# Patient Record
Sex: FEMALE | Race: WHITE | HISPANIC OR LATINO | Employment: UNEMPLOYED | URBAN - METROPOLITAN AREA
[De-identification: names, ages, dates, MRNs, and addresses within clinical notes are randomized per-mention and may not be internally consistent; named-entity substitution may affect disease eponyms.]

---

## 2020-09-22 ENCOUNTER — HOSPITAL ENCOUNTER (EMERGENCY)
Facility: HOSPITAL | Age: 25
Discharge: HOME | End: 2020-09-23
Attending: EMERGENCY MEDICINE | Admitting: EMERGENCY MEDICINE
Payer: COMMERCIAL

## 2020-09-22 VITALS
TEMPERATURE: 98 F | OXYGEN SATURATION: 97 % | RESPIRATION RATE: 18 BRPM | HEART RATE: 88 BPM | DIASTOLIC BLOOD PRESSURE: 61 MMHG | SYSTOLIC BLOOD PRESSURE: 104 MMHG

## 2020-09-22 DIAGNOSIS — K08.89 PAIN, DENTAL: Primary | ICD-10-CM

## 2020-09-22 DIAGNOSIS — K02.9 DENTAL CARIES: ICD-10-CM

## 2020-09-22 PROCEDURE — 99284 EMERGENCY DEPT VISIT MOD MDM: CPT | Performed by: EMERGENCY MEDICINE

## 2020-09-22 PROCEDURE — 99283 EMERGENCY DEPT VISIT LOW MDM: CPT

## 2020-09-22 PROCEDURE — 64400 NJX AA&/STRD TRIGEMINAL NRV: CPT | Performed by: EMERGENCY MEDICINE

## 2020-09-23 RX ORDER — BUPIVACAINE HYDROCHLORIDE 5 MG/ML
5 INJECTION, SOLUTION EPIDURAL; INTRACAUDAL ONCE
Status: COMPLETED | OUTPATIENT
Start: 2020-09-23 | End: 2020-09-23

## 2020-09-23 RX ORDER — LIDOCAINE HYDROCHLORIDE AND EPINEPHRINE 10; 10 MG/ML; UG/ML
5 INJECTION, SOLUTION INFILTRATION; PERINEURAL ONCE
Status: DISCONTINUED | OUTPATIENT
Start: 2020-09-23 | End: 2020-09-23 | Stop reason: HOSPADM

## 2020-09-23 RX ORDER — IBUPROFEN 600 MG/1
600 TABLET ORAL ONCE
Status: COMPLETED | OUTPATIENT
Start: 2020-09-23 | End: 2020-09-23

## 2020-09-23 RX ORDER — ACETAMINOPHEN 325 MG/1
650 TABLET ORAL ONCE
Status: COMPLETED | OUTPATIENT
Start: 2020-09-23 | End: 2020-09-23

## 2020-09-23 RX ADMIN — ACETAMINOPHEN 650 MG: 325 TABLET, FILM COATED ORAL at 00:29

## 2020-09-23 RX ADMIN — IBUPROFEN 600 MG: 600 TABLET, FILM COATED ORAL at 00:29

## 2020-09-23 RX ADMIN — BUPIVACAINE HYDROCHLORIDE 5 ML: 5 INJECTION, SOLUTION EPIDURAL; INTRACAUDAL at 00:33

## 2020-09-23 NOTE — ED PROVIDER NOTES
History  Chief Complaint   Patient presents with    Dental Pain     Pt reports onging dental pain in entire mouth; denies fevers, denies specific trigger       Dental Pain   Location:  Upper  Upper teeth location:  14/BAL 1st molar and 15/BAL 2nd molar  Quality:  Aching  Severity:  Moderate  Onset quality:  Gradual  Duration:  1 day  Timing:  Constant  Progression:  Waxing and waning  Chronicity:  New  Context: dental caries    Previous work-up:  Dental exam  Associated symptoms: no facial swelling, no fever and no neck pain        None       History reviewed  No pertinent past medical history  History reviewed  No pertinent surgical history  History reviewed  No pertinent family history  I have reviewed and agree with the history as documented  E-Cigarette/Vaping    E-Cigarette Use Never User      E-Cigarette/Vaping Substances     Social History     Tobacco Use    Smoking status: Never Smoker    Smokeless tobacco: Never Used   Substance Use Topics    Alcohol use: Not Currently    Drug use: Not Currently        Review of Systems   Constitutional: Negative for chills and fever  HENT: Positive for dental problem  Negative for facial swelling, sore throat and trouble swallowing  Eyes: Negative for photophobia and visual disturbance  Respiratory: Negative for cough, shortness of breath and stridor  Cardiovascular: Negative for chest pain and palpitations  Gastrointestinal: Negative for abdominal pain, blood in stool, diarrhea, nausea and vomiting  Genitourinary: Negative for dysuria and hematuria  Musculoskeletal: Negative for neck pain and neck stiffness  Skin: Negative for rash and wound  Neurological: Negative for weakness and numbness  Psychiatric/Behavioral: Negative for agitation and confusion  All other systems reviewed and are negative        Physical Exam  ED Triage Vitals   Temperature Pulse Respirations Blood Pressure SpO2   09/22/20 2355 09/22/20 2355 09/22/20 2355 09/22/20 2355 09/22/20 2355   98 °F (36 7 °C) 88 18 104/61 97 %      Temp Source Heart Rate Source Patient Position - Orthostatic VS BP Location FiO2 (%)   09/22/20 2355 09/22/20 2355 09/22/20 2355 09/22/20 2355 --   Oral Monitor Sitting Left arm       Pain Score       09/23/20 0029       7             Orthostatic Vital Signs  Vitals:    09/22/20 2355   BP: 104/61   Pulse: 88   Patient Position - Orthostatic VS: Sitting       Physical Exam  Vitals signs and nursing note reviewed  Constitutional:       General: She is not in acute distress  Appearance: She is well-developed  She is not diaphoretic  HENT:      Head: Normocephalic  Right Ear: External ear normal       Left Ear: External ear normal       Nose: Nose normal       Mouth/Throat:      Mouth: No injury, lacerations or oral lesions  Dentition: Dental caries present  No gingival swelling, dental abscesses or gum lesions  Tongue: No lesions  Palate: No mass  Pharynx: Oropharynx is clear  Uvula midline  No pharyngeal swelling, posterior oropharyngeal erythema or uvula swelling  Tonsils: No tonsillar exudate  Eyes:      Conjunctiva/sclera: Conjunctivae normal    Neck:      Trachea: No tracheal deviation  Cardiovascular:      Rate and Rhythm: Normal rate  Heart sounds: Normal heart sounds  Pulmonary:      Effort: Pulmonary effort is normal  No respiratory distress  Breath sounds: Normal breath sounds  No stridor  No wheezing or rales  Abdominal:      General: There is no distension  Palpations: Abdomen is soft  Tenderness: There is no abdominal tenderness  There is no guarding or rebound  Musculoskeletal:         General: No tenderness or deformity  Skin:     General: Skin is warm and dry  Capillary Refill: Capillary refill takes less than 2 seconds  Neurological:      Mental Status: She is alert  Cranial Nerves: No cranial nerve deficit  Sensory: No sensory deficit        Motor: No abnormal muscle tone  Psychiatric:         Behavior: Behavior normal          ED Medications  Medications   acetaminophen (TYLENOL) tablet 650 mg (650 mg Oral Given 9/23/20 0029)   ibuprofen (MOTRIN) tablet 600 mg (600 mg Oral Given 9/23/20 0029)   bupivacaine (PF) (MARCAINE) 0 5 % injection 5 mL (5 mL Infiltration Given 9/23/20 0033)       Diagnostic Studies  Results Reviewed     None                 No orders to display         Procedures  Nerve block    Date/Time: 9/23/2020 4:35 PM  Performed by: Jade Carvajal MD  Authorized by: Jade Carvajal MD     Consent:     Consent obtained:  Verbal    Consent given by:  Patient    Risks discussed: Allergic reaction, bleeding, intravenous injection, infection, nerve damage, pain, unsuccessful block and swelling    Alternatives discussed:  No treatment, delayed treatment and alternative treatment  Universal protocol:     Procedure explained and questions answered to patient or proxy's satisfaction: yes      Relevant documents present and verified: yes      Required blood products, implants, devices, and special equipment available: yes      Site marked: yes      Time out called: yes      Patient identity confirmed:  Verbally with patient, arm band, provided demographic data and hospital-assigned identification number  Indications:     Indications:  Pain relief  Location:     Body area:  Head    Head nerve blocked: Superior alveolar dental block  Laterality:  Left  Pre-procedure details:     Preparation: Patient was prepped and draped in usual sterile fashion    Procedure details (see MAR for exact dosages): Block needle gauge:  22 G    Anesthetic injected:  Bupivacaine 0 5% w/o epi  Post-procedure details:     Dressing:  None    Outcome:  Anesthesia achieved    Patient tolerance of procedure:   Tolerated well, no immediate complications          ED Course  ED Course as of Sep 23 1637   Wed Sep 23, 2020   0050 L upper dental block posterior molars SBIRT 20yo+      Most Recent Value   SBIRT (24 yo +)   In order to provide better care to our patients, we are screening all of our patients for alcohol and drug use  Would it be okay to ask you these screening questions? Yes Filed at: 09/23/2020 0002   Initial Alcohol Screen: US AUDIT-C    1  How often do you have a drink containing alcohol?  0 Filed at: 09/23/2020 0002   2  How many drinks containing alcohol do you have on a typical day you are drinking? 0 Filed at: 09/23/2020 0002   3a  Male UNDER 65: How often do you have five or more drinks on one occasion? 0 Filed at: 09/23/2020 0002   3b  FEMALE Any Age, or MALE 65+: How often do you have 4 or more drinks on one occassion? 0 Filed at: 09/23/2020 0002   Audit-C Score  0 Filed at: 09/23/2020 0002   ALF: How many times in the past year have you    Used an illegal drug or used a prescription medication for non-medical reasons? Never Filed at: 09/23/2020 0002                  MDM  Number of Diagnoses or Management Options  Dental caries:   Pain, dental:   Diagnosis management comments: This is a 66-year-old female who presents with dental pain x1 day, states that she is experiencing pain in her left upper molars, denies fevers or chills or systemic symptoms, no swelling of her face no difficulty swallowing  She was previously seen by the dentist and had dental extraction on the right upper side  On exam patient is noted to have multiple dental caries throughout her mouth, she does not have any appreciable gingival swelling, no evidence of abscess, she has a pain airway with no oral facial edema patient is handling secretions  Overall this appears to be dental pain secondary to dental caries, no evidence of soft tissue infection or systemic infection  Will treat symptomatically with dental block as well as Tylenol Motrin    Patient given information for follow-up with the [facility] walk-in dental clinic and counseled to follow up in the morning with them  Counseled patient on strict return precautions to the emergency department  Disposition  Final diagnoses:   Pain, dental   Dental caries     Time reflects when diagnosis was documented in both MDM as applicable and the Disposition within this note     Time User Action Codes Description Comment    9/23/2020 12:42 AM Clifton Escudero Add [K08 89] Pain, dental     9/23/2020 12:42 AM Clifton Escudero Add [K02 9] Dental caries       ED Disposition     ED Disposition Condition Date/Time Comment    Discharge Stable Wed Sep 23, 2020 12:42 AM Devan Carty discharge to home/self care  Follow-up Information     Follow up With Specialties Details Why Contact Info Additional Information    [facility]'s dental clinic  Call  Please call and follow-up in the morning at the walk-in dental clinic using the written information your provided      [facility] Emergency Department Emergency Medicine Go to  If he have swelling of the face, fevers or chills, difficulty swallowing or difficulty breathing [address] [facility] ED, [address], [address]   [phone]          There are no discharge medications for this patient  No discharge procedures on file  PDMP Review     None           ED Provider  Attending physically available and evaluated Devan Carty I managed the patient along with the ED Attending      Electronically Signed by         Niranjan Winston MD  09/23/20 6420

## 2020-09-23 NOTE — ED ATTENDING ATTESTATION
9/22/2020  ISerjio MD, saw and evaluated the patient  I have discussed the patient with the resident/non-physician practitioner and agree with the resident's/non-physician practitioner's findings, Plan of Care, and MDM as documented in the resident's/non-physician practitioner's note, except where noted  All available labs and Radiology studies were reviewed  I was present for key portions of any procedure(s) performed by the resident/non-physician practitioner and I was immediately available to provide assistance  At this point I agree with the current assessment done in the Emergency Department  I have conducted an independent evaluation of this patient a history and physical is as follows:    ED Course     Emergency Department Note- Fredi Peters 30 y o  female MRN: [id number]    Unit/Bed#: ED 07 Encounter: [id number]    Fredi Peters is a 30 y o  female who presents with   Chief Complaint   Patient presents with    Dental Pain     Pt reports onging dental pain in entire mouth; denies fevers, denies specific trigger         History of Present Illness   HPI:  Fredi Peters is a 30 y o  female who presents for evaluation of:  Amrita Richardson  The patient has been having ongoing dental pain over the last month  The patient has recently moved from [place] and has lost access to her dentist in [place]  The patient denies any dental trauma  Eating and cold fluids exacerbate her discomfort  Patient denies any associated fevers  Review of Systems   Constitutional: Negative for chills and fever  HENT: Positive for dental problem  Negative for congestion and rhinorrhea  Respiratory: Negative for cough and shortness of breath  All other systems reviewed and are negative  Historical Information   History reviewed  No pertinent past medical history  History reviewed  No pertinent surgical history    Social History   Social History     Substance and Sexual Activity   Alcohol Use Not Currently     Social History     Substance and Sexual Activity   Drug Use Not Currently     Social History     Tobacco Use   Smoking Status Never Smoker   Smokeless Tobacco Never Used     Family History: non-contributory    Meds/Allergies   all medications and allergies reviewed  No Known Allergies    Objective   First Vitals:   Blood Pressure: 104/61 (205)  Pulse: 88 (205)  Temperature: 98 °F (36 7 °C) (20)  Temp Source: Oral (20)  Respirations: 18 (20)  SpO2: 97 % (20)    Current Vitals:   Blood Pressure: 104/61 (205)  Pulse: 88 (20)  Temperature: 98 °F (36 7 °C) (20)  Temp Source: Oral (20)  Respirations: 18 (20)  SpO2: 97 % (20)    No intake or output data in the 24 hours ending 20 0058    Invasive Devices     None                 Physical Exam  Vitals signs and nursing note reviewed  Constitutional:       Appearance: Normal appearance  HENT:      Head: Normocephalic and atraumatic  Nose: No rhinorrhea  Cardiovascular:      Rate and Rhythm: Normal rate and regular rhythm  Pulmonary:      Effort: Pulmonary effort is normal       Breath sounds: Normal breath sounds  Abdominal:      General: Bowel sounds are normal       Palpations: Abdomen is soft  Musculoskeletal: Normal range of motion  Skin:     General: Skin is warm and dry  Neurological:      General: No focal deficit present  Mental Status: She is alert and oriented to person, place, and time  Psychiatric:         Mood and Affect: Mood normal          Behavior: Behavior normal          Thought Content: Thought content normal          Judgment: Judgment normal            Medical Decision Makin  Acute dentalgia secondary to cavities:  Dental block by resident  No results found for this or any previous visit (from the past 36 hour(s))    No orders to display         Portions of the record may have been created with voice recognition software  Occasional wrong word or "sound a like" substitutions may have occurred due to the inherent limitations of voice recognition software  Read the chart carefully and recognize, using context, where substitutions have occurred          Critical Care Time  Procedures

## 2020-09-25 ENCOUNTER — HOSPITAL ENCOUNTER (EMERGENCY)
Facility: HOSPITAL | Age: 25
Discharge: HOME | End: 2020-09-26
Attending: EMERGENCY MEDICINE | Admitting: EMERGENCY MEDICINE

## 2020-09-25 VITALS
SYSTOLIC BLOOD PRESSURE: 109 MMHG | BODY MASS INDEX: 24.16 KG/M2 | HEIGHT: 65 IN | HEART RATE: 98 BPM | DIASTOLIC BLOOD PRESSURE: 62 MMHG | WEIGHT: 145 LBS | OXYGEN SATURATION: 99 % | RESPIRATION RATE: 18 BRPM | TEMPERATURE: 99.3 F

## 2020-09-25 DIAGNOSIS — R06.00 DYSPNEA: ICD-10-CM

## 2020-09-25 DIAGNOSIS — B37.3 VULVOVAGINAL CANDIDIASIS: ICD-10-CM

## 2020-09-25 DIAGNOSIS — Z34.90 PREGNANCY: Primary | ICD-10-CM

## 2020-09-25 LAB
EXT PREG TEST URINE: NORMAL
EXT. CONTROL ED NAV: NORMAL

## 2020-09-25 PROCEDURE — 99282 EMERGENCY DEPT VISIT SF MDM: CPT

## 2020-09-25 PROCEDURE — 84702 CHORIONIC GONADOTROPIN TEST: CPT | Performed by: EMERGENCY MEDICINE

## 2020-09-25 PROCEDURE — 99284 EMERGENCY DEPT VISIT MOD MDM: CPT | Performed by: EMERGENCY MEDICINE

## 2020-09-25 PROCEDURE — 36415 COLL VENOUS BLD VENIPUNCTURE: CPT | Performed by: EMERGENCY MEDICINE

## 2020-09-25 PROCEDURE — 81025 URINE PREGNANCY TEST: CPT | Performed by: EMERGENCY MEDICINE

## 2020-09-26 LAB
B-HCG SERPL-ACNC: 24 MIU/ML
BACTERIA UR QL AUTO: ABNORMAL /HPF
BILIRUB UR QL STRIP: NEGATIVE
CLARITY UR: CLEAR
COLOR UR: YELLOW
COLOR, POC: YELLOW
GLUCOSE UR STRIP-MCNC: NEGATIVE MG/DL
HGB UR QL STRIP.AUTO: ABNORMAL
HYALINE CASTS #/AREA URNS LPF: ABNORMAL /LPF
KETONES UR STRIP-MCNC: NEGATIVE MG/DL
LEUKOCYTE ESTERASE UR QL STRIP: ABNORMAL
NITRITE UR QL STRIP: NEGATIVE
NON-SQ EPI CELLS URNS QL MICRO: ABNORMAL /HPF
PH UR STRIP.AUTO: 6 [PH] (ref 4.5–8)
PROT UR STRIP-MCNC: NEGATIVE MG/DL
RBC #/AREA URNS AUTO: ABNORMAL /HPF
SP GR UR STRIP.AUTO: >=1.03 (ref 1–1.03)
UROBILINOGEN UR QL STRIP.AUTO: 0.2 E.U./DL
WBC #/AREA URNS AUTO: ABNORMAL /HPF

## 2020-09-26 PROCEDURE — 81001 URINALYSIS AUTO W/SCOPE: CPT

## 2020-10-07 ENCOUNTER — HOSPITAL ENCOUNTER (EMERGENCY)
Facility: HOSPITAL | Age: 25
Discharge: HOME | End: 2020-10-07
Attending: EMERGENCY MEDICINE
Payer: COMMERCIAL

## 2020-10-07 ENCOUNTER — APPOINTMENT (EMERGENCY)
Dept: RADIOLOGY | Facility: HOSPITAL | Age: 25
End: 2020-10-07
Payer: COMMERCIAL

## 2020-10-07 VITALS
BODY MASS INDEX: 27.87 KG/M2 | TEMPERATURE: 98.4 F | HEIGHT: 62 IN | RESPIRATION RATE: 20 BRPM | HEART RATE: 65 BPM | SYSTOLIC BLOOD PRESSURE: 101 MMHG | DIASTOLIC BLOOD PRESSURE: 55 MMHG | WEIGHT: 151.46 LBS | OXYGEN SATURATION: 96 %

## 2020-10-07 DIAGNOSIS — O26.899 ABDOMINAL PAIN AFFECTING PREGNANCY: ICD-10-CM

## 2020-10-07 DIAGNOSIS — O20.0 THREATENED MISCARRIAGE IN EARLY PREGNANCY: Primary | ICD-10-CM

## 2020-10-07 DIAGNOSIS — N39.0 UTI (URINARY TRACT INFECTION): ICD-10-CM

## 2020-10-07 DIAGNOSIS — N93.9 VAGINAL BLEEDING: ICD-10-CM

## 2020-10-07 DIAGNOSIS — R10.9 ABDOMINAL PAIN AFFECTING PREGNANCY: ICD-10-CM

## 2020-10-07 LAB
ABO GROUP BLD: NORMAL
B-HCG SERPL-ACNC: 8549 MIU/ML
BACTERIA UR QL AUTO: ABNORMAL /HPF
BILIRUB UR QL STRIP: NEGATIVE
BLD GP AB SCN SERPL QL: NEGATIVE
CLARITY UR: CLEAR
COLOR UR: YELLOW
COLOR, POC: YELLOW
EXT PREG TEST URINE: POSITIVE
EXT. CONTROL ED NAV: ABNORMAL
GLUCOSE UR STRIP-MCNC: NEGATIVE MG/DL
HGB UR QL STRIP.AUTO: ABNORMAL
HYALINE CASTS #/AREA URNS LPF: ABNORMAL /LPF
KETONES UR STRIP-MCNC: NEGATIVE MG/DL
LEUKOCYTE ESTERASE UR QL STRIP: ABNORMAL
NITRITE UR QL STRIP: NEGATIVE
NON-SQ EPI CELLS URNS QL MICRO: ABNORMAL /HPF
PH UR STRIP.AUTO: 5 [PH] (ref 4.5–8)
PROT UR STRIP-MCNC: NEGATIVE MG/DL
RBC #/AREA URNS AUTO: ABNORMAL /HPF
RH BLD: POSITIVE
SP GR UR STRIP.AUTO: 1.02 (ref 1–1.03)
SPECIMEN EXPIRATION DATE: NORMAL
UROBILINOGEN UR QL STRIP.AUTO: 0.2 E.U./DL
WBC #/AREA URNS AUTO: ABNORMAL /HPF

## 2020-10-07 PROCEDURE — 87086 URINE CULTURE/COLONY COUNT: CPT

## 2020-10-07 PROCEDURE — 81001 URINALYSIS AUTO W/SCOPE: CPT

## 2020-10-07 PROCEDURE — 86850 RBC ANTIBODY SCREEN: CPT | Performed by: EMERGENCY MEDICINE

## 2020-10-07 PROCEDURE — 87077 CULTURE AEROBIC IDENTIFY: CPT

## 2020-10-07 PROCEDURE — 81025 URINE PREGNANCY TEST: CPT | Performed by: EMERGENCY MEDICINE

## 2020-10-07 PROCEDURE — 87186 SC STD MICRODIL/AGAR DIL: CPT

## 2020-10-07 PROCEDURE — 99284 EMERGENCY DEPT VISIT MOD MDM: CPT | Performed by: EMERGENCY MEDICINE

## 2020-10-07 PROCEDURE — 76815 OB US LIMITED FETUS(S): CPT

## 2020-10-07 PROCEDURE — 36415 COLL VENOUS BLD VENIPUNCTURE: CPT | Performed by: EMERGENCY MEDICINE

## 2020-10-07 PROCEDURE — 99284 EMERGENCY DEPT VISIT MOD MDM: CPT

## 2020-10-07 PROCEDURE — 84702 CHORIONIC GONADOTROPIN TEST: CPT | Performed by: EMERGENCY MEDICINE

## 2020-10-07 PROCEDURE — 86900 BLOOD TYPING SEROLOGIC ABO: CPT | Performed by: EMERGENCY MEDICINE

## 2020-10-07 PROCEDURE — 86901 BLOOD TYPING SEROLOGIC RH(D): CPT | Performed by: EMERGENCY MEDICINE

## 2020-10-08 RX ORDER — CEPHALEXIN 250 MG/1
500 CAPSULE ORAL EVERY 8 HOURS SCHEDULED
Qty: 30 CAPSULE | Refills: 0 | Status: SHIPPED | OUTPATIENT
Start: 2020-10-08 | End: 2020-10-13

## 2020-10-09 LAB — BACTERIA UR CULT: ABNORMAL

## 2020-11-02 ENCOUNTER — INITIAL PRENATAL (OUTPATIENT)
Dept: OBGYN CLINIC | Facility: CLINIC | Age: 25
End: 2020-11-02
Payer: COMMERCIAL

## 2020-11-02 VITALS
WEIGHT: 156 LBS | SYSTOLIC BLOOD PRESSURE: 104 MMHG | DIASTOLIC BLOOD PRESSURE: 64 MMHG | TEMPERATURE: 98.1 F | BODY MASS INDEX: 28.53 KG/M2

## 2020-11-02 DIAGNOSIS — Z3A.01 LESS THAN 8 WEEKS GESTATION OF PREGNANCY: ICD-10-CM

## 2020-11-02 DIAGNOSIS — O21.9 NAUSEA AND VOMITING IN PREGNANCY: ICD-10-CM

## 2020-11-02 DIAGNOSIS — Z36.89 ENCOUNTER FOR OTHER SPECIFIED ANTENATAL SCREENING: Primary | ICD-10-CM

## 2020-11-02 PROCEDURE — 99203 OFFICE O/P NEW LOW 30 MIN: CPT | Performed by: NURSE PRACTITIONER

## 2020-11-02 RX ORDER — ONDANSETRON 4 MG/1
4 TABLET, ORALLY DISINTEGRATING ORAL EVERY 6 HOURS PRN
Qty: 20 TABLET | Refills: 0 | Status: SHIPPED | OUTPATIENT
Start: 2020-11-02 | End: 2021-02-10

## 2020-11-11 ENCOUNTER — ROUTINE PRENATAL (OUTPATIENT)
Dept: OBGYN CLINIC | Facility: CLINIC | Age: 25
End: 2020-11-11
Payer: COMMERCIAL

## 2020-11-11 VITALS
WEIGHT: 155.2 LBS | DIASTOLIC BLOOD PRESSURE: 62 MMHG | TEMPERATURE: 98.2 F | SYSTOLIC BLOOD PRESSURE: 110 MMHG | BODY MASS INDEX: 28.39 KG/M2

## 2020-11-11 DIAGNOSIS — Z36.89 ENCOUNTER FOR OTHER SPECIFIED ANTENATAL SCREENING: Primary | ICD-10-CM

## 2020-11-11 PROCEDURE — 87106 FUNGI IDENTIFICATION YEAST: CPT | Performed by: OBSTETRICS & GYNECOLOGY

## 2020-11-11 PROCEDURE — 87591 N.GONORRHOEAE DNA AMP PROB: CPT | Performed by: OBSTETRICS & GYNECOLOGY

## 2020-11-11 PROCEDURE — G0145 SCR C/V CYTO,THINLAYER,RESCR: HCPCS | Performed by: OBSTETRICS & GYNECOLOGY

## 2020-11-11 PROCEDURE — 87491 CHLMYD TRACH DNA AMP PROBE: CPT | Performed by: OBSTETRICS & GYNECOLOGY

## 2020-11-11 PROCEDURE — 99213 OFFICE O/P EST LOW 20 MIN: CPT | Performed by: OBSTETRICS & GYNECOLOGY

## 2020-11-11 PROCEDURE — 87070 CULTURE OTHR SPECIMN AEROBIC: CPT | Performed by: OBSTETRICS & GYNECOLOGY

## 2020-11-13 LAB
C TRACH DNA SPEC QL NAA+PROBE: POSITIVE
N GONORRHOEA DNA SPEC QL NAA+PROBE: NEGATIVE

## 2020-11-14 LAB
BACTERIA GENITAL AEROBE CULT: ABNORMAL
BACTERIA GENITAL AEROBE CULT: ABNORMAL

## 2020-11-16 ENCOUNTER — TELEPHONE (OUTPATIENT)
Dept: OBGYN CLINIC | Facility: CLINIC | Age: 25
End: 2020-11-16

## 2020-11-16 DIAGNOSIS — A74.9 CHLAMYDIA: Primary | ICD-10-CM

## 2020-11-16 DIAGNOSIS — B37.3 VAGINAL CANDIDA: ICD-10-CM

## 2020-11-16 RX ORDER — AZITHROMYCIN 500 MG/1
1000 TABLET, FILM COATED ORAL ONCE
Qty: 2 TABLET | Refills: 0 | Status: SHIPPED | OUTPATIENT
Start: 2020-11-16 | End: 2020-11-16

## 2020-11-17 DIAGNOSIS — Z3A.01 LESS THAN 8 WEEKS GESTATION OF PREGNANCY: ICD-10-CM

## 2020-11-17 LAB
LAB AP GYN PRIMARY INTERPRETATION: NORMAL
LAB AP LMP: NORMAL
Lab: NORMAL

## 2020-11-21 ENCOUNTER — HOSPITAL ENCOUNTER (EMERGENCY)
Facility: HOSPITAL | Age: 25
Discharge: HOME | End: 2020-11-21
Attending: EMERGENCY MEDICINE | Admitting: EMERGENCY MEDICINE
Payer: COMMERCIAL

## 2020-11-21 VITALS
SYSTOLIC BLOOD PRESSURE: 93 MMHG | HEART RATE: 84 BPM | RESPIRATION RATE: 20 BRPM | OXYGEN SATURATION: 98 % | DIASTOLIC BLOOD PRESSURE: 60 MMHG | TEMPERATURE: 97.8 F

## 2020-11-21 DIAGNOSIS — R07.89 ATYPICAL CHEST PAIN: Primary | ICD-10-CM

## 2020-11-21 PROCEDURE — 99284 EMERGENCY DEPT VISIT MOD MDM: CPT

## 2020-11-21 PROCEDURE — 93005 ELECTROCARDIOGRAM TRACING: CPT

## 2020-11-21 PROCEDURE — 76815 OB US LIMITED FETUS(S): CPT | Performed by: EMERGENCY MEDICINE

## 2020-11-21 PROCEDURE — 99284 EMERGENCY DEPT VISIT MOD MDM: CPT | Performed by: EMERGENCY MEDICINE

## 2020-11-21 RX ORDER — ACETAMINOPHEN 325 MG/1
650 TABLET ORAL ONCE
Status: COMPLETED | OUTPATIENT
Start: 2020-11-21 | End: 2020-11-21

## 2020-11-21 RX ADMIN — ACETAMINOPHEN 650 MG: 325 TABLET ORAL at 16:54

## 2020-11-23 LAB
ATRIAL RATE: 85 BPM
P AXIS: 59 DEGREES
PR INTERVAL: 136 MS
QRS AXIS: 58 DEGREES
QRSD INTERVAL: 74 MS
QT INTERVAL: 340 MS
QTC INTERVAL: 404 MS
T WAVE AXIS: 23 DEGREES
VENTRICULAR RATE: 85 BPM

## 2020-11-23 PROCEDURE — 93010 ELECTROCARDIOGRAM REPORT: CPT | Performed by: INTERNAL MEDICINE

## 2020-12-02 ENCOUNTER — ROUTINE PRENATAL (OUTPATIENT)
Dept: PERINATAL CARE | Facility: CLINIC | Age: 25
End: 2020-12-02

## 2020-12-02 ENCOUNTER — TELEPHONE (OUTPATIENT)
Dept: OBGYN CLINIC | Facility: CLINIC | Age: 25
End: 2020-12-02

## 2020-12-02 VITALS
SYSTOLIC BLOOD PRESSURE: 100 MMHG | WEIGHT: 157.8 LBS | BODY MASS INDEX: 29.04 KG/M2 | HEIGHT: 62 IN | DIASTOLIC BLOOD PRESSURE: 62 MMHG | HEART RATE: 102 BPM

## 2020-12-02 DIAGNOSIS — O28.3 INCREASED NUCHAL TRANSLUCENCY SPACE ON FETAL ULTRASOUND: Primary | ICD-10-CM

## 2020-12-02 DIAGNOSIS — Z3A.13 13 WEEKS GESTATION OF PREGNANCY: ICD-10-CM

## 2020-12-02 DIAGNOSIS — O34.219 MATERNAL CARE FOR SCAR FROM PREVIOUS CESAREAN DELIVERY, UNSPECIFIED SCAR TYPE: ICD-10-CM

## 2020-12-02 DIAGNOSIS — Z36.82 NUCHAL TRANSLUCENCY OF FETUS ON PRENATAL ULTRASOUND: ICD-10-CM

## 2020-12-02 DIAGNOSIS — Z36.89 ENCOUNTER FOR OTHER SPECIFIED ANTENATAL SCREENING: ICD-10-CM

## 2020-12-02 PROCEDURE — 99242 OFF/OP CONSLTJ NEW/EST SF 20: CPT | Performed by: OBSTETRICS & GYNECOLOGY

## 2020-12-02 PROCEDURE — 76813 OB US NUCHAL MEAS 1 GEST: CPT | Performed by: OBSTETRICS & GYNECOLOGY

## 2020-12-02 PROCEDURE — 76801 OB US < 14 WKS SINGLE FETUS: CPT | Performed by: OBSTETRICS & GYNECOLOGY

## 2020-12-09 ENCOUNTER — ROUTINE PRENATAL (OUTPATIENT)
Dept: OBGYN CLINIC | Facility: CLINIC | Age: 25
End: 2020-12-09

## 2020-12-09 VITALS
DIASTOLIC BLOOD PRESSURE: 70 MMHG | WEIGHT: 158 LBS | HEIGHT: 62 IN | SYSTOLIC BLOOD PRESSURE: 110 MMHG | BODY MASS INDEX: 29.08 KG/M2

## 2020-12-09 DIAGNOSIS — Z20.2 CHLAMYDIA CONTACT, TREATED: Primary | ICD-10-CM

## 2020-12-09 DIAGNOSIS — Z34.82 PRENATAL CARE, SUBSEQUENT PREGNANCY, SECOND TRIMESTER: ICD-10-CM

## 2020-12-09 PROCEDURE — 87110 CHLAMYDIA CULTURE: CPT | Performed by: NURSE PRACTITIONER

## 2020-12-09 PROCEDURE — 99213 OFFICE O/P EST LOW 20 MIN: CPT | Performed by: NURSE PRACTITIONER

## 2020-12-14 LAB — C TRACH SPEC QL CULT: NEGATIVE

## 2020-12-15 ENCOUNTER — TELEPHONE (OUTPATIENT)
Dept: OBGYN CLINIC | Facility: CLINIC | Age: 25
End: 2020-12-15

## 2020-12-24 ENCOUNTER — TELEPHONE (OUTPATIENT)
Dept: OBGYN CLINIC | Facility: CLINIC | Age: 25
End: 2020-12-24

## 2020-12-24 ENCOUNTER — HOSPITAL ENCOUNTER (OUTPATIENT)
Facility: HOSPITAL | Age: 25
Discharge: HOME | End: 2020-12-24
Attending: OBSTETRICS & GYNECOLOGY | Admitting: OBSTETRICS & GYNECOLOGY

## 2020-12-24 VITALS
OXYGEN SATURATION: 99 % | BODY MASS INDEX: 29.08 KG/M2 | HEART RATE: 87 BPM | RESPIRATION RATE: 18 BRPM | WEIGHT: 158 LBS | SYSTOLIC BLOOD PRESSURE: 95 MMHG | DIASTOLIC BLOOD PRESSURE: 54 MMHG | TEMPERATURE: 98.8 F | HEIGHT: 62 IN

## 2020-12-24 LAB
ALBUMIN SERPL BCP-MCNC: 2.8 G/DL (ref 3.5–5)
ALP SERPL-CCNC: 60 U/L (ref 46–116)
ALT SERPL W P-5'-P-CCNC: 21 U/L (ref 12–78)
ANION GAP SERPL CALCULATED.3IONS-SCNC: 8 MMOL/L (ref 4–13)
AST SERPL W P-5'-P-CCNC: 17 U/L (ref 5–45)
ATRIAL RATE: 81 BPM
BILIRUB SERPL-MCNC: 0.33 MG/DL (ref 0.2–1)
BUN SERPL-MCNC: 5 MG/DL (ref 5–25)
CALCIUM ALBUM COR SERPL-MCNC: 9.6 MG/DL (ref 8.3–10.1)
CALCIUM SERPL-MCNC: 8.6 MG/DL (ref 8.3–10.1)
CHLORIDE SERPL-SCNC: 105 MMOL/L (ref 100–108)
CO2 SERPL-SCNC: 23 MMOL/L (ref 21–32)
CREAT SERPL-MCNC: 0.51 MG/DL (ref 0.6–1.3)
ERYTHROCYTE [DISTWIDTH] IN BLOOD BY AUTOMATED COUNT: 14.3 % (ref 11.6–15.1)
FLUAV RNA RESP QL NAA+PROBE: NEGATIVE
FLUBV RNA RESP QL NAA+PROBE: NEGATIVE
GFR SERPL CREATININE-BSD FRML MDRD: 132 ML/MIN/1.73SQ M
GLUCOSE P FAST SERPL-MCNC: 82 MG/DL (ref 65–99)
GLUCOSE SERPL-MCNC: 82 MG/DL (ref 65–140)
HCT VFR BLD AUTO: 31.3 % (ref 34.8–46.1)
HGB BLD-MCNC: 10.6 G/DL (ref 11.5–15.4)
MCH RBC QN AUTO: 28.6 PG (ref 26.8–34.3)
MCHC RBC AUTO-ENTMCNC: 33.9 G/DL (ref 31.4–37.4)
MCV RBC AUTO: 84 FL (ref 82–98)
P AXIS: 41 DEGREES
PLATELET # BLD AUTO: 155 THOUSANDS/UL (ref 149–390)
PMV BLD AUTO: 12.7 FL (ref 8.9–12.7)
POTASSIUM SERPL-SCNC: 4.2 MMOL/L (ref 3.5–5.3)
PR INTERVAL: 144 MS
PROT SERPL-MCNC: 6.5 G/DL (ref 6.4–8.2)
QRS AXIS: 50 DEGREES
QRSD INTERVAL: 72 MS
QT INTERVAL: 368 MS
QTC INTERVAL: 427 MS
RBC # BLD AUTO: 3.71 MILLION/UL (ref 3.81–5.12)
RSV RNA RESP QL NAA+PROBE: NEGATIVE
SARS-COV-2 RNA RESP QL NAA+PROBE: NEGATIVE
SODIUM SERPL-SCNC: 136 MMOL/L (ref 136–145)
T WAVE AXIS: 11 DEGREES
TROPONIN I SERPL-MCNC: <0.02 NG/ML
VENTRICULAR RATE: 81 BPM
WBC # BLD AUTO: 9.08 THOUSAND/UL (ref 4.31–10.16)

## 2020-12-24 PROCEDURE — 80053 COMPREHEN METABOLIC PANEL: CPT | Performed by: OBSTETRICS & GYNECOLOGY

## 2020-12-24 PROCEDURE — 85027 COMPLETE CBC AUTOMATED: CPT | Performed by: OBSTETRICS & GYNECOLOGY

## 2020-12-24 PROCEDURE — 84484 ASSAY OF TROPONIN QUANT: CPT | Performed by: OBSTETRICS & GYNECOLOGY

## 2020-12-24 PROCEDURE — 93010 ELECTROCARDIOGRAM REPORT: CPT | Performed by: INTERNAL MEDICINE

## 2020-12-24 PROCEDURE — 0241U HB NFCT DS VIR RESP RNA 4 TRGT: CPT | Performed by: OBSTETRICS & GYNECOLOGY

## 2020-12-24 PROCEDURE — 99214 OFFICE O/P EST MOD 30 MIN: CPT

## 2020-12-24 PROCEDURE — 93005 ELECTROCARDIOGRAM TRACING: CPT

## 2020-12-24 PROCEDURE — NC001 PR NO CHARGE: Performed by: OBSTETRICS & GYNECOLOGY

## 2021-01-08 ENCOUNTER — TELEPHONE (OUTPATIENT)
Dept: OBGYN CLINIC | Facility: CLINIC | Age: 26
End: 2021-01-08

## 2021-01-08 NOTE — TELEPHONE ENCOUNTER
Called pt & spoke with FOB re: missed appt 1/6/2021 - has not heard back re: insur coverage - not certain what insur she has applied for or when insur will be effective  Pt will transfer OB care to [facility] - address & ph# provided  He will call & schedule appt  Also reminded appt @ [facility] on 1/20/2021

## 2021-01-13 ENCOUNTER — ROUTINE PRENATAL (OUTPATIENT)
Dept: OBGYN CLINIC | Facility: CLINIC | Age: 26
End: 2021-01-13

## 2021-01-13 VITALS
WEIGHT: 165 LBS | BODY MASS INDEX: 30.36 KG/M2 | DIASTOLIC BLOOD PRESSURE: 63 MMHG | HEIGHT: 62 IN | SYSTOLIC BLOOD PRESSURE: 90 MMHG | HEART RATE: 83 BPM

## 2021-01-13 DIAGNOSIS — Z3A.19 19 WEEKS GESTATION OF PREGNANCY: Primary | ICD-10-CM

## 2021-01-13 PROCEDURE — 99213 OFFICE O/P EST LOW 20 MIN: CPT | Performed by: OBSTETRICS & GYNECOLOGY

## 2021-01-13 NOTE — PROGRESS NOTES
Kya Hartmann is a 30 y o  [id number] at 19w0d     Chief complaint today: none    ROS:   VB: denies   LOF: denies   CXN: denies   FM: present    Vitals:    21 1618   BP: 90/63   Pulse: 83        bpm    Transfer of care   - Loss of insurance   -  consult placed  2 prior C/S, both at 40w   - 1st in [place]; 9lb 3oz 12 (vertical skin incision, pt states that she does not think she can get reports but presumably transverse uterine incision, was told by the doctors in [facility] her pelvis would be too small)   - 2nd in [place], 8lb 3oz,3/16/15  pt was in labor but says sent home and scheduled for a c/s the next day, no ability to [facility]   - This is a new FOB   - Per 20 Dr Blair Manning reported an abnormal >95% NT 2 8mm   - Quad screen ordered as pt may not be able to acquire insurance for NIPT, tbd   - Level II US ordered  Patient given referral to [facility]  Delivery   - Reviewed 2 prior c/s recommendation is for repeat c/s due to increased risk of uterine rupture   - Will discuss tubal vs LARC at future appts  Hx of chlamydia   - BROOK 20 negative (originally + 20)  Large infant   - 1 hour glucose ordered  Difficulty sleeping   - Melatonin ordered  RTC in 4 weeks  Paula Ontiveros present as chaperone and     COVID 19 precautions were discussed with patient at length, reviewed symptoms, hygiene, social distancing, patient to call office  with questions/concerns        Future Appointments   Date Time Provider Madelyn Wiley   2021  1:45 PM  US 2 [address] [facility]   2021 10:45 AM [address] BE [facility]       Gary Sanford,   PGY-4 OB/GYN   2021 4:55 PM

## 2021-01-14 ENCOUNTER — LAB (OUTPATIENT)
Dept: LAB | Facility: HOSPITAL | Age: 26
End: 2021-01-14
Attending: EMERGENCY MEDICINE

## 2021-01-14 ENCOUNTER — PATIENT OUTREACH (OUTPATIENT)
Dept: OBGYN CLINIC | Facility: CLINIC | Age: 26
End: 2021-01-14

## 2021-01-14 DIAGNOSIS — Z3A.19 19 WEEKS GESTATION OF PREGNANCY: ICD-10-CM

## 2021-01-14 DIAGNOSIS — Z36.89 ENCOUNTER FOR OTHER SPECIFIED ANTENATAL SCREENING: ICD-10-CM

## 2021-01-14 DIAGNOSIS — O20.0 THREATENED MISCARRIAGE IN EARLY PREGNANCY: ICD-10-CM

## 2021-01-14 LAB
ABO GROUP BLD: NORMAL
BACTERIA UR QL AUTO: ABNORMAL /HPF
BASOPHILS # BLD AUTO: 0.02 THOUSANDS/ΜL (ref 0–0.1)
BASOPHILS NFR BLD AUTO: 0 % (ref 0–1)
BILIRUB UR QL STRIP: NEGATIVE
BLD GP AB SCN SERPL QL: NEGATIVE
CLARITY UR: CLEAR
COLOR UR: ABNORMAL
EOSINOPHIL # BLD AUTO: 0.11 THOUSAND/ΜL (ref 0–0.61)
EOSINOPHIL NFR BLD AUTO: 1 % (ref 0–6)
ERYTHROCYTE [DISTWIDTH] IN BLOOD BY AUTOMATED COUNT: 14.4 % (ref 11.6–15.1)
GLUCOSE UR STRIP-MCNC: NEGATIVE MG/DL
HBV SURFACE AG SER QL: NORMAL
HCT VFR BLD AUTO: 33.1 % (ref 34.8–46.1)
HGB BLD-MCNC: 11 G/DL (ref 11.5–15.4)
HGB UR QL STRIP.AUTO: ABNORMAL
HYALINE CASTS #/AREA URNS LPF: ABNORMAL /LPF
IMM GRANULOCYTES # BLD AUTO: 0.13 THOUSAND/UL (ref 0–0.2)
IMM GRANULOCYTES NFR BLD AUTO: 1 % (ref 0–2)
KETONES UR STRIP-MCNC: NEGATIVE MG/DL
LEUKOCYTE ESTERASE UR QL STRIP: ABNORMAL
LYMPHOCYTES # BLD AUTO: 1.06 THOUSANDS/ΜL (ref 0.6–4.47)
LYMPHOCYTES NFR BLD AUTO: 12 % (ref 14–44)
MCH RBC QN AUTO: 29.2 PG (ref 26.8–34.3)
MCHC RBC AUTO-ENTMCNC: 33.2 G/DL (ref 31.4–37.4)
MCV RBC AUTO: 88 FL (ref 82–98)
MONOCYTES # BLD AUTO: 0.49 THOUSAND/ΜL (ref 0.17–1.22)
MONOCYTES NFR BLD AUTO: 5 % (ref 4–12)
NEUTROPHILS # BLD AUTO: 7.27 THOUSANDS/ΜL (ref 1.85–7.62)
NEUTS SEG NFR BLD AUTO: 81 % (ref 43–75)
NITRITE UR QL STRIP: NEGATIVE
NON-SQ EPI CELLS URNS QL MICRO: ABNORMAL /HPF
NRBC BLD AUTO-RTO: 0 /100 WBCS
PH UR STRIP.AUTO: 7 [PH]
PLATELET # BLD AUTO: 183 THOUSANDS/UL (ref 149–390)
PMV BLD AUTO: 12.7 FL (ref 8.9–12.7)
PROT UR STRIP-MCNC: NEGATIVE MG/DL
RBC # BLD AUTO: 3.77 MILLION/UL (ref 3.81–5.12)
RBC #/AREA URNS AUTO: ABNORMAL /HPF
RH BLD: POSITIVE
RUBV IGG SERPL IA-ACNC: 22.2 IU/ML
SP GR UR STRIP.AUTO: 1.02 (ref 1–1.03)
UROBILINOGEN UR QL STRIP.AUTO: 0.2 E.U./DL
WBC # BLD AUTO: 9.08 THOUSAND/UL (ref 4.31–10.16)
WBC #/AREA URNS AUTO: ABNORMAL /HPF

## 2021-01-14 PROCEDURE — 86592 SYPHILIS TEST NON-TREP QUAL: CPT | Performed by: NURSE PRACTITIONER

## 2021-01-14 PROCEDURE — 87086 URINE CULTURE/COLONY COUNT: CPT | Performed by: NURSE PRACTITIONER

## 2021-01-14 PROCEDURE — 84702 CHORIONIC GONADOTROPIN TEST: CPT

## 2021-01-14 PROCEDURE — 86900 BLOOD TYPING SEROLOGIC ABO: CPT

## 2021-01-14 PROCEDURE — 81001 URINALYSIS AUTO W/SCOPE: CPT | Performed by: NURSE PRACTITIONER

## 2021-01-14 PROCEDURE — 86901 BLOOD TYPING SEROLOGIC RH(D): CPT

## 2021-01-14 PROCEDURE — 86762 RUBELLA ANTIBODY: CPT | Performed by: NURSE PRACTITIONER

## 2021-01-14 PROCEDURE — 36415 COLL VENOUS BLD VENIPUNCTURE: CPT | Performed by: NURSE PRACTITIONER

## 2021-01-14 PROCEDURE — 82677 ASSAY OF ESTRIOL: CPT

## 2021-01-14 PROCEDURE — 87389 HIV-1 AG W/HIV-1&-2 AB AG IA: CPT

## 2021-01-14 PROCEDURE — 85025 COMPLETE CBC W/AUTO DIFF WBC: CPT | Performed by: NURSE PRACTITIONER

## 2021-01-14 PROCEDURE — 82105 ALPHA-FETOPROTEIN SERUM: CPT

## 2021-01-14 PROCEDURE — 87340 HEPATITIS B SURFACE AG IA: CPT | Performed by: NURSE PRACTITIONER

## 2021-01-14 PROCEDURE — 86850 RBC ANTIBODY SCREEN: CPT

## 2021-01-14 PROCEDURE — 86336 INHIBIN A: CPT

## 2021-01-14 NOTE — PROGRESS NOTES
BILL met with 31 y/o- S- :P2:AB1-  Guyanese Speaking woman to assist with interpretation during prenatal visit  Pt reported pregnancy was intended  Pt transfer from another practice  Pt with no complaint  Has had 2 prior c/s and will get a 3rd one  Pt was advice to get lab work done as soon as possible  Pt was given melatonine to help with difficulty sleeping  Pt denies other concern  Will meet with SW next visit  to complete pn assessment

## 2021-01-15 LAB
B-HCG SERPL-ACNC: ABNORMAL MIU/ML
BACTERIA UR CULT: NORMAL
HIV 1+2 AB+HIV1 P24 AG SERPL QL IA: NORMAL
RPR SER QL: NORMAL

## 2021-01-18 LAB
2ND TRIMESTER 4 SCREEN SERPL-IMP: NORMAL
AFP ADJ MOM SERPL: 1.05
AFP SERPL-MCNC: 51.2 NG/ML
AGE AT DELIVERY: 27.5 YR
FET TS 18 RISK FROM MAT AGE: NORMAL
FET TS 21 RISK FROM MAT AGE: 892
GA METHOD: NORMAL
GA: 19.1 WEEKS
HCG ADJ MOM SERPL: 0.83
HCG SERPL-ACNC: NORMAL MIU/ML
IDDM PATIENT QL: NO
INHIBIN A ADJ MOM SERPL: 0.79
INHIBIN A SERPL-MCNC: 133.16 PG/ML
KARYOTYP BLD/T: NORMAL
MULTIPLE PREGNANCY: NO
NEURAL TUBE DEFECT RISK FETUS: NORMAL %
SERVICE CMNT-IMP: NORMAL
TS 18 RISK FETUS: NORMAL
TS 21 RISK FETUS: NORMAL
U ESTRIOL ADJ MOM SERPL: 1.11
U ESTRIOL SERPL-MCNC: 2.05 NG/ML

## 2021-01-19 ENCOUNTER — TELEPHONE (OUTPATIENT)
Dept: PERINATAL CARE | Facility: CLINIC | Age: 26
End: 2021-01-19

## 2021-01-19 NOTE — TELEPHONE ENCOUNTER
Spoke with patient and confirmed appointment with MFM  1 support person ( must be over age of 15) may accompany patient  Will you and your support person be able to wear a mask ,without a valve , during entire appointment? YES   To minimize your exposure in our waiting area,check in and rooming questions will be done via phone  When you arrive in the parking lot please call the following inside line # prior to entering office:    [facility]: [phone]    Have you or your support person traveled outside the state in the last 2 weeks? NO  If yes, what state did you travel to? Do you or your support person have:  Fever or flu- like symptoms? NO  Symptoms of upper respiratory infection like runny nose, sore throat or cough? NO  Do you have new headache that you have not had in the past?NO  Have you experienced any new shortness of breath recently? NO  Do you have any new loss of taste or smell? NO  Do you have any new diarrhea, nausea or vomiting? NO  Have you recently been in contact with anyone who has been sick or diagnosed with COVID-19 infection? NO  Have you been recommended to quarantine because of an exposure to a confirmed positive COVID19 person? NO  Have you recently been tested for COVID19? NO    Patient verbalized understanding of all instructions

## 2021-01-20 ENCOUNTER — ROUTINE PRENATAL (OUTPATIENT)
Dept: PERINATAL CARE | Facility: CLINIC | Age: 26
End: 2021-01-20
Payer: COMMERCIAL

## 2021-01-20 VITALS
BODY MASS INDEX: 31.21 KG/M2 | SYSTOLIC BLOOD PRESSURE: 110 MMHG | DIASTOLIC BLOOD PRESSURE: 54 MMHG | WEIGHT: 169.6 LBS | HEART RATE: 93 BPM | HEIGHT: 62 IN

## 2021-01-20 DIAGNOSIS — Z36.86 ENCOUNTER FOR ANTENATAL SCREENING FOR CERVICAL LENGTH: ICD-10-CM

## 2021-01-20 DIAGNOSIS — Z3A.19 19 WEEKS GESTATION OF PREGNANCY: ICD-10-CM

## 2021-01-20 DIAGNOSIS — Z36.3 ENCOUNTER FOR ANTENATAL SCREENING FOR MALFORMATIONS: ICD-10-CM

## 2021-01-20 DIAGNOSIS — O28.3 INCREASED NUCHAL TRANSLUCENCY SPACE ON FETAL ULTRASOUND: Primary | ICD-10-CM

## 2021-01-20 PROCEDURE — 76811 OB US DETAILED SNGL FETUS: CPT | Performed by: OBSTETRICS & GYNECOLOGY

## 2021-01-20 PROCEDURE — 99213 OFFICE O/P EST LOW 20 MIN: CPT | Performed by: OBSTETRICS & GYNECOLOGY

## 2021-01-20 PROCEDURE — 76817 TRANSVAGINAL US OBSTETRIC: CPT | Performed by: OBSTETRICS & GYNECOLOGY

## 2021-01-20 NOTE — PATIENT INSTRUCTIONS
Thank you for choosing us for your  care today  If you have any questions about your ultrasound or care, please do not hesitate to contact us or your primary obstetrician  Some general instructions for your pregnancy are:     Protect against coronavirus: Pregnant women are increased risk of severe COVID  Continue to practice social distancing, wear a mask, and wash your hands often  Because of the increased risk of pregnancy, you are advised to not attend or host inperson gatherings with people who do not currently live inside your household - this includes birthday parties, gender reveals, baby showers, etc)  Notify your primary care doctor if you have any symptoms including cough, shortness of breath or difficulty breathing, fever, chills, muscle pain, sore throat, or loss of taste or smell  Pregnant women can receive the coronavirus vaccine   Exercise: Aim for 22 minutes per day (150 minutes per week) of regular exercise  Walking is great!  Nutrition: aim for calcium-rich and iron-rich foods as well as healthy sources of protein   Protect against the flu: get yourself and your entire household vaccinated against influenza  This will protect your baby   Learn about Preeclampsia: preeclampsia is a common, serious high blood pressure complication in pregnancy  A blood pressure of 340ZRUV (systolic or top number) or 56FQOH (diastolic or bottom number) is not normal and needs evaluation by your doctor   If you smoke, try to reduce how many cigarettes you smoke or quit completely  Do not vape   Other warning signs to watch out for in pregnancy or postpartum: chest pain, obstructed breathing or shortness of breath, seizures, thoughts of hurting yourself or your baby, bleeding, a painful or swollen leg, fever, or headache (see AWHONN POST-BIRTH Warning Signs campaign)  If these happen call 911    Itching is also not normal in pregnancy and if you experience this, especially over your hands and feet, potentially worse at night, notify your doctors   Lastly, if you are contacted regarding participation in a survey about your experience in our office, please know that we take any feedback you provide seriously and use it to improve how we deliver care through our center

## 2021-01-20 NOTE — PROGRESS NOTES
[address]: Ms Jennifer Jean Baptiste was seen today at 20w0d for anatomic survey and cervical length screening ultrasound  See ultrasound report under "OB Procedures" tab  Please don't hesitate to contact our office with any concerns or questions    Jena Keenan MD

## 2021-01-20 NOTE — PROGRESS NOTES
Ultrasound Probe Disinfection    A transvaginal ultrasound was performed  Prior to use, disinfection was performed with High Level Disinfection Process (Trophon)  Probe serial number B2: [id number] was used        Alexa Roy  01/20/21  1:44 PM

## 2021-01-26 ENCOUNTER — TELEPHONE (OUTPATIENT)
Dept: OBGYN CLINIC | Facility: CLINIC | Age: 26
End: 2021-01-26

## 2021-01-27 ENCOUNTER — INITIAL PRENATAL (OUTPATIENT)
Dept: OBGYN CLINIC | Facility: CLINIC | Age: 26
End: 2021-01-27

## 2021-01-27 VITALS
HEART RATE: 96 BPM | DIASTOLIC BLOOD PRESSURE: 63 MMHG | BODY MASS INDEX: 30.91 KG/M2 | WEIGHT: 168 LBS | HEIGHT: 62 IN | SYSTOLIC BLOOD PRESSURE: 95 MMHG

## 2021-01-27 DIAGNOSIS — Z34.92 PREGNANT AND NOT YET DELIVERED IN SECOND TRIMESTER: ICD-10-CM

## 2021-01-27 DIAGNOSIS — Z98.891 HISTORY OF C-SECTION: ICD-10-CM

## 2021-01-27 DIAGNOSIS — Z78.9 LANGUAGE BARRIER: ICD-10-CM

## 2021-01-27 DIAGNOSIS — Z3A.21 21 WEEKS GESTATION OF PREGNANCY: Primary | ICD-10-CM

## 2021-01-27 PROCEDURE — 99211 OFF/OP EST MAY X REQ PHY/QHP: CPT

## 2021-01-27 NOTE — PROGRESS NOTES
OB INTAKE INTERVIEW  Pt presents for OB intake  [id number]  OB History    Para Term  AB Living   4 2 2   1 2   SAB TAB Ectopic Multiple Live Births   1       2      # Outcome Date GA Lbr Vick/2nd Weight Sex Delivery Anes PTL Lv   4 Current            3 Term 06/03/15 40w0d  3402 g (7 lb 8 oz) M CS-LTranv   RAHEL   2 Term 12 40w0d  3856 g (8 lb 8 oz) M CS-Classical   RAHEL   1 SAB               Obstetric Comments   Menstrual cycle: 14     Hx of  delivery prior to 36 weeks 6 days:  No  Last Menstrual Period:  2020  No LMP recorded (lmp unknown)  Patient is pregnant  Estimated Date of Delivery: 21   by US  PN  visit scheduled  February 10, 2021 @ 9:30 am  with Beryle Crigler, CRNP  Last pap smear: 2020  Findings; lab pap smear results: no abnormalities  Current Issues:  Constipation :   No  Headaches :   Yes  Cramping:  No  Spotting :   No  PICA cravings :  No  FOB Involved:   Yes  Planned pregnancy:  Yes  I have these concerns about this prenatal patient:   1  21 weeks gestation of pregnancy  21 0 weeks as of ,21  Reprinted lab slip for 1 hr Glucola  2  Pregnant and not yet delivered in second trimester  Pt completed MFM appointments  QUAD screen negative  Fetal growth scheduled for 2021 per Dr Morin Bullion recommendations on 2021     3  Language barrier  [place]     4  History of    x2  Repeat , desires tubal ligation  Pt is self pay but will sign MA 31 at 28 weeks  Interview education   [facility] Pregnancy Essentials reviewed and discussed    Baby [facility] Me Support [facility] Handout   [facility]   Discussed genetic testing   Prenatal lab work: Scripts printed and given to pt   Influenza vaccine given today: No   Discussed Tdap vaccine  Immunizations: There is no immunization history on file for this patient    Depression Screening Follow-up Plan: Patient's depression screening was negative with an [place] score of  0  Clinically patient does not have depression  No treatment is required     Nurse/Family Partnership- referral placed:  N/A  BMI Counseling: Body mass index is 30 73 kg/m²  Tobacco Cessation Counseling: non-smoker  Infection Screening: Does the pt have a hx of MRSA? No  The patient was oriented to our practice and all questions were answered    Interviewed by: Germania Reinoso RN 01/27/21

## 2021-01-27 NOTE — LETTER
[facility] Letter    Taiwo Ugalde  1995  [address] [address]       01/27/21          Taiwo Ugalde is a patient and under our care in our office  John Kearns Estimated Date of Delivery: 6/9/21  Any questions or concerns feel free to contact our office       Thank you,    [address] [facility]  [id number] [facility]/Isabel Ji [address]  [facility] [place] [place]  [address]  [phone]

## 2021-01-27 NOTE — LETTER
Dentist Letter    Shahana Boyle  1995  [address]          01/27/21    We have had several requests from local dentist requesting permission to perform procedures on our patients who are pregnant  We wish to respond with this letter regarding some of the more routine procedures that we have been asked about  The following procedures may be performed on our obstetric patients:   1  Administration of local anesthesia   2  Administration of antibiotics such as PCN, Ampicillin, and Erythromycin  3  Administration of pain medications such as Tylenol, Tylenol with codeine, and if needed Percocet  4  Shielded X-rays    Should you have any questions, please do not hesitate to contact at [phone]          Sincerely,    [facility]  [phone]

## 2021-01-27 NOTE — LETTER
Proof of Pregnancy Letter    Socorro Gabriel  1995  [id number] [facility] [id number]        01/27/21      Socorro Gabriel is a patient at our facility  Shaniachristine Harvey Estimated Date of Delivery: 6/9/21       Any questions or concerns, please feel free to contact our office      Sincerely,     [facility]   [address]   [address]   [phone]

## 2021-01-27 NOTE — PATIENT INSTRUCTIONS
Coronavirus (COVID-19) pregnancy FAQs: Medical experts answer your questions  From [employer] com cy  [id number]  bc (courtesy of [facility])  As of 3/18/20  By Natividad Kayser, [address]  Medically reviewed by Society for Maternal-Fetal Medicine       We asked parents-to-be to send us their most pressing questions about coronavirus (COVID-19)  Among them: Is it still safe to deliver in a hospital? What if my ob-gyn has the virus? We sent those questions to the top docs at the Society for Maternal-Fetal Medicine  Here are their answers  The coronavirus (COVID-19) pandemic has been declared a national emergency in the [facility] by [employer]  Moms-to-be like you are concerned about everything from getting medicines to managing disruptions at work  But above and beyond any worry about lifestyle changes is a focus on your health and the impact of COVID-19 on your pregnancy  We asked obstetrics doctors who handle the most complicated pregnancy issues to answer your questions about the coronavirus  Here are their responses, provided by Dr Jasmyne Griffith and her colleagues at the Society for [place] [facility]  Am I at more risk for COVID-19 if I'm pregnant? We don't know  Pregnancy does change your immune system in ways that might make you more susceptible to viral respiratory infections like COVID-19  If you become infected, you might also be at higher risk for more severe illness compared to the general population  Although this does not appear to be the case with COVID-19, based on limited data so far, a higher risk has been true for pregnant women with other coronavirus infections (SARS-CoV and MERS-CoV) and other viral respiratory infections, such as flu  It's important to take preventive actions to avoid infection, such as washing your hands often and avoiding people who are sick      How might coronavirus affect my pregnancy? Again, we don't know  Women with other coronavirus infections (such as SARS-CoV) did not have miscarriage or stillbirth at higher rates than the general population  We know that having other respiratory viral infections during pregnancy, such as flu, has been associated with problems like low birth weight and  birth  Also, having a high fever early in pregnancy may increase the risk of certain birth defects  Could I transmit coronavirus to my baby during pregnancy or delivery? We don't know whether you could transmit COVID-19 to your baby before or during delivery  However, among the few case studies of infants born to mothers with COVID-23 published in peer-reviewed literature, none of the infants tested positive for the virus  Also, there was no virus detected in samples of amniotic fluid or breast milk  There have been a few reports of newborns as young as a few days old with infection, suggesting that a mother can transmit the infection to her infant through close contact after delivery  There have been no reports of mother-to-baby transmission for other coronaviruses (MERS-CoV and SARS-CoV), although only limited information is available  Is it safe for me to deliver at a hospital where there have been COVID-19 cases? Yes  We know that COVID-19 is a very scary virus  The good news is that hospitals are taking great precautions to keep patients and healthcare providers safe  When a patient is even suspected to have COVID-19, they are placed in a negative pressure room  (Think of these rooms as vacuums that suck and filter the air so it's safe for the other people in the hospital)  This makes it possible for you to deliver at the hospital without putting you or your baby at risk  Hospitals are also implementing stricter visiting policies to keep patients safe  It's worth calling your hospital to check if there are any new regulations to be aware of      What plans should I make now in case the hospital system is overwhelmed when it's time for me to deliver? This is a great question to talk with your doctor or midwife about when you're 34 to 36 weeks pregnant  Every hospital is making different plans for dealing with this scenario  I work in healthcare  Should I ask my doctor to excuse me from work until the baby is born? What if I work in a school, the travel [employer] 20, or some other high-risk setting? Healthcare facilities should take care to limit the exposure of pregnant employees to patients with confirmed or suspected COVID-19, just as they would with other infectious cases  If you continue working, be sure to follow the CDC's risk assessment and infection control guidelines  If you work in a school, travel [employer] 20, or other high-risk setting, talk with your employer about what it's doing to protect employees and minimize infection risks  Wash your hands often  What if my OB gets COVID-19? If your doctor or midwife tests positive for COVID-19, they will need to be quarantined until they recover and are no longer at risk of transmitting the virus  In this case, you'll be assigned to another OB in your doctor's practice (or you may choose another practitioner yourself)  Ask your new OB or your doctor's office if you should self-quarantine or be tested for the virus  (It will depend on when you last saw your provider and when that person tested positive )    Should we hold off on trying to conceive because of COVID-19? At this time, there's no reason to hold off on trying to get pregnant, but the data we have is really limited  For example, we don't think the virus causes birth defects or increases your risk of miscarriage  But we don't know whether you could transmit COVID-19 to your baby before or during delivery  We also don't know if the virus lives in semen or can be sexually transmitted      We have a babymoon scheduled in the next few months - should we cancel? If you're planning to travel internationally or on a cruise, you should strongly consider canceling  At this time, the virus has reached more than 140 countries, and there are travel bans to [place], most of [place], and [place]  Places where large numbers of people gather are at highest risk, especially airports and cruise ships  If you're planning travel in the [place] , note that any travel setting increases your risk of exposure, and there may be travel bans even in [place] by the time you're scheduled to go  Even if you're state is not blocked, you'll definitely want to avoid traveling to communities where the virus is spreading  To find out where these places are, check The [place] Times map based on CDC data  For the most current advice to help you avoid exposure, check the CDC's COVID-19 travel page  Will the hospital separate me from my  and keep the baby in quarantine? If you test positive for COVID-19 or have been exposed but have no symptoms, the CDC, [employer] of Obstetricians and Gynecologists, and the Society for [place] 250 all recommend that you be  from your baby to decrease the risk of transmission to the baby  This would last until you are no longer at risk of transmitting the virus  If you do not have COVID-19 and have not been exposed to the virus, the hospital will not separate you from your   My hospital is restricting visitors and only allowing one support person  If my support person leaves after the delivery, will they be allowed to come back? Every hospital has different policies  Contact your hospital or labor and delivery unit a week or so before delivery to get the most up-to-date restrictions  In general, if your support person needs to leave, they would be allowed back unless they knew they were exposed to COVID-19 after leaving your company    [facility] understands that the coronavirus (COVID-19) pandemic is an evolving story and that your questions will change over time  We'll continue asking moms and dads in our Community what they want to know, and we'll get the answers from experts to keep them - and you - informed and supported  My mom was planning to fly here to help me care for my new baby after delivery  Should I tell her not to come? Yes  If your mom is over 61 or has any serious chronic medical conditions (such as heart disease, lung disease, or diabetes), she is at higher risk of serious illness from COVID-19 and should avoid air travel  And remember that any travel setting increases a person's risk of exposure  So, it may be risky to have her around the baby after she has been traveling  For the most current advice on traveling, check the CDC's COVID-19 travel page  [facility] understands that the coronavirus pandemic is an evolving story and that your questions will change over time  We'll continue asking moms and dads in our Community what they want to know, and we'll get the answers from experts to keep them - and you - informed and supported  [facility] desea decirle ¡Felicitaciones por Jamas Fortune! Nos complace que nos haya elegido para ser suazo proveedor de coretta servicios de ellis médica samantha suazo Joselin Greenhouse  Suazo ellis, la ellis de suazo hijo por nacer (niños) y suazo nancy son importante para nosotros  Ahora, más que [place], suazo ellis será lo más importante para usted  El crecimiento y el progreso de usazo bebé pueden verse afectados por la forma en que usted se cuide  Es [facility] buena idea planificar con anticipación  Es un hecho conocido que las mujeres que reciben atención mèdica desde temprano en  [facility] y samantha todo el embarazo tienen bebés más saludables  Se programarán visitas para usted samantha todo [facility]  Es suazo deber cumplir con coretta citas y seguir las instrucciones para suazo cuidado  El [place] de visitas será decidido por suazo médico  No tengas miedo de hacer preguntas   Hable con coretta enfermeras y proveedores sobre coretta planes de parto y cualquier inquietud que pueda tener  Lista de Verificación  ; Llame a Medicina Materno Fetal (MFM) al [phone] para programar suazo nat   Cribado secuencial (opcional)   Hecho entre 12 y 15 semanas de gestación  - ultrasonido  - Riesgos para la trisomía 25 y 24   - La jennifer bífida (segunda parte), después de 16 semanas, será explicada por la oficina de MFM   Pantalla QUAD, si corresponde    ; Exploración de anatomía  o ultrasonido de 20 semanas  o El género sexo, puede ser revelado, si lo desea  o Nat de 1 hora, solo se permite 1 persona de apoyo, NO niños    ; Análisis de maricarmen: complete antes de suazo nat de H&P   NO tiene que ayunar para ningún análisis de maricarmen a menos que se lo indiquen  - CBC, Tipo de [place] y 200 Exempla Alvin de anticuerpos, Análisis de [facility] de glucosa al oneyda, VIH, sífilis, hepatitis B   SI corresponde: 1 hora de Glucola o Hemoglobina A1C   orina de 24 horas, CMP, proporción de creatinina proteica    ; Pravin Brunette y física: nat de H&P   examen físico   Examen pélvico: Naty Galas y Clamidia   Si es necesario: Papanicolaou    Plan:  ; Visitas prenatales de rutina cada 4 semanas hasta las 28 semanas   En coretta visitas prenatales:  - Monitoreo de los el latidos del corazón del bebé y medir suazo radha en crecimiento, Recolecte virginia [facility], y se tomara suazo peso y presión arterial  ; 28 semanas: las visitas prenatales serán cada 2 semanas   vacuna TDaP   Conteo sanguíneo completo   RPR   tipo de [place] y anticuerpos   Se puede administrar [place] en gabriela momento, si es necesario   Diabetes gestacional, prueba de Glucola de 1 hora (sin ayuno)   Si ang la Glucola de 1 hora, suazo proveedor solicitará virginia Glucola de 3 horas  La prueba de Glucola de 3 horas es en ayunas     Si no pasa la prueba de Glucola de 3 horas, será derivada al equipo de educación para diabéticos de Medicina Materno Fetal  Joanna Mike aún más llamando al [phone]   ; Comience a realizar conteos diarios de las patadas fetales  ; Clases prenatales   Clases preparadas de educación sobre el parto, [id number], cuidado del recién LUPILLO Campbell para bebés / Jennifer Villarreal asientos para automóviles y otros   Llame a [place] CLASSES [phone] para registrarse   Si el formulario de registro no GCW, lizette clase, muy probablemente, está llena y deberá elegir courtney fecha / hora diferente   Hemos tenido [employer] sorprendente a nuestras clases, así que regístrese temprano para garantizar suazo clase deseada   Asegúrese de consultar con suazo compañía de seguros, ya que algunas compañías de seguros reembolsarán parte o la totalidad de las tarifas asociadas a la clase  ; 36 semanas: las visitas prenatales comienzan a ser semanales   Se recolectará el regulo Beta Strep (GBS)   [place] se hace con un hisopo vaginal en la oficina   También se obtendrán pruebas de clamidia / Dwaine Heritage, si corresponde   ; Prepárese para el parto   Prepare suazo bolso y pertenencias para el hospital   Familiarícese con las pautas de visita   ; RELAJECE   Disfrute las últimas semanas de suazo Amy Gonzalesales de [place]erta en el embarazo: Maria Esther Card  [phone]    1  Sangrado vaginal  2  Dolor abdominal bria que no desaparece  3  Fiebre (más de 100 4 y no se stephane con Tylenol)  4  Vómitos persistentes que iraheta más de 24 horas  5  dolor de pecho  6  Dolor o ardor al orinar  7  Dolor de tamanna severo que no se resuelve con Tylenol  8  Visión borrosa o danay puntos en suazo visión  9  Hinchazón repentina de suazo charleen o everardo  10  Enrojecimiento, hinchazón o dolor en courtney pierna  11  Un aumento de peso repentino en pocos días  12  Contar los movimientos fetales del bebé  (después de 28 semanas o el sexto mes de embarazo)  15  Courtney pérdida de líquido acuoso de la vagina: puede ser un chorro, un goteo o courtney humedad continua  14   Después de 20 semanas de embarazo, calambres rítmicos (más de 4 por hora) o menstruales ailyn dolor Ronveronica Sumner / Catie Barone y Embarazo:  La siguiente lista de medicamentos de Elenora Olivares generalmente se considera goodwin de sudheer samantha el Maliha Gains  Tenga cuidado de no duplicar los productos que contienen acetaminofén (Tylenol)  Resfriados / dolor de garganta   Robitussin DM - Simple (guaifenesina)   Aerosol nasal salino   Gárgaras de agua salada caliente  Cepacol pastillas para la garganta o enjuague bucal (cetilpiridinio)   Sucrets (hexylresoricinol)    Rachana [employer] LA D - o DESCONGESANTE   Claritin (loratadine)   Zrytec (cetirizina)   Allerga (fexofenadina)    Yana de Chelle / Angel Saldivar y molestias:   Tylenol (paracetamol) (acetaminophen)    NO exceda más de 3000 mg de Tylenol en un período de 24 horas  Acidez   Mylanta (hidróxido de aluminio / simeticona, hidróxido de magnesio)   Maalaox (hidróxido de aluminio y Brady, hidróxido de magnesio)   Tums (carbonato de calcio)   Riopan (magaldrate)    Estreñimiento   Colace (docusato de sodio)   Surfak (docusato de sodio)   MiraLAX   Supositorios de glicerina   Flota enema (fosfato de sodio y bifosfato de sodio)      Náuseas vómitos   Vitamina B6 (piridoxina): puede sudheer 50 mg a la hora de WEDGECARRUP, 25 mg por la mañana, 25 mg por la tarde   Unisom (doxilamina): se puede usar para las náuseas / vómitos (meg virginia tableta de 25 mg por la mitad)  Puede causar somnolencia  Dormir   Benadryl (difenhidramina): tome 1-2 tabletas según sea necesario antes de acostarse   Tableta Unisom (doxilamina) de 25 mg    Tableta de melatonina de 5 mg: según sea necesario antes de acostarse      En general, la forma genérica de la medicina suele ser más económica que la forma de octavia del [employer]  CUIDADO AMBULATORIO:   Lo qué necesita saber sobre el Maliha Gains: Un embarazo normal dura alrededor de 40 semanas   El primer trimestre dura desde suazo último periodo hasta la semana 12 de [place]  El mary trimestre se extiende desde la semana 13 de suazo embarazo hasta la semana 23  El tercer trimestre se extiende desde la semana 24 de embarazo hasta que nazca suazo bebé  Si usted conoce la fecha de suazo último periodo, suazo médico puede calcular la fecha de nacimiento de suazo bebé  Es posible que usted de a roger a suazo bebé en cualquier momento desde la semana 37 hasta 2 semanas después de la fecha calculada de [place]  Comuníquese con suazo médico si:   · Usted tiene calambres, presión o tensión abdominal   · Usted tiene un cambio en la secreción vaginal   · Usted no puede retener alimentos ni líquidos y está perdiendo [place]  · Usted tiene escalofríos o fiebre  · Usted tiene comezón, ardor o dolor vaginal    · Usted tiene virginia secreción vaginal amarillenta, verdosa, serge o de [employer]  · Usted tiene dolor o ardor al Linzie Heman, orina menos de lo habitual o tiene [place] rosada o sanguinolenta  · Usted tiene preguntas o inquietudes acerca de suazo condición o cuidado  Cambios corporales que pueden ocurrir samantha suazo embarazo:   · Los cambios en los senos  que usted [place] sensibilidad y cosquilleo samantha la primera parte de suazo [place]  Los senos se volverán más grandes  Es posible que necesite un sostén con soporte  Es posible que usted pasha [facility] secreción delgada y MIKE, conocida ailyn calostro, que sale de coretta pezones samantha el mary trimestre  El calostro es un líquido que se convertirá en [place] alrededor de 3 días después de usted xuan dado a roger  · Cambios en la piel y estrías  podrían ocurrir samantha suazo embarazo  Es posible que usted tenga marcas anderson, conocidas ailyn estrías, en suazo piel  Las [employer] se desvanecen después del [facility]  Utilice crema si suazo piel está seca y con comezón  La piel de suazo charleen, alrededor de los pezones y debajo de suazo ombligo podría oscurecerse   La mayoría del [place], suazo piel [place] a suazo color normal después del nacimiento de suazo bebé  · El malestar matutino  consiste en náuseas y vómitos que pueden ocurrir en cualquier momento del día  Evite los alimentos grasosos y picantes  Coma comidas pequeñas samantha el día en vez de porciones grandes  El jengibre puede ayudar a [employer]  Consulte con suazo médico acerca de otras formas para disminuir las náuseas y el vómito  · Acidez estomacal  puede ser causada por los cambios hormonales samantha suazo [place]  El Alvie Spurr en crecimiento puede empujar suazo estómago hacia arriba y forzar ácido estomacal a acumularse dentro de suazo esófago  Mertens 4 o 5 comidas pequeñas cada día en vez de comidas grandes  Evite los alimentos picantes  Evite comer abiodun antes de irse a la cama  · Estreñimiento  puede desarrollarse samantha suazo embarazo  Para tratar el estreñimiento, coma alimentos altos en fibra ailyn cereales con fibra, frijoles, frutas, verduras, panes integrales y [place]  [place] de [place] regular y tome suficiente agua  Es posible que suazo médico sugiera un suplemento con fibra para ablandar coretta evacuaciones intestinales  Consulte con suazo médico antes de usar cualquier medicamento para disminuir el estreñimiento  · Las hemorroides  son Kristi Grain grandes en el área rectal  Pueden causar dolor, comezón y sangrado de color teresa vivo en suazo recto  Para disminuir el riesgo de hemorroides, prevenga el estreñimiento y no se esfuerce cuando tenga virginia evacuación intestinal  Si usted tiene hemorroides, sumérjase en virginia bañera con agua tibia para aliviar la incomodidad  Consulte con suazo médico cómo puede tratar las hemorroides  · Los calambres y la hinchazón en las piernas  pueden ser causados por niveles bajos de calcio o por el peso adicional del Amy  Eleve coretta piernas por encima del nivel de suazo corazón para disminuir la hinchazón  Samantha un calambre en la pierna, estire o de un masaje al [employer] que tiene el calambre   El calor puede ayudar a disminuir el dolor y Greg Keene musculares  Aplique calor sobre el músculo por 20 a 30 minutos cada 2 horas por la cantidad de días que se le indique  · Dolor en la espalda  puede ocurrir a medida que suazo bebé crece  No esté de pie por largos periodos de tiempo ni levante objetos pesados  Use virginia buena postura mientras esté de pie, se agache o se doble  Use zapatos de tacón bajo con un buen soporte  Descansar puede también ayudarla a aliviar el dolor de espalda  Pregunte a suazo médico acerca de ejercicios que usted pueda hacer para fortalecer los músculos de suazo espalda  Manténgase saludable samantha suazo embarazo:   · Consuma alimentos saludables y variados  Alimentos saludables incluyen frutas, verduras, panes de koffi integral, alimentos lácteos bajos en grasa, frijoles, raimundo magras y pescado  [place] líquidos ailyn se le haya indicado  Pregunte cuánto líquido debe sudheer cada día y cuáles líquidos son los más adecuados para usted  o Cafeína: no está rigoberto cómo la cafeína afecta el embarazo  Limite suazo consumo de cafeína para evitar posibles problemas de ellis   - Limite la cafeína a menos de 200 miligramos por día  - La cafeína se puede encontrar en el café, el té, la cola, las bebidas deportivas y el chocolate  o  Alimentos que contienen zahra: el zahra se encuentra naturalmente en nancy todos los tipos de pescados y mariscos  Algunos tipos de peces absorben niveles más altos de zahra que pueden ser dañinos para un bebé mona  Coma solo pescado y mariscos bajos en zahra  - Limite suazo consumo de pescado a 2 porciones por semana  - Elija pescado con bajo contenido de zahra, ailyn atún rigoberto enlatado, camarones, cangrejo, salmón, bacalao o tilapia   o No coma pescado con alto contenido de [employer] pez sarah, el pez azulejo, la caballa real y el tiburón  - Cada semana, puede comer hasta 12 onzas de pescado o mariscos que tienen bajos niveles de [employer]   Estos incluyen camarones, atún rigoberto enlatado, salmón, abadejo y bagre   o Coma solo 6 onzas de atún tavera (tavera) por semana  El atún tavera tiene más zahra que el atún [place]  · [id number] [address] [place]  Suazo necesidad de ciertas vitaminas y [facility], ailyn el ácido fólico, aumenta samantha el [facility]  Las vitaminas prenatales proporcionan algunas de las vitaminas y minerales adicionales que usted necesita  Las vitaminas prenatales también podrían ayudar a disminuir el riesgo de ciertos defectos de nacimiento  · Pregunte cuánto peso usted debe aumentar samantha suazo embarazo  Demasiado aumento de peso o muy poco puede ser poco saludable para usted y suazo bebé  · Ejercicio: hable con suazo proveedor de [employer] ejercicio  El ejercicio moderado puede ayudarlo a mantenerse en forma  Suazo proveedor de [employer] ayudará a planificar un programa de ejercicios que sea seguro para usted samantha el [facility]  · Dania muchos líquidos mientras hace ejercicio para mantenerse hidratado  Tenga cuidado para evitar el sobrecalentamiento  o EVITE los ejercicios que pueden hacer que pierda el equilibrio   o Actividades que pueden poner a suazo bebé en riesgo, es decir, montar a nikita, bucear, esquiar o hacer snowboard  o Después de suazo primer trimestre, evite los ejercicios que requieren que se acueste boca arriba   o EVITE exceder virginia frecuencia cardíaca mayor de 140 latidos por minuto  Siempre que pueda mantener virginia conversación mientras hace ejercicio, es probable que suazo ritmo cardíaco sea aceptable   ; Siempre use el cinturón de seguridad   El cinturón de hombro debe estar sobre el pecho (entre los senos) y lejos del karson   Asegure el cinturón de regazo debajo de suazo vientre para que se ajuste cómodamente en coretta caderas y hueso pélvico   ; Realizar el ejercicio de Kegel   Imagínese deteniendo el flujo de orina, contrayendo los músculos de suazo piso pélvico  Mantenga lizette contracción samantha 10 segundos y suelte     Se pueden repetir 10-20 veces por día  · No fume  Si usted fuma, nunca es demasiado tarde para dejar de hacerlo  Fumar aumenta el riesgo de aborto espontáneo y otros problemas de ellis samantha suazo Deitra Marlo  Fumar puede causar que suazo bebé nazca antes de tiempo o que pese menos al nacer  Solicite información a suazo médico si usted necesita ayuda para dejar de fumar  · No consuma alcohol  El alcohol pasa de suazo cuerpo al bebé a través de la placenta  Puede afectar el desarrollo del cerebro de suazo bebé y provocar el síndrome de alcoholismo fetal (SAF)  SAF es un regulo de condiciones que causan [address], de comportamiento y de crecimiento  · Consulte con suazo médico antes de sudheer cualquier medicamento  Muchos medicamentos pueden perjudicar a suazo bebé si usted los yovany [address]  No tome ningún medicamento, vitaminas, hierbas o suplementos sin bobby consultar con suazo médico    · Nunca  use drogas ilegales o de la felipe (ailyn marihuana o cocaína) mientras está embarazada  Consejos de seguridad:   · Evite jacuzzis y saunas  No use un jacuzzi o un sauna mientras usted está embarazada, especialmente samantha el primer trimestre  Los [facility] y los saunas aumentan la temperatura de suazo bebé y el riesgo de defectos de nacimiento  · Evite la toxoplasmosis  Paden es [place] infección causada por comer carne cruda o estar cerca del excremento de un aide infectado  Paden puede causar malformaciones congénitas, aborto espontáneo y Javier Schein  Lávese las everardo después de tocar carne cruda  Asegúrese de que la carne esté ana cocida antes de comerla  Evite los huevos crudos y la Gabby Eaton  Use guantes o pida que alguien la ayude a limpiar la caja de arena del aide mientras usted Jalaine Cosier  · Consulte con suazo médico acerca de viajar  El [address] cómodo para viajar es samantha el mary trimestre  Pregunte a suazo médico si usted puede viajar después de las 36 semanas   Es posible que no pueda viajar en avión después de las 36 semanas  También le puede recomendar que evite largos viajes por carretera  Programe un control con suazo médico u obstetra según lo indicado:  Vaya a todas kate citas prenatales samantha suazo embarazo  Anote kate preguntas para que se acuerde de hacerlas samantha kate visitas  [place] y vómito en el embarazo       CUIDADO AMBULATORIO:   La náusea y el vómito en el embarazo  pueden suceder a cualquier hora del día  Estos síntomas usualmente comienzan antes de la semana 9 del embarazo y terminan para la semana 14 (mary trimestre)  Algunas mujeres pueden tener náusea o vómito por un tiempo prolongado  Estos síntomas pueden afectar a algunas mujeres samantha el embarazo  La náusea y el vómito no dañan a suazo bebé  Estos síntomas pueden dificultarle kate actividades diarias  Pregúntele a suazo Anabella Abel vitaminas y minerales son adecuados para usted  · Usted vomita más de 4 veces en 1 día  · Usted no ha podido retener líquidos en el estómago por más de 1 día  · Usted pierde más de 2 libras  · Usted tiene fiebre  · Kate náuseas y vómito continúan por más de 14 semanas  · Usted tiene preguntas o inquietudes acerca de suazo condición o cuidado  El tratamiento  para la náusea y el vómito en el embarazo generalmente no es necesario  Usted puede [employer] alimentos que come y en kate actividades para ayudar a controlar kate síntomas  Es posible que usted necesite probar varias cosas para determinar qué funciona mejor para usted  Hable con suazo médico si kate síntomas no mejoran con los cambios que se recomiendan a continuación  Es posible que usted necesite vitamina B6 y medicamento si estos cambios no ayudan o si kate síntomas se vuelven graves  Cambios de nutrición que usted puede realizar para controlar la náusea y el vómito:   · Coma porciones pequeñas samantha el día en vez de 3 comidas con porciones grandes  Es más probable que usted tenga náusea y vómito cuando suazo estómago está vacío   Consuma alimentos bajos en grasa y ricos en proteínas  Ejemplos son Paulett Whalan, frijoles, pavo y alma sin tra Rangel, ailyn galletas saladas, cereal seco o un sandwich chico antes de [place]  · Coma galletas saladas o pan snow antes de levantarse de suazo cama por la mañana  Levántese de la cama lentamente  Los movimientos repentinos podrían provocarle mareos y [place]  · Consuma alimentos blandos cuando se sienta con náuseas  Ejemplos de alimentos blandos son el pan snow, cereal seco, pasta sin Lequita Gavin y dobbs  Otros alimentos blandos incluyen a las [employer], plátanos, gelatina y pretzels  Evite los alimentos condimentados, grasosos y fritos  Evite otros alimentos que le provoquen náuseas  · [place] líquidos que contengan gengibre  [place] refresco de gengibre hecho con gengibre real o té de gengibre hecho con gengibre fresco rallado  Las [employer] o dulces de gengibre también podrían ayudar a aliviar la náusea y el vómito  · [place] líquidos entre alimentos en vez de tomarlos con los alimentos  [place] al menos 30 minutos después de comer para sudheer líquidos  [place] cantidades pequeñas de líquidos con frecuencia samantha el día para evitar la deshidratación  Consulte cuál es la cantidad de líquido que usted debería consumir al día  Otros cambios que usted puede realizar para controlar la náusea y el vómito:   · Evite los olores que la [place]  Los olores brandan podrían provocar que [employer] náuseas y el vómito, o podrían empeorarlo  Camine un poco, prenda un ventilador o trate de dormir con la ventana abierta para respirar aire fresco  Cuando esté cocinando, perico las ventanas para eliminar el olor que podría provocarle náuseas  · No se cepille coretta dientes inmediatamente después de comer  si eso le provoca náuseas  · Descanse cuando lo necesite  Comience [place] actividad lentamente y vuelva a suazo rutina normal conforme se empiece a sentir mejor    · Hable con suazo médico acerca de las vitaminas prenatales  Las vitaminas prenatales pueden provocar náuseas a algunas mujeres  Trate de tomárselas por la noche o con un bocadillo  Si gabriela cambio no le [facility], suazo médico podría recomendarle un tipo de vitamina diferente  · No use ningún medicamento, vitamina o suplemento para controlar coretta síntomas sin antes consultarlo con suazo médico   Varios medicamentos pueden dañar a suazo bebé que no ha nacido  · El ejercicio de ligero a moderado  podría ayudar a aliviar coretta síntomas  También podría ayudarla a dormir mejor por la noche  Pregunte a suazo médico acerca del mejor plan de ejercicio para usted  Beneficios de la lactancia materna   son menos propensos a desarrollar alergias   Tienen virgin[place] mayor protección contra la meningitis bacteriana   Tienen menos infecciones del oído medio   Tienen menos dificultades de aprendizaje    Tienen menos dificultades de comportamiento   Tienen un coeficiente intelectual más alto   Tienen tasas más bajas de enfermedades respiratorias   Tienen fawad obesidad    Son menos propensos a desarrollar diabetes juvenil y algunos cánceres infantiles   Tienen menos probabilidades de morir por Síndrome de erte [facility]   Un bebé más saludable significa menos visitas al médico y a la farmacia   La lactancia materna es ecológica  No hay nada que tirar   La lactancia materna es gratis; La fórmula puede costar más de $ 1000 samantha el primer año de mary alice   Hay menos sangrado vaginal inmediatamente después del parto y un retorno más rápido a suazo tamaño anterior al [employer]  Las [place] que amamantan samantha un total de 2 años tienen  ; [place] disminución del 40% en el riesgo de cáncer de seno  ; Disminución del riesgo de cáncer de ovario   ; Tasas más bajas de osteoporosis, diabetes y enfermedades del corazón  Importancia de la lactancia materna exclusiva     Al proporcionar el alimento ideal para el crecimiento y desarrollo saludable de los bebés, solo se les alimenta con [address], esto es amamantamiento exclusivo   La lactancia materna [facility] primeros 6 meses es muy importante para mejorar la ellis de un bebé y reducir las enfermedades infantiles  Lactancia materna exclusiva samantha los primeros 6 meses  [address] [facility] Pediatría recomienda la lactancia materna exclusiva samantha los primeros seis meses y la lactancia materna continua con suplementos de sólidos samantha los próximos 6 meses  Inicio temprano de la lactancia materna   Las mujeres que alimentan a coretta bebés dentro de la primera hora de nacimiento se conocen ailyn inicio temprano de la lactancia materna y aseguran que el recién nacido reciba calostro   El calostro es rico en anticuerpos y nutrientes esenciales  Compartiendo la habitación   Puede estabilizar la respiración y la frecuencia cardíaca del recién nacido   Reduce el llanto   Mejorar la capacidad del recién nacido para mantener la temperatura y los niveles de azúcar en la maricarmen   Estimulación temprana del sistema inmunitario del bebé   efectos calmantes   Enlace más fácil y rápido   El compartir la habitación promueve conocer a suazo recién nacido   El alojamiento conjunto facilita la lactancia materna   Las mujeres que se alojan con coretta recién nacidos producen [employer] y producen un buen suministro de [place]   Se hace más fácil reconocer las señales de alimentación del bebé   Los bebés tienen sesiones de lactancia más largas   Las mujeres que comparten habitación con coretta recién nacidos tienen más probabilidades de amamantar exclusivamente en comparación con las mujeres que están separadas de coretta recién nacidos  Piel con piel   El contacto piel con piel debe ocurrir independientemente de la preferencia de alimentación de Kwabena borrego o el modo de [place]     Después del parto de suazo bebé, es importante que virginia madre jennifer y suazo bebé tengan un contacto ininterrumpido de piel a piel    El contacto piel con piel debe ocurrir inmediatamente después del nacimiento samantha al menos [place] o dos horas y [place] después de la primera alimentación para las madres que [place]   El contacto piel con piel significa colocar recién nacidos secos y sin ropa sobre el pecho desnudo de suazo [place], con mantas calientes cubriendo la espalda del bebé   Todos los procedimientos de rutina, ailyn las evaluaciones de [place] y recién nacidos, pueden realizarse samantha el contacto piel con piel o pueden retrasarse hasta después del período sensible inmediatamente después del nacimiento   Estamos orgullosos de ofrecer amplios servicios educativos y de apoyo para alentar a las madres a amamantar   Jenise servicio ofrece información, educación y [de-identified] para mujeres que catrachito amamantar  Las [place] pueden dejar un mensaje o [place] pregunta sobre la lactancia en línea en cualquier momento del día  Las enfermeras responderán dentro de las 72 horas   La línea de respuesta de lactancia materna es [phone] ([phone])  NO deje mensajes urgentes o emergentes en esta línea de mensaje  Comuníquese con suazo médico Raenette Gula si tiene alguna pregunta o inquietud urgente   En harish de sospecha de virgin[place] afección médica de emergencia, [facility]      Attaching your baby at the Breast (English): [email or web address]    Attaching your baby at the Breast (Polish):  [email or web address]

## 2021-01-27 NOTE — LETTER
Work Letter    Montrell Villa  1995  [address] [address] [facility] [id number]    Dear Montrell Villa,      01/27/21        Your employee is a patient at [facility]  We recommend that all pregnant women:    1  Have a well-ventilated workspace  2  Wear low-heeled shoes  3  Work no more than 40 hours per week  4  Have a 15 minute break every 2 hours and at least 30 minutes for a meal break  5  Use good body mechanics by bending at your knees to avoid back strain and lift no more than 20 pounds without assistance  Will need assistance with lifting over 20 lbs  6  Have ready access to bathrooms and water  She may continue to work until her due date unless medical complications arise  We anticipate she may return to work in 6-8 weeks after delivery       Sincerely,    [facility]

## 2021-01-29 ENCOUNTER — TELEPHONE (OUTPATIENT)
Dept: OBGYN CLINIC | Facility: CLINIC | Age: 26
End: 2021-01-29

## 2021-01-29 NOTE — TELEPHONE ENCOUNTER
pn pt called c/o sore throat and nose burning  Pt was told to call pcp  Pt verbalized understanding

## 2021-02-08 NOTE — PATIENT INSTRUCTIONS
COVID-19 y el embarazo   CUIDADO AMBULATORIO:   Lo que usted necesita saber sobre la COVID-19 y el embarazo: La enfermedad por coronavirus 2019 (COVID-19) es causada por el nuevo virus descubierto por Tanvir Callejasr a fines de 2019  Los coronavirus generalmente causan infecciones de las vías respiratorias superiores (nariz, garganta y pulmones), ailyn un resfriado  El nuevo virus también puede causar afecciones respiratorias inferiores graves, ailyn la neumonía o el síndrome de dificultad respiratoria aguda (SDRA)  El [place] suazo riesgo de enfermedad grave  La COVID-19 Michelle Riley conducir a un parto prematuro de suazo bebé  La mayoría de los bebés que se infectan con el nuevo virus no desarrollan efectos graves, pascual algunos [place]  Es importante que usted y suazo bebé estén seguros samantha el Reva Cook y [place]  Si ahmet que usted, suazo bebé o alguien en suazo casa pueden estar infectados: Evans lo siguiente para proteger a otras personas:  · Si se requiere atención de emergencia, avise al operador de la posible infección, o llame antes y avise al servicio de urgencias  · Llame a un médico para recibir instrucciones si los síntomas son leves  Cualquier persona que pueda estar infectada no  debe llegar sin llamar bobby  El médico deberá proteger a los miembros del personal y a otros pacientes  · La persona que puede estar infectada debe usar un tapabocas mientras reciben [place]  [place] ayudará a reducir el riesgo de infectar a otras personas  Nadie que sea fawad de 2 años, que tenga problemas respiratorios o que no pueda quitárselo debe usar un tapabocas  El médico puede darle instrucciones para cualquier persona que no pueda usar un tapabocas  Llame al [facility] de emergencias (911 en los [place]) o pídale a alguien que lo lleve al departamento de emergencias si:  · Usted tiene dificultad para respirar o falta de aliento mientras descansa      · Usted siente presión o dolor en el pecho que dura más de 5 minutos  · Usted tiene confusión o es difícil despertarlo  · Coretta labios o charleen están azules  · Usted tiene fiebre de 104 ºF (40 °C) o más  Llame a suazo médico si:  · Tiene signos o síntomas de COVID-19  Intente llamar dentro de las 24 horas siguientes a cuando empiece a sentirse mal     · No  tiene síntomas de COVID-19 pascual tuvo contacto físico cercano dentro de los 14 días con alguien que cathy positivo  · Usted tiene preguntas o inquietudes acerca de suazo condición o cuidado  Cómo se propaga el coronavirus 2019: El virus se propaga rápida y fácilmente  Puede infectarse si está en contacto con virginia gran cantidad del virus, incluso samantha poco tiempo  También puede infectarse por estar cerca de virginia pequeña cantidad del virus samantha mucho tiempo  A continuación se indican las formas en que se ahmet que se propaga el virus, pascual es posible que surja más información:  · Las gotitas son la forma más común de propagación de todos los coronavirus  El virus puede viajar en gotitas que se brittny cuando virginia persona habla, tose o estornuda  Cualquiera que respire las gotitas o que las gotitas se le metan en los ojos puede infectarse con el virus  · El contacto de persona a persona puede propagar el virus  Por ejemplo, virginia persona con el virus en coretta everardo puede propagarlo al darle la mano a alguien  · El virus puede permanecer en objetos y superficies  [place] persona puede contraer el virus en coretta everardo al tocar el objeto o la superficie  La infección se produce si la persona se toca los ojos o la boca sin antes lavarse las everardo  Aún no se sabe cuánto tiempo puede permanecer el virus en un objeto o superficie  Por eso es importante limpiar todas las superficies que se usan regularmente  · Un animal infectado puede ser [place] de infectar a virginia persona que lo toque  [place] puede ocurrir en [facility] vivos o en virginia di      Protéjase y proteja a suazo bebé mientras esté Jatinder Shaffer: Si tiene COVID-19 samantha el [facility], los Jorden vigilarán a usted y a suazo bebé de cerca  Colabore con suazo médico u obstetra  Si no tiene ninguno [facility] Travelers, los expertos recomiendan que se ponga en contacto con un centro de ellis o departamento de ellis de la comunidad local  La mejor manera de prevenir la infección es evitar a cualquiera que esté infectado, pascual esto puede ser difícil de lograr  [place] persona infectada puede propagar el virus antes de que se desarrollen los signos o síntomas, o incluso si los signos o síntomas nunca se desarrollan  Lo siguiente puede ayudar a mantenerlos a usted y a suazo bebé a edmund:     · [address]  Utilice agua y [place]  Frótese las everardo enjabonadas, [facility] dedos  Lávese el frente y el dorso de cada [place], y Yuri dedos  Use los dedos de virginia mano para restregar debajo de las uñas de la [place]  Lávese samantha al menos 20 segundos  Enjuague con agua corriente caliente samantha varios segundos  Séquese las everardo con virginia toalla limpia o virginia toalla de papel  Puede usar un desinfectante para everardo que contenga alcohol, si no hay agua y jabón disponibles  Si tiene que Geovanna lucas everardo antes de salir de suazo casa y cuando regrese a suazo casa  Lávese las everardo después de guardar los artículos  Tenga cuidado con lo que toca [address] [place]  · Protéjase de los estornudos y la tos  Gire la tamanna alejándose y cúbrase la boca y la nariz si está cerca de alguien que está estornudando o tosiendo  [place] ayuda a protegerse de las gotitas de la persona  · Acostúmbrese a no tocarse la charleen  Si tiene el virus [employer], puede transferirlo a los ojos, la nariz o la boca e infectarse  · [address] distanciamiento social a nivel local, nacional y [place]  El distanciamiento social significa que las personas se mantienen físicamente separadas, lo suficiente ailyn para que el virus no se propague de Velia Whitting[place] persona a otra   Si tiene que [facility] [place] multitudes y las grandes reuniones  Por ejemplo, conciertos, fiestas, eventos deportivos, servicios religiosos y conferencias  Se pueden formar multitudes en [address], los parques y las atracciones turísticas  No entre en restaurantes o bares abarrotados  Estará lo suficientemente cerca de [employer] ailyn para poder infectarse  · Use un tapabocas cuando esté cerca de otros  Los tapabocas ayudan evitar que el virus se propague a otras personas en las gotitas  Igualmente debe mantenerse al menos 6 pies (2 metros) de los demás mientras [place] el tapabocas  Asegúrese de que puede respirar fácilmente a través del tapabocas  No utilice tapabocas que tengan válvulas de respiración o respiraderos  El virus puede salir por la válvula o el respiradero y contagiar a otros  No se quite el tapabocas para hablar, toser o estornudar  No coloque un protector facial ni un tapabocas a un recién nacido  [place] aumenta el riesgo de síndrome de muerte súbita del lactante (SMSL)  · Manténgase al menos a 6 pies (2 metros) de distancia de cualquiera que no viva en suazo casa  Mantenga esta distancia cada vez que salga de suazo casa y esté cerca de otra persona  No le dé la mano, abrace o bese a virginia persona ailyn saludo  Párese o camine lo más lejos posible de los demás, especialmente alrededor de cualquiera que esté estornudando o tosiendo  Si tiene [employer] transporte público (ailyn el autobús, el metro o un viaje compartido), intente sentarse o mantenerse alejado de los demás  No vaya a la casa de [place] persona, a menos que sea necesario  No vaya de visita, aunque se sienta solo, o la persona lo esté  Vaya solo si necesita ayudarla  · Manténgase a edmund si debe que salir a trabajar  Puede que tenga un trabajo que solo se hace fuera de suazo casa y que se considera esencial  Mantenga la distancia física entre usted y los demás trabajadores tanto ialyn sea posible  Siga las reglas de suazo empleador para que todos estén a edmund      · Limpie y desinfecte las superficies y los objetos de alto contacto de suazo casa a menudo  Use virginia solución o toallitas desinfectantes  Puede hacer virginia solución diluyendo 4 cucharaditas de lejía en 1 cuarto de galón (4 tazas) de agua  Limpie y desinfecte aunque piense que nadie que viva o haya entrado en suazo casa esté infectado con el virus  [address] y los objetos de la habitación donde dormirá suazo bebé, especialmente abiodun antes de cristian a roger  [address] y desinfectar  Tenga cuidado con los productos de limpieza  Jena las etiquetas para asegurarse de que son [place] para usarlas samantha el embarazo  Javid las ventanas para asegurarse de que tiene virginia buena ventilación  Lo que puede hacer para tener un embarazo saludable samantha el brote de COVID-19:  · Asista a todas las citas de control prenatal y posnatal  Es posible que pueda tener ciertas citas de control prenatal sin tener que ir al consultorio del médico  Algunos médicos ofrecen citas por teléfono, video u otros tipos de citas  También puede obtener recetas para varios meses de virginia vez  [place] ayudará a reducir el número de viajes que tiene que hacer a la farmacia para reabastecerse  Si necesita ir al [employer], tome precauciones  Use un tapabocas antes de entrar al [employer]  No se pare ni se siente a menos de 6 pies (2 metros) de nadie en la sarah de espera, si es posible  No se pare ni se siente cerca de nadie que no use un tapabocas  · [address] [place] vacunas recomendadas  No hay ninguna vacuna disponible para el nuevo coronavirus  Se recomiendan otras vacunas samantha el embarazo  Debe recibir la vacuna contra la influenza (gripe) tan pronto ailyn se lo recomienden, generalmente en septiembre u octubre  Se recomienda virginia vacuna Tdap (tétanos, difteria, tos [place]) en Rande Dalia  Si es posible, vacúnese cuando tenga de 27 a 36 semanas de embarazo   Suazo médico puede indicarle si necesita [employer], y cuándo aplicárselas  · [address]  Las vitaminas prenatales deben contener ácido Lauree Maxine unos 213 microgramos (mcg) de ácido fólico cada día samantha el embarazo  El ácido fólico ayuda a formar el cerebro y la médula sampson del bebé en el comienzo del [facility]  · Consuma alimentos saludables y variados  Los alimentos saludables son importantes, incluso si yovany virginia vitamina prenatal  Los alimentos saludables contienen nutrientes que ayudan a mantener el sistema inmunitario adam  Los alimentos saludables incluyen, por ejemplo, verduras, frutas, panes y cereales integrales, raimundo Roberto y de aves, y pescado, productos lácteos bajos en grasas y frijoles cocidos  No consuma alimentos o bebidas crudas, poco cocinadas o no pasteurizadas  Los alimentos sin pasteurizar son aquéllos que no pasaron por el proceso de calentamiento (pasteurización) que destruye la bacteria  Suazo médico o un dietista pueden ayudarla a crear un plan de comidas saludables  · Consulte con suazo médico acerca de hacer ejercicio  El ejercicio moderado ayuda a fortalecer suazo sistema inmunitario  Suazo médico puede ayudarla a planificar un programa de ejercicios que sea seguro para usted samantha el [facility]  Es posible que necesite hacer ejercicio en casa si no puede hacer ejercicio al [facility], ailyn por ejemplo, caminar en un parque  Si quiere hacer yoga para embarazadas u otras actividades en regulo, tenga cuidado  Manténgase al menos a 6 pies (2 metros) de los demás alumnos y del instructor  Lávese las everardo antes de dejar el lugar  Siga las instrucciones del lugar para prevenir infecciones  · Intente reducir suazo estrés  Puede que se sienta más estresado que de costumbre debido al brote de COVID-19  También es posible que sienta estrés por no poder compartir suazo embarazo con otras personas   Por ejemplo, es posible que no pueda tener a alguien que tobi Carlson las visitas prenatales o las ecografías  Hable con coretta médicos [employer] formas de controlar el estrés samantha Yuri  Escoja 1 o 2 veces al día para danay las noticias  La marisela observación de las noticias [employer] COVID-19 puede aumentar coretta niveles de estrés  Planifique un cronograma de sueño para acostarse y levantarse a la misma hora todos los [address]  · No fume cigarrillos, no tome alcohol ni use drogas  La nicotina y otros químicos de los cigarrillos y cigarros pueden dañar al bebé y jolley ellis  El alcohol puede aumentar jolley riesgo de aborto espontáneo  Jolley bebé también puede nacer demasiado pequeño o tener otros problemas de [place]  Ciertas drogas pueden transmitirse a jolley bebé antes de que nazca  Algunas pueden pasar a través de la [employer]  Es mejor dejar el cigarrillo, el alcohol y las drogas antes de Lowella Due y no volver a empezar después de que nazca el bebé  Pida información a jolley médico si usted actualmente [place] estos productos y necesita ayuda para dejarlos  Sharolyn Ramy a jolley recién nacido samantha el parto y [place] esté en el hospital: No se sabe con certeza si un bebé por nacer puede infectarse por el virus que causa la COVID-19  Algunos recién nacidos crain dado positivo para el virus  Los recién nacidos pueden haberse infectado antes, samantha o después del nacimiento  El mayor riesgo es que un recién nacido esté en contacto cercano con virginia persona infectada  · Pregunte sobre la separación temporal si usted tiene COVID-19  La separación temporal significa que trasladan a jolley recién nacido a virginia habitación diferente a la suya  Podrá sudheer la decisión si Alicia Bacon esto  La separación ayudará a reducir el riesgo de infección de jolley recién nacido  Igualmente podrá amamantar a jolley recién nacido  Kasia Held necesite extraer la leche de coretta pechos  Kit Astudillo que no tenga COVID-19 alimentará a jolley recién nacido con la [employer] extraída  En cambio, puede elegir [employer] traigan a jolley bebé cuando quiera amamantarlo   Scrip-t precauciones para mantener la seguridad de jolley bebé  Lávese las everardo y la piel alrededor de los pezones antes de sostener al bebé  Use un tapabocas mientras amamanta  · Pallavi Gain si tiene COVID-19 y no decide optar por la separación temporal  Los médicos mantendrán a jolley recién nacido por lo menos a 6 pies (2 metros) alejado de usted tanto ailyn sea posible  Es posible que coloquen a jolley recién nacido en virginia incubadora  La incubadora ayudará a proteger al recién nacido contra la infección  Siempre Valente-Lou y póngase un tapabocas cuando sostenga, toque o tenga contacto cercano con jolley recién nacido  · Jairo Payne visitas  Es posible que la instalación no permita visitantes a los recién [employer]  Si se permiten las visitas, puede que tenga que limitar el número de visitantes a la vez  No permita que nadie que se sepa o sospeche que tenga COVID-19 lo visite  Incluso sin signos o síntomas, la persona puede infectar a jolley recién nacido o a otras personas en la habitación  Todos los visitantes deben lavarse las everardo y usar tapabocas limpios antes de entrar en jolley habitación  El tapabocas debe permanecer colocado samantha toda la visita  No deje que nadie se quite el tapabocas para hacer muecas a jolley bebé, hablar, estornudar o toser  No permita que nadie bese al bebé  Maxene Heads a jolley recién nacido en casa:  · Puede elegir continuar la separación temporal si usted tiene COVID-19  Puede hacerlo si un adulto que no tiene COVID-19 puede cuidar de jolley recién nacido  Jolley médico puede darle instrucciones sobre cómo hacer esto de manera goodwin en casa  Solo tenga un contacto cercano con jolley recién nacido cuando sea necesario  Recuerde lavarse las everardo y ponerse bobby un tapabocas limpio  Sebastian vez deba seguir extrayendo la [employer]  Un adulto susan puede alimentar a jolley recién nacido con la [employer] extraída  En cambio, puede elegir [facility] traigan a jolley bebé cuando quiera amamantarlo  [place] precauciones para mantener la seguridad de suazo bebé  Lávese las everardo y la piel alrededor de los pezones antes de sostener al bebé  También tendrá que usar un tapabocas [employer]  · Use tapabocas de manera goodwin  Todos los que tienen COVID-19 deben usar un tapabocas limpio mientras estén a menos de 6 pies (2 metros) de suazo recién nacido  [employer] incluye a otros niños en suazo casa que tienen 2 años o [place]  No coloque un tapabocas ni un protector facial de plástico a suazo recién nacido  Cualquier tipo de [place] el riesgo de suazo recién nacido de sufrir síndrome de muerte súbita del lactante (SMSL)  No utilice tapabocas en niños menores de 2 años ni en personas que tengan problemas respiratorios o no puedan quitárselo  · Tenga cuidado con las visitas  Continúe con las precauciones que usó en el hospital  No permita que nadie que se sepa o sospeche que tenga COVID-19 visite a suazo recién nacido  Evans que los visitantes usen un tapabocas limpio antes de entrar a suazo casa  Evans que se laven las everardo en cuanto entren  El tapabocas debe permanecer colocado samantha toda la visita  · Asista a todas las citas de [address]  Es posible que pueda tener algunas citas por teléfono o por video  Otras citas tendrán que ser en persona, ailyn para las vacunas  Las vacunas se administran normalmente a los bebés a ciertas edades  Hasta que la COVID-19 esté bajo control, el médico de suazo recién Dalton Grman dará un calendario de vacunas  Es importante que suazo recién nacido reciba todas las vacunas recomendadas  Lo que necesita saber acerca de la lactancia: La lactancia materna asmantha los primeros 6 meses disminuye el riesgo de que el bebé sufra infecciones respiratorias (pulmones), alergias, asma y problemas estomacales  La Mary D también sirve para que suazo bebé desarrolle un sistema inmunitario adam  La leche materna se considera [place], incluso si usted tiene COVID-19   Los expertos creen que el virus que causa la COVID-19 no se propaga en la [employer]  Siga las siguientes pautas para proteger a jolley bebé:  · [employer] las everardo antes de cada sesión de lactancia o de extracción de [employer]  Aunque no tenga COVID-19, puede transferir el virus de coretta everardo a jolley bebé o al extractor  Use agua y jabón para lavarse las everardo siempre que sea posible  Puede usar un desinfectante para everardo que contenga alcohol, si no hay agua y jabón disponibles  · Limpie y desinfecte jolley extractor de [place] después de [address]  Siga las instrucciones del fabricante para limpiar y desinfectar el extractor  Es importante no utilizarlo hasta que esté limpio y desinfectado  · Si tiene COVID-19:     ? Use un tapabocas mientras amamanta o se extrae la leche  [place] ayudará a evitar que transmita el virus a través de las gotitas cuando [place], [place], estornude o se ría  El virus puede permanecer en las superficies ailyn las del extractor samantha horas o días  ? Si es posible, pídale a alguien que no esté infectado que alimente a jolley bebé con biberón  Evans que la persona se lave las everardo con agua y jabón antes de cada alimentación  La persona puede alimentar a jolley recién nacido con Jose Abdi o con fórmula  Acuda a coretta consultas de control con jolley médico u obstetra según le indicaron: Anote coretta preguntas para que se acuerde de hacerlas samantha coretta visitas  Para más información:  · Centers for Disease Control and Prevention  [address] , [address]  Phone: [phone]  Web Address: [employer]     © 4328 [facility]71 Information is for End User's use only and may not be sold, redistributed or otherwise used for commercial purposes  All illustrations and images included in CareNotes® are the copyrighted property of A SRINATH A ELEAZAR , Inc  or [address] [place] [address] es sólo para uso en educación  Jolley intención no es darle un consejo médico sobre enfermedades o tratamientos   Colsulte con Joslyn LIRIANO' , enfermera o farmacéutico antes de seguir cualquier régimen médico para saber si es seguro y efectivo para usted  El embarazo de la semana 23 a la 26   LO QUE NECESITA SABER:   ¿Qué cambios están ocurriendo en mi cuerpo? Ahora usted está cerca o al principio del tercer trimestre  El tercer trimestre comienza a las 24 semanas y concluye al momento del [place]  Conforme suazo bebé crece más, usted podría desarrollar ciertos síntomas  Estos podrían incluir dolor en suazo espalda o en la parte inferior de los costados de suazo abdomen  Es posible que también se le formen estrías en suazo abdomen, senos, muslos o glúteos  Usted también podría presentar estreñimiento  ¿Cómo me holly cuidar en esta etapa de mi embarazo? · Consuma alimentos saludables y variados  Alimentos saludables incluyen frutas, verduras, panes de koffi integral, alimentos lácteos bajos en grasa, frijoles, raimundo magras y pescado  Hico líquidos ailyn se le haya indicado  Pregunte cuánto líquido debe sudheer cada día y cuáles líquidos son los más adecuados para usted  Limite el consumo de cafeína a menos de [address]  Limite el consumo de pescado a 2 porciones cada semana  Escoja pescado con concentraciones bajas de zahra ailyn atún al natural enlatado, camarón, salmón, bacalao o tilapia  No coma pescado con concentraciones altas de zahra ailyn pez sarah, caballa gigante, pargo rayado y tiburón  · Controle suazo dolor de espalda  No esté de pie por largos periodos de tiempo ni levante objetos pesados  Use virginia buena postura mientras esté de pie, se agache o se doble  Use zapatos de tacón bajo con un buen soporte  Descansar puede también ayudarla a aliviar el dolor de espalda  Pregunte a suazo médico acerca de ejercicios que usted pueda hacer para fortalecer los músculos de suazo espalda  · [address]  Suazo necesidad de ciertas vitaminas y [address] [facility], ailyn el ácido fólico, aumenta samantha el [address] prenatales proporcionan algunas de las vitaminas y minerales adicionales que usted necesita  Las vitaminas prenatales también podrían ayudar a disminuir el riesgo de ciertos defectos de nacimiento  · Consulte con jolley médico acerca de hacer ejercicio  El ejercicio moderado puede ayudarla a mantenerse en forma  Jolley médico la ayudará a planear un programa de ejercicios que sea seguro para usted samantha jolley Adebayo Canes  · No fume  Fumar aumenta el riesgo de aborto espontáneo y otros problemas de ellis samantha jolley [place]  Fumar puede causar que jolley bebé nazca antes de tiempo o que pese menos al nacer  Solicite información a jolley médico si usted necesita ayuda para dejar de fumar  · No consuma alcohol  El alcohol pasa de jolley cuerpo al bebé a través de la placenta  Puede afectar el desarrollo del cerebro de jolley bebé y provocar el síndrome de alcoholismo fetal (SAF)  El SAF es un regulo de condiciones que causan problemas [place], de comportamiento y de crecimiento  · Consulte con jolley médico antes de sudheer cualquier medicamento  Muchos medicamentos pueden perjudicar a jolley bebé si usted los yovany [address] [place] [address]  No tome ningún medicamento, vitaminas, hierbas o suplementos sin bobby consultar con jolley Maryln Haver  use drogas ilegales o de la felipe (ailyn marihuana o cocaína) mientras está embarazada  ¿Cuáles son [place] consejos de seguridad samantha el embarazo? · Evite jacuzzis y saunas  No use un jacuzzi o un sauna mientras usted está embarazada, especialmente samantha el primer trimestre  Los [place] [facility] [facility] y los saunas aumentan la temperatura de jolley bebé y el riesgo de defectos de nacimiento  · Evite la toxoplasmosis  [place] infección causada por comer carne cruda o estar cerca del excremento de un aide infectado  [place] puede causar malformaciones congénitas, aborto espontáneo y Javier Schein  Lávadarsh las everardo después de tocar carne cruda  Asegúrese de que la carne esté ana cocida antes de comerla  Evite los huevos crudos y la Leellen Dorsey  Use guantes o pida que alguien la ayude a limpiar la caja de arena del aide mientras usted [place]  ¿Qué cambios están ocurriendo con mi bebé? Para las 26 semanas, jolley bebé pesará alrededor de 2 libras  Jolley bebé medirá alrededor de 10 pulgadas de antoni desde el melina de la tamanna hasta la rabadilla (parte inferior del bebé)  Los movimientos de jolley bebé son mucho más brandan en esta etapa  Los ojos de jolley bebé nancy están completamente formados y pueden abrirse parcialmente  Jolley bebé también duerme y se despierta  ¿Qué necesito saber acerca del cuidado prenatal? Jolley médico le revisará jolley presión arterial y [place]  Es posible que también necesite lo siguiente:  · Un examen de orina también podría realizarse para revisarle el azúcar y la proteína  Estas son señales de diabetes gestacional o de infección  La proteína en jolley orina también podría ser virginia señal de preeclampsia  La preeclampsia es virginia condición que puede desarrollarse samantha la semana 21 o después en jolley embarazo  Esta provoca presión arterial jameson y [employer] con coretta riñones y [place]  · La altura uterina es virginia medición del útero para controlar el desarrollo de jolley bebé  [employer] por lo general es igual al número de 11 Rao Kincaid que usted tiene de embarazo  · El ritmo cardíaco de jolley bebé será revisado  ¿Cuándo holly buscar atención inmediata? · Usted presenta un adam dolor de tamanna que no desaparece  · Usted tiene cambios en la visión nuevos o en aumento, ailyn visión borrosa o con manchas  · Usted tiene inflamación nueva o creciente en jolley charleen o everardo  · Usted tiene manchado o sangrado vaginal     · Usted rompe cyril o siente un chorro o gotas de agua tibia que le está bajando por jolley vagina      ¿Cuándo holly comunicarme con mi médico?  · Usted tiene calambres, presión o tensión abdominal     · Usted tiene un cambio en la secreción vaginal     · Usted tiene un sangrado leve  · Usted tiene escalofríos o fiebre  · Usted tiene comezón, ardor o dolor vaginal     · Usted tiene virgin[place] secreción vaginal amarillenta, verdosa, serge o de [employer]  · Usted tiene dolor o ardor al [place], orina menos de lo habitual o tiene [place] rosada o sanguinolenta  · Usted tiene preguntas o inquietudes acerca de suazo condición o cuidado  ACUERDOS SOBRE SUAZO CUIDADO:   Usted tiene el derecho de ayudar a planear suazo cuidado  Aprenda todo lo que pueda sobre suazo condición y ailyn darle tratamiento  Discuta coretta opciones de tratamiento con coretta médicos para decidir el cuidado que usted desea recibir  Usted siempre tiene el derecho de rechazar el tratamiento  Esta información es sólo para uso en educación  Suazo intención no es darle un consejo médico sobre enfermedades o tratamientos  Colsulte con suazo Raegan Lint farmacéutico antes de seguir cualquier régimen médico para saber si es seguro y efectivo para usted  © Copyright [address] Information is for End User's use only and may not be sold, redistributed or otherwise used for commercial purposes  All illustrations and images included in CareNotes® are the copyrighted property of A D A [employer]  or [address] [address] [facility] SABER:   ¿Qué es la clamidia? La clamidia es [place] enfermedad de transmisión sexual (ETS)  Es provocada por virgin[place] bacteria que por lo general se propaga al [employer] sexuales por vía vaginal, oral o anal  Usted corre mayor riesgo de contraer clamidia si tiene otra infección de transmisión sexual, ailyn la [place]  También corre más riesgo de contraer clamidia si tiene relaciones sexuales con más de 1 persona  ¿Cuáles son los signos y síntomas de la clamidia? Es posible que usted no presente síntomas   Aún si no tiene síntomas, puede contagiar la infección a suazo moris sexual  Aldon [place] signos y síntomas de la clamidia son los siguientes:  · Enrojecimiento o picazón vaginal    · Flujo proveniente de la vagina, el pene o el recto    · Dolor al orinar    · Dolor samantha las relaciones sexuales    · Dolor abdominal    · Dolor en los testículos    · Fiebre o escalofríos    ¿Cómo se diagnostica la clamidia? Suazo médico le realizará un examen físico y le preguntará si sufre de otras condiciones de [place]  Es posible que usted necesite los siguientes exámenes:  · Podrían sudheer Shaylee Lion del líquido de suazo vagina o pene  Esta será examinada en busca de la bacteria que provoca la clamidia  · Shaylee Lion de [place] se pueden sudheer para examinar la bacteria que provoca la clamidia  ¿Cómo se trata la clamidia? Se recetan antibióticos para aniquilar la bacteria que causa la clamidia  Tómelos shona [employer]  La clamidia que no recibe tratamiento podría [employer] a suazo sistema reproductivo (ovarios, útero, trompas de falopio), dolor crónico o dificultad para quedar embarazada  También podría provocar un embarazo ectópico (embarazo fuera del [place])  ¿Cómo puedo controlar los síntomas? · Lindalee Sheerer y seca el área de coretta genitales  [place] ducha en lugar de sudheer kely de inmersión y use jabón sin perfume  · No tome duchas vaginales a menos que suazo médico lo autorice  No use aerosoles ni polvos de higiene femeninos  ¿Cómo puedo evitar el contagio de la clamidia? · Lávese las everardo frecuentemente  Utilice agua y [place]  Lávese las everardo después de usar el baño  [employer]oper ayuda a evitar que la infección se propague a otras partes de suazo cuerpo, ailyn los ojos  · Use un condón de látex para evitar el contagio de la clamidia y otras ETS  Use un condón nuevo cada vez que tenga relaciones sexuales  · Hable con coretta parejas sexuales  Informe a todas las personas con las que haya tenido [employer] últimos 3 meses que tiene clamidia  Es posible que ellos también tengan la infección y necesiten tratamiento   Pregunte a coretta parejas sexuales que se maynor un examen antes de [employer]  relaciones sexuales  · No tenga relaciones sexuales hasta que usted y suazo moris hayan terminado de sudheer todo el antibiótico  Pregunte a suazo médico cuándo puede tener relaciones sexuales sin correr peligro  · Hágase exámenes regulares de ETS  Pregunte a suazo médico con qué frecuencia debe hacerse exámenes de ETS  Podría indicarle que se damaso virginia examen después de [employer]  relaciones sexuales con virginia moris nueva  · Informe a suazo médico si usted está embarazada  Usted puede transmitir la clamidia a suazo bebé [employer]  Suazo bebé podría tener virginia infección en el gui o neumonía  La clamidia también podría provocar que suazo bebé nazca antes de [place]  El tratamiento temprano podría evitar que usted le transmita la clamidia a suazo bebé  ¿Cuándo holly buscar atención inmediata? · Tiene fiebre  · Usted tiene náuseas o no puede dejar de vomitar  · Usted tiene dolor abdominal intenso  ¿Cuándo holly comunicarme con mi médico?  · Coretta signos y síntomas iraheta más de 1 semana o empeoran samantha el tratamiento  · Coretta signos y síntomas regresan después del tratamiento  · Usted tiene [place] [address]  · Usted tiene preguntas o inquietudes acerca de suazo condición o cuidado  ACUERDOS SOBRE SUAZO CUIDADO:   Usted tiene el derecho de ayudar a planear suazo cuidado  Aprenda todo lo que pueda sobre suazo condición y ailyn darle tratamiento  Discuta coretta opciones de tratamiento con coretta médicos para decidir el cuidado que usted desea recibir  Usted siempre tiene el derecho de rechazar el tratamiento  Esta información es sólo para uso en educación  Suazo intención no es darle un consejo médico sobre enfermedades o tratamientos  Colsulte con suazo Zaina Royal farmacéutico antes de seguir cualquier régimen médico para saber si es seguro y efectivo para usted    © Copyright [facility] [address] Information is for End User's use only and may not be sold, redistributed or otherwise used for commercial purposes   All illustrations and images included in CareNotes® are the copyrighted property of A D A M , Inc  or Olga Hernandez

## 2021-02-09 ENCOUNTER — TELEPHONE (OUTPATIENT)
Dept: OBGYN CLINIC | Facility: CLINIC | Age: 26
End: 2021-02-09

## 2021-02-10 ENCOUNTER — ROUTINE PRENATAL (OUTPATIENT)
Dept: OBGYN CLINIC | Facility: CLINIC | Age: 26
End: 2021-02-10

## 2021-02-10 VITALS
SYSTOLIC BLOOD PRESSURE: 103 MMHG | WEIGHT: 169 LBS | DIASTOLIC BLOOD PRESSURE: 69 MMHG | HEIGHT: 62 IN | BODY MASS INDEX: 31.1 KG/M2 | HEART RATE: 94 BPM

## 2021-02-10 DIAGNOSIS — O34.219 MATERNAL CARE FOR SCAR FROM PREVIOUS CESAREAN DELIVERY, UNSPECIFIED SCAR TYPE: ICD-10-CM

## 2021-02-10 DIAGNOSIS — O98.311 CHLAMYDIA TRACHOMATIS INFECTION IN MOTHER DURING FIRST TRIMESTER OF PREGNANCY: ICD-10-CM

## 2021-02-10 DIAGNOSIS — Z3A.23 23 WEEKS GESTATION OF PREGNANCY: ICD-10-CM

## 2021-02-10 DIAGNOSIS — B37.3 VAGINAL YEAST INFECTION: ICD-10-CM

## 2021-02-10 DIAGNOSIS — O28.3 INCREASED NUCHAL TRANSLUCENCY SPACE ON FETAL ULTRASOUND: Primary | ICD-10-CM

## 2021-02-10 DIAGNOSIS — A56.8 CHLAMYDIA TRACHOMATIS INFECTION IN MOTHER DURING FIRST TRIMESTER OF PREGNANCY: ICD-10-CM

## 2021-02-10 LAB
SL AMB  POCT GLUCOSE, UA: ABNORMAL
SL AMB LEUKOCYTE ESTERASE,UA: ABNORMAL
SL AMB POCT BLOOD,UA: ABNORMAL
SL AMB POCT KETONES,UA: ABNORMAL
SL AMB POCT SPECIFIC GRAVITY,UA: 1.02
SL AMB POCT URINE PROTEIN: ABNORMAL

## 2021-02-10 PROCEDURE — 81002 URINALYSIS NONAUTO W/O SCOPE: CPT | Performed by: NURSE PRACTITIONER

## 2021-02-10 PROCEDURE — 87591 N.GONORRHOEAE DNA AMP PROB: CPT | Performed by: NURSE PRACTITIONER

## 2021-02-10 PROCEDURE — 87491 CHLMYD TRACH DNA AMP PROBE: CPT | Performed by: NURSE PRACTITIONER

## 2021-02-10 PROCEDURE — 99213 OFFICE O/P EST LOW 20 MIN: CPT | Performed by: NURSE PRACTITIONER

## 2021-02-10 NOTE — PROGRESS NOTES
Patient is primarily Serbian-speaking E  Leward Schirmer at bedside to assist translation    Denies loss of fluid, vaginal bleeding and abdominal pain  Confirms frequent fetal movement  Tolerating prenatal vitamin well  Patient complains of vaginal dryness, itching and irritation  States it is very similar to an infection she had early urine pregnancy  On review of chart was positive for chlamydia  Patient states both her and her partner have been treated however this is the same feeling  Will send chlamydia gonorrhea at today's visit  PE:  Labia minora with mild erythema, thick white vaginal discharge noted on exam   Center ultrasound reviewed-21-variable presentation, normal appearing fetal growth, posterior placenta, no placenta previa, KEL-WNL and EFW 13 oz  Patient is due for follow-up in 8 weeks for growth and to complete anatomy  Patient has this scheduled  BP: 103/69 weight: +13 lb  Urine:  Negative glucose/negative protein  Plan:  1  Continue prenatal vitamins daily  2  Vaginal dryness/irritation and itching     - Rx miconazole 1 applicator full nightly for 7 nights, sent electronically to pharmacy     - chlamydia gonorrhea sent  3  28 week labs provided  Blood type is B positive does not need RhoGAM   Reviewed with patient to have labs drawn 1-2 days prior to next appointment  4  Twenty-eight week folder provided and reviewed  Encouraged to return birth plan at next visit  5   center follow-up scheduled for 3/17/21  6  Common discomforts of pregnancy and precautions including  labor reviewed  Signs and symptoms to report reviewed  Written information provided about COVID-19    RTO 2 weeks if vaginal dryness/irritation continues and 4 weeks if symptoms resolve

## 2021-02-12 LAB
C TRACH DNA SPEC QL NAA+PROBE: NEGATIVE
N GONORRHOEA DNA SPEC QL NAA+PROBE: NEGATIVE

## 2021-03-12 ENCOUNTER — APPOINTMENT (OUTPATIENT)
Dept: LAB | Facility: HOSPITAL | Age: 26
End: 2021-03-12

## 2021-03-12 DIAGNOSIS — Z3A.23 23 WEEKS GESTATION OF PREGNANCY: ICD-10-CM

## 2021-03-12 LAB
BASOPHILS # BLD MANUAL: 0 THOUSAND/UL (ref 0–0.1)
BASOPHILS NFR MAR MANUAL: 0 % (ref 0–1)
EOSINOPHIL # BLD MANUAL: 0.21 THOUSAND/UL (ref 0–0.4)
EOSINOPHIL NFR BLD MANUAL: 2 % (ref 0–6)
ERYTHROCYTE [DISTWIDTH] IN BLOOD BY AUTOMATED COUNT: 13.6 % (ref 11.6–15.1)
GLUCOSE 1H P 50 G GLC PO SERPL-MCNC: 121 MG/DL
HCT VFR BLD AUTO: 32 % (ref 34.8–46.1)
HGB BLD-MCNC: 10.4 G/DL (ref 11.5–15.4)
LYMPHOCYTES # BLD AUTO: 0.82 THOUSAND/UL (ref 0.6–4.47)
LYMPHOCYTES # BLD AUTO: 8 % (ref 14–44)
MCH RBC QN AUTO: 29.6 PG (ref 26.8–34.3)
MCHC RBC AUTO-ENTMCNC: 32.5 G/DL (ref 31.4–37.4)
MCV RBC AUTO: 91 FL (ref 82–98)
METAMYELOCYTES NFR BLD MANUAL: 1 % (ref 0–1)
MONOCYTES # BLD AUTO: 0.51 THOUSAND/UL (ref 0–1.22)
MONOCYTES NFR BLD: 5 % (ref 4–12)
MYELOCYTES NFR BLD MANUAL: 1 % (ref 0–1)
NEUTROPHILS # BLD MANUAL: 8.52 THOUSAND/UL (ref 1.85–7.62)
NEUTS BAND NFR BLD MANUAL: 1 % (ref 0–8)
NEUTS SEG NFR BLD AUTO: 82 % (ref 43–75)
NRBC BLD AUTO-RTO: 0 /100 WBCS
PLATELET # BLD AUTO: 194 THOUSANDS/UL (ref 149–390)
PLATELET BLD QL SMEAR: ADEQUATE
PLATELET CLUMP BLD QL SMEAR: PRESENT
PMV BLD AUTO: 12.4 FL (ref 8.9–12.7)
RBC # BLD AUTO: 3.51 MILLION/UL (ref 3.81–5.12)
RBC MORPH BLD: NORMAL
RPR SER QL: NORMAL
TOTAL CELLS COUNTED SPEC: 100
WBC # BLD AUTO: 10.26 THOUSAND/UL (ref 4.31–10.16)

## 2021-03-12 PROCEDURE — 85007 BL SMEAR W/DIFF WBC COUNT: CPT

## 2021-03-12 PROCEDURE — 85027 COMPLETE CBC AUTOMATED: CPT

## 2021-03-12 PROCEDURE — 82950 GLUCOSE TEST: CPT

## 2021-03-12 PROCEDURE — 36415 COLL VENOUS BLD VENIPUNCTURE: CPT

## 2021-03-12 PROCEDURE — 86592 SYPHILIS TEST NON-TREP QUAL: CPT

## 2021-03-15 DIAGNOSIS — O99.012 ANEMIA DURING PREGNANCY IN SECOND TRIMESTER: Primary | ICD-10-CM

## 2021-03-15 RX ORDER — FERROUS SULFATE TAB EC 324 MG (65 MG FE EQUIVALENT) 324 (65 FE) MG
324 TABLET DELAYED RESPONSE ORAL EVERY OTHER DAY
Qty: 90 TABLET | Refills: 1 | Status: SHIPPED | OUTPATIENT
Start: 2021-03-15

## 2021-03-16 NOTE — PATIENT INSTRUCTIONS
COVID-19 y el embarazo   CUIDADO AMBULATORIO:   Lo que usted necesita saber sobre la COVID-19 y el embarazo: La enfermedad por coronavirus 2019 (COVID-19) es causada por el nuevo virus descubierto por Barber Leventhal a fines de 2019  Los coronavirus generalmente causan infecciones de las vías respiratorias superiores (nariz, garganta y pulmones), ailyn un resfriado  El nuevo virus también puede causar afecciones respiratorias inferiores graves, ailyn la neumonía o el síndrome de dificultad respiratoria aguda (SDRA)  El [place] suazo riesgo de enfermedad grave  La COVID-19 Michelle Riley conducir a un parto prematuro de suazo bebé  La mayoría de los bebés que se infectan con el nuevo virus no desarrollan efectos graves, pascual algunos [place]  Es importante que usted y suazo bebé estén seguros samantha el Ashley Mckay y [place]  Si ahmet que usted, suazo bebé o alguien en suazo casa pueden estar infectados: Evans lo siguiente para proteger a otras personas:  · Si se requiere atención de emergencia, avise al operador de la posible infección, o llame antes y avise al servicio de urgencias  · Llame a un médico para recibir instrucciones si los síntomas son leves  Cualquier persona que pueda estar infectada no  debe llegar sin llamar bobby  El médico deberá proteger a los miembros del personal y a otros pacientes  · La persona que puede estar infectada debe usar un tapabocas mientras reciben [employer]  [place] ayudará a reducir el riesgo de infectar a otras personas  Nadie que sea fawad de 2 años, que tenga problemas respiratorios o que no pueda quitárselo debe usar un tapabocas  El médico puede darle instrucciones para cualquier persona que no pueda usar un tapabocas  Llame al Floyde Hemming local de emergencias (911 en los [place]) o pídale a alguien que lo lleve al departamento de emergencias si:  · Usted tiene dificultad para respirar o falta de aliento mientras descansa      · Usted siente presión o dolor en el pecho que dura más de 5 minutos  · Usted tiene confusión o es difícil despertarlo  · Coretta labios o charleen están azules  · Usted tiene fiebre de 104 ºF (40 °C) o más  Llame a suazo médico si:  · Tiene signos o síntomas de COVID-19  Intente llamar dentro de las 24 horas siguientes a cuando empiece a sentirse mal     · No  tiene síntomas de COVID-19 pascual tuvo contacto físico cercano dentro de los 14 días con alguien que cathy positivo  · Usted tiene preguntas o inquietudes acerca de suazo condición o cuidado  Cómo se propaga el coronavirus 2019: El virus se propaga rápida y fácilmente  Puede infectarse si está en contacto con virginia gran cantidad del virus, incluso samantha poco tiempo  También puede infectarse por estar cerca de virginia pequeña cantidad del virus samantha mucho tiempo  A continuación se indican las formas en que se ahmet que se propaga el virus, pascual es posible que surja más información:  · Las gotitas son la forma más común de propagación de todos los coronavirus  El virus puede viajar en gotitas que se brittny cuando virginia persona habla, tose o estornuda  Cualquiera que respire las gotitas o que las gotitas se le metan en los ojos puede infectarse con el virus  · El contacto de persona a persona puede propagar el virus  Por ejemplo, virginia persona con el virus en coretta everardo puede propagarlo al darle la mano a alguien  · El virus puede permanecer en objetos y superficies  Virginia persona puede contraer el virus en coretta everardo al tocar el objeto o la superficie  La infección se produce si la persona se toca los ojos o la boca sin antes lavarse las everardo  Aún no se sabe cuánto tiempo puede permanecer el virus en un objeto o superficie  Por eso es importante limpiar todas las superficies que se usan regularmente  · Un animal infectado puede ser Guero Ragland de infectar a virginia persona que lo toque  [place] puede ocurrir en [facility] vivos o en virginia di      Protéjase y proteja a suazo bebé mientras esté Omar Reilly: Si tiene COVID-19 samantha el David Garcia, los Jordne vigilarán a usted y a suazo bebé de cerca  Colabore con suazo médico u obstetra  Si no tiene ninguno The   [employer], los expertos recomiendan que se ponga en contacto con un centro de ellis o departamento de ellis de la comunidad local  La mejor manera de prevenir la infección es evitar a cualquiera que esté infectado, pascual esto puede ser difícil de lograr  [place] persona infectada puede propagar el virus antes de que se desarrollen los signos o síntomas, o incluso si los signos o síntomas nunca se desarrollan  Lo siguiente puede ayudar a mantenerlos a usted y a suazo bebé a edmund:     · [address]  Utilice agua y [place]  Frótese las everardo enjabonadas, [facility] dedos  Lávese el frente y el dorso de cada [place], y [place] dedos  Use los dedos de virginia mano para restregar debajo de las uñas de la [place]  Lávese samantha al menos 20 segundos  Enjuague con agua corriente caliente samantha varios segundos  Séquese las everardo con virginia toalla limpia o virginia toalla de papel  Puede usar un desinfectante para everardo que contenga alcohol, si no hay agua y jabón disponibles  Si tiene que Geovanna lucas everardo antes de salir de suazo casa y cuando regrese a suazo casa  Lávese las everardo después de guardar los artículos  Tenga cuidado con lo que toca [address] [place]  · Protéjase de los estornudos y la tos  Gire la tamanna alejándose y cúbrase la boca y la nariz si está cerca de alguien que está estornudando o tosiendo  [place] ayuda a protegerse de las gotitas de la persona  · Acostúmbrese a no tocarse la charleen  Si tiene el virus [employer], puede transferirlo a los ojos, la nariz o la boca e infectarse  · [address] distanciamiento social a nivel local, nacional y [place]  El distanciamiento social significa que las personas se mantienen físicamente separadas, lo suficiente ailyn para que el virus no se propague de Natalie Muhammad persona a otra   Si tiene que lance [employer] multitudes y las grandes reuniones  Por ejemplo, conciertos, fiestas, eventos deportivos, servicios religiosos y conferencias  Se pueden formar multitudes en [address], los parques y las atracciones turísticas  No entre en restaurantes o bares abarrotados  Estará lo suficientemente cerca de [employer] ailyn para poder infectarse  · Use un tapabocas cuando esté cerca de otros  Los tapabocas ayudan evitar que el virus se propague a otras personas en las gotitas  Igualmente debe mantenerse al menos 6 pies (2 metros) de los demás mientras [place] el tapabocas  Asegúrese de que puede respirar fácilmente a través del tapabocas  No utilice tapabocas que tengan válvulas de respiración o respiraderos  El virus puede salir por la válvula o el respiradero y contagiar a otros  No se quite el tapabocas para hablar, toser o estornudar  No coloque un protector facial ni un tapabocas a un recién nacido  [place] aumenta el riesgo de síndrome de muerte súbita del lactante (SMSL)  · Manténgase al menos a 6 pies (2 metros) de distancia de cualquiera que no viva en suazo casa  Mantenga esta distancia cada vez que salga de suazo casa y esté cerca de otra persona  No le dé la mano, abrace o bese a virginia persona ailyn saludo  Párese o camine lo más lejos posible de los demás, especialmente alrededor de cualquiera que esté estornudando o tosiendo  Si tiene [employer] transporte público (ailyn el autobús, el metro o un viaje compartido), intente sentarse o mantenerse alejado de los demás  No vaya a la casa de [place] persona, a menos que sea necesario  No vaya de visita, aunque se sienta solo, o la persona lo esté  Vaya solo si necesita ayudarla  · Manténgase a edmund si debe que salir a trabajar  Puede que tenga un trabajo que solo se hace fuera de suazo casa y que se considera esencial  Mantenga la distancia física entre usted y los demás trabajadores tanto ailyn sea posible  Siga las reglas de suazo empleador para que todos estén a edmund      · Limpie y desinfecte las superficies y los objetos de alto contacto de suazo casa a menudo  Use virginia solución o toallitas desinfectantes  Puede hacer virginia solución diluyendo 4 cucharaditas de lejía en 1 cuarto de galón (4 tazas) de agua  Limpie y desinfecte aunque piense que nadie que viva o haya entrado en suazo casa esté infectado con el virus  [address] y los objetos de la habitación donde dormirá suazo bebé, especialmente abiodun antes de cristian a roger  [address] y desinfectar  Tenga cuidado con los productos de limpieza  Jena las etiquetas para asegurarse de que son Cathryn Coma para usarlas samantha el embarazo  Javid las ventanas para asegurarse de que tiene virginia buena ventilación  Lo que puede hacer para tener un embarazo saludable samantha el brote de COVID-19:  · Asista a todas las citas de control prenatal y posnatal  Es posible que pueda tener ciertas citas de control prenatal sin tener que ir al consultorio del médico  Algunos médicos ofrecen citas por teléfono, video u otros tipos de citas  También puede obtener recetas para varios meses de virginia vez  [place] ayudará a reducir el número de viajes que tiene que hacer a la farmacia para reabastecerse  Si necesita ir al [employer], tome precauciones  Use un tapabocas antes de entrar al [employer]  No se pare ni se siente a menos de 6 pies (2 metros) de nadie en la sarah de espera, si es posible  No se pare ni se siente cerca de nadie que no use un tapabocas  · [address] [facility] [place] [place] [address] West vacunas recomendadas  No hay ninguna vacuna disponible para el nuevo coronavirus  Se recomiendan otras vacunas samantha el embarazo  Debe recibir la vacuna contra la influenza (gripe) tan pronto ailyn se lo recomienden, generalmente en septiembre u octubre  Se recomienda virginia vacuna Tdap (tétanos, difteria, tos [place]) en [facility]  Si es posible, vacúnese cuando tenga de 27 a 36 semanas de embarazo   Suazo médico puede indicarle si necesita [employer], y cuándo aplicárselas  · [address] [place]  Las vitaminas prenatales deben contener ácido Lena Palomo unos 283 microgramos (mcg) de ácido fólico cada día samantha el embarazo  El ácido fólico ayuda a formar el cerebro y la médula sampson del bebé en el comienzo del [facility]  · Consuma alimentos saludables y variados  Los alimentos saludables son importantes, incluso si yovany virginia vitamina prenatal  Los alimentos saludables contienen nutrientes que ayudan a mantener el sistema inmunitario adam  Los alimentos saludables incluyen, por ejemplo, verduras, frutas, panes y cereales integrales, raimundo Roberto y de aves, y pescado, productos lácteos bajos en grasas y frijoles cocidos  No consuma alimentos o bebidas crudas, poco cocinadas o no pasteurizadas  Los alimentos sin pasteurizar son aquéllos que no pasaron por el proceso de calentamiento (pasteurización) que destruye la bacteria  Suazo médico o un dietista pueden ayudarla a crear un plan de comidas saludables  · Consulte con suazo médico acerca de hacer ejercicio  El ejercicio moderado ayuda a fortalecer suazo sistema inmunitario  Suazo médico puede ayudarla a planificar un programa de ejercicios que sea seguro para usted samantha el [facility]  Es posible que necesite hacer ejercicio en casa si no puede hacer ejercicio al [facility], ailyn por ejemplo, caminar en un parque  Si quiere hacer yoga para embarazadas u otras actividades en regulo, tenga cuidado  Manténgase al menos a 6 pies (2 metros) de los demás alumnos y del instructor  Lávese las everardo antes de dejar el lugar  Siga las instrucciones del lugar para prevenir infecciones  · Intente reducir suazo estrés  Puede que se sienta más estresado que de costumbre debido al brote de COVID-19  También es posible que sienta estrés por no poder compartir suazo embarazo con otras personas   Por ejemplo, es posible que no pueda tener a alguien que tobi Carlson las visitas prenatales o las ecografías  Hable con coretta médicos [employer] formas de controlar el estrés samantha Yuri  Escoja 1 o 2 veces al día para danay las noticias  La marisela observación de las noticias [employer] COVID-19 puede aumentar coretta niveles de estrés  Planifique un cronograma de sueño para acostarse y levantarse a la misma hora todos los GRASSE  · No fume cigarrillos, no tome alcohol ni use drogas  La nicotina y otros químicos de los cigarrillos y cigarros pueden dañar al bebé y suazo ellis  El alcohol puede aumentar suazo riesgo de aborto espontáneo  Suazo bebé también puede nacer demasiado pequeño o tener otros problemas de [place]  Ciertas drogas pueden transmitirse a suazo bebé antes de que nazca  Algunas pueden pasar a través de la [employer]  Es mejor dejar el cigarrillo, el alcohol y las drogas antes de Trygve Clipper y no volver a empezar después de que nazca el bebé  Pida información a suazo médico si usted actualmente [place] estos productos y necesita ayuda para dejarlos  Chencho Goldanjel a suazo recién nacido samantha el parto y [place] esté en el hospital: No se sabe con certeza si un bebé por nacer puede infectarse por el virus que causa la COVID-19  Algunos recién nacidos crain dado positivo para el virus  Los recién nacidos pueden haberse infectado antes, samantha o después del nacimiento  El mayor riesgo es que un recién nacido esté en contacto cercano con virginia persona infectada  · Pregunte sobre la separación temporal si usted tiene COVID-19  La separación temporal significa que trasladan a suazo recién nacido a virginia habitación diferente a la suya  Podrá sudheer la decisión si Rushie Diss esto  La separación ayudará a reducir el riesgo de infección de suazo recién nacido  Igualmente podrá amamantar a suazo recién nacido  Worthy Orn necesite extraer la leche de coretta pechos  Karolina Hernandez que no tenga COVID-19 alimentará a suazo recién nacido con la [employer] extraída  En cambio, puede elegir [employer] traigan a suazo bebé cuando quiera amamantarlo   [employer] precauciones para mantener la seguridad de jolley bebé  Lávese las everardo y la piel alrededor de los pezones antes de sostener al bebé  Use un tapabocas mientras amamanta  · Graham Laughlin si tiene COVID-19 y no decide optar por la separación temporal  Los médicos mantendrán a jolley recién nacido por lo menos a 6 pies (2 metros) alejado de usted tanto ailyn sea posible  Es posible que coloquen a jolley recién nacido en virginia incubadora  La incubadora ayudará a proteger al recién nacido contra la infección  Siempre Valente-Lou y póngase un tapabocas cuando sostenga, toque o tenga contacto cercano con jolley recién nacido  · Jairo Payne visitas  Es posible que la instalación no permita visitantes a los recién [employer]  Si se permiten las visitas, puede que tenga que limitar el número de visitantes a la vez  No permita que nadie que se sepa o sospeche que tenga COVID-19 lo visite  Incluso sin signos o síntomas, la persona puede infectar a jolley recién nacido o a otras personas en la habitación  Todos los visitantes deben lavarse las everardo y usar tapabocas limpios antes de entrar en jolley habitación  El tapabocas debe permanecer colocado samantha toda la visita  No deje que nadie se quite el tapabocas para hacer muecas a jolley bebé, hablar, estornudar o toser  No permita que nadie bese al bebé  Opal Thomason a jolley recién nacido en casa:  · Puede elegir continuar la separación temporal si usted tiene COVID-19  Puede hacerlo si un adulto que no tiene COVID-19 puede cuidar de jolley recién nacido  Jolley médico puede darle instrucciones sobre cómo hacer esto de manera goodwin en casa  Solo tenga un contacto cercano con jolley recién nacido cuando sea necesario  Recuerde lavarse las everardo y ponerse bobby un tapabocas limpio  Sebastian vez deba seguir extrayendo la [facility]  Un adulto susan puede alimentar a jolley recién nacido con la [facility] extraída  En cambio, puede elegir BooneRanku traigan a jolley bebé cuando quiera amamantarlo  [place] precauciones para mantener la seguridad de suazo bebé  Lávese las everardo y la piel alrededor de los pezones antes de sostener al bebé  También tendrá que usar un tapabocas [employer]  · Use tapabocas de manera goodwin  Todos los que tienen COVID-19 deben usar un tapabocas limpio mientras estén a menos de 6 pies (2 metros) de suazo recién nacido  [place] incluye a otros niños en suazo casa que tienen 2 años o [place]  No coloque un tapabocas ni un protector facial de plástico a suazo recién nacido  Cualquier tipo de [place] el riesgo de suazo recién nacido de sufrir síndrome de muerte súbita del lactante (SMSL)  No utilice tapabocas en niños menores de 2 años ni en personas que tengan problemas respiratorios o no puedan quitárselo  · Tenga cuidado con las visitas  Continúe con las precauciones que usó en el hospital  No permita que nadie que se sepa o sospeche que tenga COVID-19 visite a suazo recién nacido  Evans que los visitantes usen un tapabocas limpio antes de entrar a suazo casa  Evans que se laven las everardo en cuanto entren  El tapabocas debe permanecer colocado samantha toda la visita  · Asista a todas las citas de [address]  Es posible que pueda tener algunas citas por teléfono o por video  Otras citas tendrán que ser en persona, ailyn para las vacunas  Las vacunas se administran normalmente a los bebés a ciertas edades  Hasta que la COVID-19 esté bajo control, el médico de suazo recién Curtis Grman dará un calendario de vacunas  Es importante que suazo recién nacido reciba todas las vacunas recomendadas  Lo que necesita saber acerca de la lactancia: La lactancia materna samantha los primeros 6 meses disminuye el riesgo de que el bebé sufra infecciones respiratorias (pulmones), alergias, asma y problemas estomacales  La [employer] también sirve para que suazo bebé desarrolle un sistema inmunitario adam  La leche materna se considera goodwin, incluso si usted tiene COVID-19   Los expertos creen que el virus que causa la COVID-19 no se propaga en la [employer]  Siga las siguientes pautas para proteger a jolley bebé:  · [employer] las everardo antes de cada sesión de lactancia o de extracción de [employer]  Aunque no tenga COVID-19, puede transferir el virus de coretta everardo a jolley bebé o al extractor  Use agua y jabón para lavarse las everardo siempre que sea posible  Puede usar un desinfectante para everardo que contenga alcohol, si no hay agua y jabón disponibles  · Limpie y desinfecte jolley extractor de [place] después de [place] \A Chronology of [place] [facility]als\""e 32  Siga las instrucciones del fabricante para limpiar y desinfectar el extractor  Es importante no utilizarlo hasta que esté limpio y desinfectado  · Si tiene COVID-19:     ? Use un tapabocas mientras amamanta o se extrae la leche  [place] ayudará a evitar que transmita el virus a través de las gotitas cuando [place], [place], estornude o se ría  El virus puede permanecer en las superficies ailyn las del extractor samantha horas o días  ? Si es posible, pídale a alguien que no esté infectado que alimente a jolley bebé con biberón  Evans que la persona se lave las everadro con agua y jabón antes de cada alimentación  La persona puede alimentar a jolley recién nacido con Rochele Creed o con fórmula  Acuda a coretta consultas de control con jolley médico u obstetra según le indicaron: Anote coretta preguntas para que se acuerde de hacerlas samantha coretta visitas  Para más información:  · Centers for Disease Control and Prevention  [address] , [address]  Phone: [phone]  Web Address: Certain     © 4614 [facility] 3060 Information is for End User's use only and may not be sold, redistributed or otherwise used for commercial purposes  All illustrations and images included in CareNotes® are the copyrighted property of A D A M , Inc  or [address] [place] [address] es sólo para uso en educación  Jolley intención no es darle un consejo médico sobre enfermedades o tratamientos   Colsulte con Joslyn LIRIANO' , enfermera o farmacéutico antes de seguir cualquier régimen médico para saber si es seguro y efectivo para usted  Conteo de patadas en el embarazo   CUIDADO AMBULATORIO:   El conteo de patadas mide cuánto se está moviendo suazo bebé en el útero  [place] patada de suazo bebé podría sentirse ailyn virginia torcedura, virginia vuelta, un crujido, un meneo o un golpe  Es común sentir a tu bebé patear a las 32 a 29 semanas de [place]  Es posible que sienta al bebé patear ya a las 20 semanas de [place]  Puede que desee empezar a contar a las 28 semanas  Comuníquese inmediatamente con suazo médico si:  · Usted siente un cambio en el número de patadas o movimientos de suazo bebé  · Siente menos de 10 patadas en 2 horas  · Usted tiene preguntas o inquietudes acerca de los movimientos de suazo bebé  Por qué realizar el conteo de patadas: Los movimientos de suazo bebé podrían proporcionar información de la ellis de suazo bebé  Es posible que si hay problemas, suazo bebé se mueva menos o nada en lo absoluto  El bebé podría moverse menos si no recibe suficiente oxígeno o alimento de la placenta  No fume mientras está embarazada  Fumar disminuye la cantidad de oxígeno que llega a suazo bebé  Hable con suazo médico si necesita ayuda para dejar de fumar  Los problemas que se encuentran en virginia etapa más temprana son más fáciles de tratar  Cuándo realizar el conteo de patadas:  · Cuente las patadas en el mismo horario todos los GRASSE  · Realice el conteo de las patadas cuando suazo bebé esté despierto y [place]  Suazo bebé podría estar más activo en la tarde  Cómo realizar el conteo de patadas: Revise que suazo bebé esté despierto antes de realizar el conteo de patadas  Usted puede despertar a suazo bebé empujando suazo estómago suavemente, caminando o tomando algo frío  Suazo médico podría indicarle diferentes maneras de realizar el conteo   Es posible que le indique que damaso lo siguiente:  · Use virginia gráfica o un reloj para mantener un registro de la hora en que comienza y termina de contar  · Siéntese en virginia silla o acuéstese en suazo costado derecho  · Coloque coretta everardo en la parte más osmany de suazo BJURHOLM  · Cuente hasta que llegue a las 10 patadas  Escriba cuánto tiempo le lleva contar las 10 patadas  · Podría sudheer de 30 minutos a 2 horas para contar 10 patadas  No debería de sudheer más de 2 horas para contar 10 patadas  Acuda a coretta consultas de control con suazo médico según le indicaron  Anote coretta preguntas para que se acuerde de hacerlas samantha coretta visitas  © [place] Information is for End User's use only and may not be sold, redistributed or otherwise used for commercial purposes  All illustrations and images included in CareNotes® are the copyrighted property of [employer]  or [address] [place] [address] es sólo para uso en educación  Suazo intención no es darle un consejo médico sobre enfermedades o tratamientos  Colsulte con suazo Anil Mad farmacéutico antes de seguir cualquier régimen médico para saber si es seguro y efectivo para usted  El embarazo de la semana 27 a la 30   LO QUE NECESITA SABER:   ¿Qué cambios están ocurriendo en mi cuerpo? Usted podría notar síntomas nuevos ailyn falta de [place] o inflamación de coretta tobillos y pies  Es posible que también tenga dificultad para dormir o contracciones  ¿Cómo me holly cuidar en esta etapa de mi embarazo? · Consuma alimentos saludables y variados  Alimentos saludables incluyen frutas, verduras, panes de koffi integral, alimentos lácteos bajos en grasa, frijoles, raimundo magras y pescado  [place] líquidos ailyn se le haya indicado  Pregunte cuánto líquido debe sudheer cada día y cuáles líquidos son los más adecuados para usted  Limite el consumo de cafeína a menos de [place] [address]  Limite el consumo de pescado a 2 porciones cada semana  Escoja pescado con concentraciones bajas de zahra ailyn atún al natural enlatado, camarón, salmón, bacalao o tilapia   No coma pescado con concentraciones altas de zahra ailyn pez sarah, caballa gigante, pargo rayado y tiburón  · Controle la acidez comiendo 4 o 5 comidas pequeñas cada día en vez de comidas grandes  Evite los alimentos picantes  · Controle la inflamación al [place] y poner los pies en alto o elevados sobre [place]  · [address] [place]  Suazo necesidad de ciertas vitaminas y [facility], ailyn el ácido fólico, aumenta samantha el [facility]  Las vitaminas prenatales proporcionan algunas de las vitaminas y minerales adicionales que usted necesita  Las vitaminas prenatales también podrían ayudar a disminuir el riesgo de ciertos defectos de nacimiento  · Consulte con suazo médico acerca de hacer ejercicio  El ejercicio moderado puede ayudarla a mantenerse en forma  Suazo médico la ayudará a planear un programa de ejercicios que sea seguro para usted samantha suazo [facility]  · No fume  Fumar aumenta el riesgo de aborto espontáneo y otros problemas de ellis samantha suazo [facility]  Fumar puede causar que suazo bebé nazca antes de tiempo o que pese menos al nacer  Solicite información a suazo médico si usted necesita ayuda para dejar de fumar  · No consuma alcohol  El alcohol pasa de suazo cuerpo al bebé a través de la placenta  Puede afectar el desarrollo del cerebro de suazo bebé y provocar el síndrome de alcoholismo fetal (SAF)  El SAF es un regulo de condiciones que causan problemas [place], de comportamiento y de crecimiento  · Consulte con suazo médico antes de sudheer cualquier medicamento  Muchos medicamentos pueden perjudicar a suazo bebé si usted los yovany [address] [place] [address]  No tome ningún medicamento, vitaminas, hierbas o suplementos sin bobby consultar con suazo Robbert Soda  use drogas ilegales o de la felipe (ailyn marihuana o cocaína) mientras está embarazada  ¿Cuáles son Lattie Blew consejos de seguridad samantha el embarazo? · Evite jacuzzis y saunas   No use un jacuzzi o un sauna mientras usted está embarazada, especialmente samantha el primer trimestre  Los [facility] y los saunas aumentan la temperatura de suazo bebé y el riesgo de defectos de nacimiento  · Evite la toxoplasmosis  [place] es [place] infección causada por comer carne cruda o estar cerca del excremento de un aide infectado  [place] puede causar malformaciones congénitas, aborto espontáneo y Javier Schein  Lávese las everardo después de tocar carne cruda  Asegúrese de que la carne esté ana cocida antes de comerla  Evite los huevos crudos y la Rachael Eans  Use guantes o pida que alguien la ayude a limpiar la caja de arena del aide mientras usted [place]  ¿Qué cambios están ocurriendo con mi bebé? Para las 30 semanas, suazo bebé podría pesar más de 3 libras  Suazo bebé podría medir alrededor de 11 pulgadas de antoni desde la punta de la tamanna hasta la rabadilla (parte inferior del bebé)  Ahora suazo bebé puede abrir y cerrar coretta ojos  Las patadas y movimientos de suazo bebé son más brandan en gabriela momento  ¿Qué necesito saber acerca del cuidado prenatal? Suazo médico le revisará suazo presión arterial y [place]  Es posible que también necesite lo siguiente:  · Los análisis de maricarmen podrían realizarse para revisar signos de anemia o el tipo de [place]  · Un examen de orina también podría realizarse para revisarle el azúcar y la proteína  Estas son señales de diabetes gestacional o de infección  La proteína en suazo orina también podría ser virginia señal de preeclampsia  La preeclampsia es virginia condición que puede desarrollarse samantha la semana 21 o después en suazo embarazo  Esta provoca presión arterial jameson y [employer] con coretta riñones y [place]  · [place] vacuna contra difteria, tétanos y tos ferina y vacuna contra la gripe podría ser recomendado por suazo médico     · La detección de diabetes gestacional se realizará usando virginia prueba oral de tolerancia a la glucosa   [place] prueba oral de tolerancia a la glucosa comienza con virginia revisión de los niveles de azúcar en la maricarmen después de que usted no haya comido por 8 horas  Después se le da virginia bebida de glucosa  Le revisan el nivel de azúcar en la marciarmen después de 1 hora, 2 horas y algunas veces 3 horas  Los médicos analizarán si el nivel de azúcar en la maricarmen aumenta después del primer análisis  · La altura uterina es virginia medición del útero para controlar el desarrollo de suazo bebé  [employer] por lo general es igual al número de 11 Maira Li que usted tiene de embarazo  Suazo médico también podría revisar la posición de suazo bebé  · El ritmo cardíaco de suazo bebé será revisado  ¿Cuándo holly buscar atención inmediata? · Usted presenta un adam dolor de tamanna que no desaparece  · Usted tiene cambios en la visión nuevos o en aumento, ailyn visión borrosa o con manchas  · Usted tiene inflamación nueva o creciente en suazo charleen o everardo  · Usted tiene manchado o sangrado vaginal     · Usted rompe cyril o siente un chorro o gotas de agua tibia que le está bajando por suazo vagina  ¿Cuándo holly comunicarme con mi médico?  · Usted tiene más de 5 contracciones en 1 hora  · Usted nota algún cambio en los movimientos de suazo bebé  · Usted tiene calambres, presión o tensión abdominal     · Usted tiene un cambio en la secreción vaginal     · Usted tiene escalofríos o fiebre  · Usted tiene comezón, ardor o dolor vaginal     · Usted tiene virginia secreción vaginal amarillenta, verdosa, serge o de [employer]  · Usted tiene dolor o ardor al Liane Lahaina, orina menos de lo habitual o tiene [place] rosada o sanguinolenta  · Usted tiene preguntas o inquietudes acerca de suazo condición o cuidado  ACUERDOS SOBRE SUAZO CUIDADO:   Usted tiene el derecho de ayudar a planear suazo cuidado  Aprenda todo lo que pueda sobre suazo condición y ailyn darle tratamiento  Discuta coretta opciones de tratamiento con coretta médicos para decidir el cuidado que usted desea recibir   Usted siempre tiene el derecho de rechazar el tratamiento  Esta información es sólo para uso en educación  Suazo intención no es darle un consejo médico sobre enfermedades o tratamientos  Colsulte con suazo Betty Lauri farmacéutico antes de seguir cualquier régimen médico para saber si es seguro y efectivo para usted  © Copyright [address] Information is for End User's use only and may not be sold, redistributed or otherwise used for commercial purposes   All illustrations and images included in CareNotes® are the copyrighted property of A D A M , Inc  or [address] [place] [address]

## 2021-03-17 ENCOUNTER — ULTRASOUND (OUTPATIENT)
Dept: PERINATAL CARE | Facility: CLINIC | Age: 26
End: 2021-03-17

## 2021-03-17 ENCOUNTER — ROUTINE PRENATAL (OUTPATIENT)
Dept: OBGYN CLINIC | Facility: CLINIC | Age: 26
End: 2021-03-17

## 2021-03-17 VITALS
SYSTOLIC BLOOD PRESSURE: 115 MMHG | WEIGHT: 189.4 LBS | HEART RATE: 90 BPM | BODY MASS INDEX: 34.85 KG/M2 | DIASTOLIC BLOOD PRESSURE: 58 MMHG | HEIGHT: 62 IN

## 2021-03-17 VITALS
SYSTOLIC BLOOD PRESSURE: 113 MMHG | BODY MASS INDEX: 33.86 KG/M2 | HEART RATE: 96 BPM | HEIGHT: 62 IN | DIASTOLIC BLOOD PRESSURE: 70 MMHG | WEIGHT: 184 LBS

## 2021-03-17 DIAGNOSIS — Z3A.28 28 WEEKS GESTATION OF PREGNANCY: ICD-10-CM

## 2021-03-17 DIAGNOSIS — O99.213 OBESITY AFFECTING PREGNANCY IN THIRD TRIMESTER: ICD-10-CM

## 2021-03-17 DIAGNOSIS — O28.3 INCREASED NUCHAL TRANSLUCENCY SPACE ON FETAL ULTRASOUND: ICD-10-CM

## 2021-03-17 DIAGNOSIS — IMO0002 EVALUATE ANATOMY NOT SEEN ON PRIOR SONOGRAM: ICD-10-CM

## 2021-03-17 DIAGNOSIS — O99.012 ANEMIA DURING PREGNANCY IN SECOND TRIMESTER: ICD-10-CM

## 2021-03-17 DIAGNOSIS — O34.219 MATERNAL CARE FOR SCAR FROM PREVIOUS CESAREAN DELIVERY, UNSPECIFIED SCAR TYPE: Primary | ICD-10-CM

## 2021-03-17 DIAGNOSIS — Z36.89 ENCOUNTER FOR ULTRASOUND TO ASSESS FETAL GROWTH: ICD-10-CM

## 2021-03-17 DIAGNOSIS — Z3A.28 28 WEEKS GESTATION OF PREGNANCY: Primary | ICD-10-CM

## 2021-03-17 LAB
SL AMB  POCT GLUCOSE, UA: NEGATIVE
SL AMB LEUKOCYTE ESTERASE,UA: NEGATIVE
SL AMB POCT BILIRUBIN,UA: NEGATIVE
SL AMB POCT BLOOD,UA: NORMAL
SL AMB POCT KETONES,UA: NEGATIVE
SL AMB POCT NITRITE,UA: NEGATIVE
SL AMB POCT PH,UA: 6
SL AMB POCT SPECIFIC GRAVITY,UA: 1.01
SL AMB POCT URINE PROTEIN: NEGATIVE
SL AMB POCT UROBILINOGEN: NEGATIVE

## 2021-03-17 PROCEDURE — 90471 IMMUNIZATION ADMIN: CPT

## 2021-03-17 PROCEDURE — 99213 OFFICE O/P EST LOW 20 MIN: CPT | Performed by: NURSE PRACTITIONER

## 2021-03-17 PROCEDURE — 76816 OB US FOLLOW-UP PER FETUS: CPT | Performed by: OBSTETRICS & GYNECOLOGY

## 2021-03-17 PROCEDURE — 90715 TDAP VACCINE 7 YRS/> IM: CPT

## 2021-03-17 PROCEDURE — 81002 URINALYSIS NONAUTO W/O SCOPE: CPT | Performed by: NURSE PRACTITIONER

## 2021-03-17 PROCEDURE — 99213 OFFICE O/P EST LOW 20 MIN: CPT | Performed by: OBSTETRICS & GYNECOLOGY

## 2021-03-17 NOTE — LETTER
March 17, 2021     Hattie Hutchinson, [address]    Patient: Nasim Goel   YOB: 1995   Date of Visit: 3/17/2021       Dear Dr Char Mclain: Thank you for referring Shania Ku to me for evaluation  Below are my notes for this consultation  If you have questions, please do not hesitate to call me  I look forward to following your patient along with you  Sincerely,        Farheen Jiménez MD        CC: No Recipients  Farheen Jiménez MD  3/17/2021  9:27 AM  Sign when Signing Visit  [address] [facility] [address]: Ms Agusto Villatoro was seen today at 28w0d for fetal growth and followup missed anatomy ultrasound  See ultrasound report under "OB Procedures" tab    Please don't hesitate to contact our office with any concerns or questions   -Farheen Jiménez MD

## 2021-03-17 NOTE — PROGRESS NOTES
[address] [facility] [address]: Ms Gilmer Mathis was seen today at 28w0d for fetal growth and followup missed anatomy ultrasound  See ultrasound report under "OB Procedures" tab    Please don't hesitate to contact our office with any concerns or questions   -Sarah Dobson MD

## 2021-03-17 NOTE — PROGRESS NOTES
Patient is primarily Panamanian-speaking SRINATH Pyle at bedside to assist translation  Denies loss of fluid, vaginal bleeding and abdominal pain  Confirms frequent fetal movement  Tolerating prenatal vitamin well  Has not started  Reviewed 28 week labs and diagnosis of anemia and pregnancy  Patient verbalizes understanding  Reviewed recommendation for Tdap vaccination, patient is agreeable to administration at today's visit  Denies other questions or concerns at today's visit  Patient plans include repeat  section both breast and formula feeding and pills for postpartum contraception  [address] ultrasound reviewed-3/17/21-vertex presentation, normal appearing fetal, posterior placenta, no placenta previa, KEL-WNL and EFW 1246g/ 2 lb 12 oz (59%)  Recommendation for follow-up at 34 weeks for growth  BP: 113/70  Weight: +28lb  Urine:  Negative glucose/negative protein  Plan:  1  Continue prenatal vitamin daily  2  Fetal kick counts reviewed, encouraged daily and written information provided  3  Anemia in pregnancy reviewed with patient to start every other day on an empty stomach with orange juice for best absorption  Encouraged increase iron in diet  Written information provided  4  Tdap vaccine today  5  Growth scan as scheduled-21  6  Common discomforts of pregnancy and precautions including  labor reviewed  Signs and symptoms report reviewed  Encouraged social distancing, good hand hygiene, avoiding crowds and masking  Written information provided about COVID-19    RTO 2 weeks f/u birth plan

## 2021-03-30 NOTE — PROGRESS NOTES
OB/GYN prenatal visit    : [id number]    S: 30 y o  [id number] 30w0d here for PN visit  She denies obstetric complaints, including pelvic pain, contractions, vaginal bleeding, loss of fluid, or decreased fetal movement  O:  Vitals:    21 1315   BP: 115/58   Pulse: (!) 106       Gen: no acute distress, nonlabored breathing  Fundal Height (cm): 30 cm  Fetal Heart Rate: 125    A/P:      IUP at 30w0d  Delivery consent form signed today  No obstetric complaints today  PNC growth scan scheduled at 34 weeks  TWG: + 32lb (recommended weight gain 11-20lb)  TDAP vaccine 3/17/21  Breastfeeding: breast and formula feeding  Birth plan: RLTCS   Discussed  labor precautions and fetal kick counts    Return to clinic in 2 weeks    History of classical C/S in [facility]  According to OB history, had 40w0d "classical"  section  Vertical skin incision visualized today, unsure if uterine incision was classic vs low transverse given term fetus  Upon further discussion, patient states that indication for 1st delivery was inadequate pelvis per OBGYN  She states that in [facility], male fetuses are delivered via vertical skin incision  Female fetuses are delivered via Pfannenstiel skin incision  Patient unsure of her uterine incision, does not recall her doctor discussing future pregnancy and mode of delivery with her  Patient had a repeat low-transverse  section at 40 weeks for her 2nd pregnancy  States she was not given the option to [facility]  I reached out to Dr Hugh Christensen (Maternal-Fetal Medicine) with regards to recommendations for delivery timing  With a prior history of classical  section, her repeat  should occur at or after 36 weeks given the increased risk of uterine rupture  With prior low-transverse C-sections, her repeat  should occur after 39 weeks       Desire for permanent sterilization  Patient previously desired oral contraceptive pills for postpartum contraception  Upon further discussion today, patient desires permanent sterilization at time of  delivery  I discussed short-acting contraception, long-acting reversible contraception, and irreversible permanent sterilization via bilateral tubal ligation or salpingectomy  Patient is sure that she has completed childbearing  She understands that after permanent sterilization, she will need to undergo in vitro fertilization if she desires subsequent pregnancy  She understands that if scar tissue or heavy bleeding is encountered during , we would forego salpingectomy as it is an elective procedure and bring her back in 6 weeks for postpartum sterilization  MA consent form signed today  Patient aware that she can change her mind at any point  Please continue to discuss with patient at subsequent visits      COVID 19 precautions were discussed with patient at length, reviewed symptoms, hygiene, social distancing, patient to call office  with questions/concerns    Rylie Malloy MD  3/31/2021  3:51 PM

## 2021-03-31 ENCOUNTER — ROUTINE PRENATAL (OUTPATIENT)
Dept: OBGYN CLINIC | Facility: CLINIC | Age: 26
End: 2021-03-31

## 2021-03-31 VITALS
WEIGHT: 188 LBS | SYSTOLIC BLOOD PRESSURE: 115 MMHG | BODY MASS INDEX: 34.6 KG/M2 | DIASTOLIC BLOOD PRESSURE: 58 MMHG | HEART RATE: 106 BPM | HEIGHT: 62 IN

## 2021-03-31 DIAGNOSIS — B37.3 CANDIDAL VULVOVAGINITIS: ICD-10-CM

## 2021-03-31 DIAGNOSIS — Z3A.30 30 WEEKS GESTATION OF PREGNANCY: Primary | ICD-10-CM

## 2021-03-31 PROCEDURE — 99213 OFFICE O/P EST LOW 20 MIN: CPT | Performed by: OBSTETRICS & GYNECOLOGY

## 2021-03-31 NOTE — PATIENT INSTRUCTIONS
El embarazo de la semana 27 a la 30   CUIDADO AMBULATORIO:   Qué cambios están ocurriendo en suazo cuerpo: Usted podría notar síntomas nuevos ailyn falta de [place] o inflamación de coretta tobillos y pies  Es posible que también tenga dificultad para dormir o contracciones  Busque atención médica de inmediato si:  · Usted presenta un adam dolor de tamanna que no desaparece  · Usted tiene cambios en la visión nuevos o en aumento, ailyn visión borrosa o con manchas  · Usted tiene inflamación nueva o creciente en suazo charleen o everardo  · Usted tiene manchado o sangrado vaginal     · Usted rompe cyril o siente un chorro o gotas de agua tibia que le está bajando por suazo vagina  Comuníquese con suazo médico si:  · Usted tiene más de 5 contracciones en 1 hora  · Usted nota algún cambio en los movimientos de suazo bebé  · Usted tiene calambres, presión o tensión abdominal     · Usted tiene un cambio en la secreción vaginal     · Usted tiene escalofríos o fiebre  · Usted tiene comezón, ardor o dolor vaginal     · Usted tiene virginia secreción vaginal amarillenta, verdosa, serge o de [employer]  · Usted tiene dolor o ardor al [place], orina menos de lo habitual o tiene [place] rosada o sanguinolenta  · Usted tiene preguntas o inquietudes acerca de suazo condición o cuidado  Cómo cuidarse en esta etapa de suazo embarazo:  · Consuma alimentos saludables y variados  Alimentos saludables incluyen frutas, verduras, panes de koffi integral, alimentos lácteos bajos en grasa, frijoles, raimundo magras y pescado  [place] líquidos ailyn se le haya indicado  Pregunte cuánto líquido debe sudheer cada día y cuáles líquidos son los más adecuados para usted  Limite el consumo de cafeína a menos de [address]  Limite el consumo de pescado a 2 porciones cada semana  Escoja pescado con concentraciones bajas de zahra ailyn atún al natural enlatado, camarón, salmón, bacalao o tilapia   No coma pescado con concentraciones altas de zahra ailyn pez sarah, caballa gigante, pargo rayado y tiburón  · Controle la acidez comiendo 4 o 5 comidas pequeñas cada día en vez de comidas grandes  Evite los alimentos picantes  · Controle la inflamación al [facility] [employer] y poner los pies en alto o elevados sobre [place]  · [address] [place]  Suazo necesidad de ciertas vitaminas y 53 [facility], ailyn el ácido fólico, aumenta samantha el Jai Pay  Las vitaminas prenatales proporcionan algunas de las vitaminas y minerales adicionales que usted necesita  Las vitaminas prenatales también podrían ayudar a disminuir el riesgo de ciertos defectos de nacimiento  · Consulte con suazo médico acerca de hacer ejercicio  El ejercicio moderado puede ayudarla a mantenerse en forma  Suazo médico la ayudará a planear un programa de ejercicios que sea seguro para usted samantha suazo Jai Pay  · No fume  Fumar aumenta el riesgo de aborto espontáneo y otros problemas de ellis samantha suazo Jai Pay  Fumar puede causar que suazo bebé nazca antes de tiempo o que pese menos al nacer  Solicite información a suazo médico si usted necesita ayuda para dejar de fumar  · No consuma alcohol  El alcohol pasa de suazo cuerpo al bebé a través de la placenta  Puede afectar el desarrollo del cerebro de suazo bebé y provocar el síndrome de alcoholismo fetal (SAF)  El SAF es un regulo de condiciones que causan problemas [place], de comportamiento y de crecimiento  · Consulte con suazo médico antes de sudheer cualquier medicamento  Muchos medicamentos pueden perjudicar a suazo bebé si usted los yovany [address] [place] [address]  No tome ningún medicamento, vitaminas, hierbas o suplementos sin bobby consultar con suazo Mic Attala  use drogas ilegales o de la felipe (ailyn marihuana o cocaína) mientras está embarazada  Consejos de seguridad samantha el embarazo:  · Evite jacuzzis y saunas   No use un jacuzzi o un sauna mientras usted está [place], especialmente samantha el primer trimestre  Los [facility] y los saunas aumentan la temperatura de suazo bebé y el riesgo de defectos de nacimiento  · Evite la toxoplasmosis  [place] es [place] infección causada por comer carne cruda o estar cerca del excremento de un aide infectado  [place] puede causar malformaciones congénitas, aborto espontáneo y Javier Schein  Lávese las everardo después de tocar carne cruda  Asegúrese de que la carne esté ana cocida antes de comerla  Evite los huevos crudos y la [place]  Use guantes o pida que alguien la ayude a limpiar la caja de arena del aide mientras usted Shantell Mel  Cambios que están ocurriendo con suazo bebé: Para las 30 semanas, suazo bebé podría pesar más de 3 libras  Suazo bebé podría medir alrededor de 11 pulgadas de antoni desde la punta de la tamanna hasta la rabadilla (parte inferior del bebé)  Ahora suazo bebé puede abrir y cerrar coretta ojos  Las patadas y movimientos de suazo bebé son más brandan en gabriela momento  Lo que necesita saber acerca del cuidado prenatal: Suazo médico le revisará suazo presión arterial y [place]  Es posible que también necesite lo siguiente:  · Los análisis de maricarmen podrían realizarse para revisar signos de anemia o el tipo de [place]  · Un examen de orina también podría realizarse para revisarle el azúcar y la proteína  Estas son señales de diabetes gestacional o de infección  La proteína en suazo orina también podría ser virginia señal de preeclampsia  La preeclampsia es virginia condición que puede desarrollarse samantha la semana 21 o después en suazo embarazo  Esta provoca presión arterial jameson y [employer] con coretta riñones y [place]  · [place] vacuna contra difteria, tétanos y tos ferina y vacuna contra la gripe podría ser recomendado por suazo médico     · La detección de diabetes gestacional se realizará usando virginia prueba oral de tolerancia a la glucosa   [place] prueba oral de tolerancia a la glucosa comienza con virginia revisión de los niveles de azúcar en la [place] después de que usted no haya comido por 8 horas  Después se le da virginia bebida de glucosa  Le revisan el nivel de azúcar en la maricarmen después de 1 hora, 2 horas y algunas veces 3 horas  Los médicos analizarán si el nivel de azúcar en la maricarmen aumenta después del primer análisis  · La altura uterina es virginia medición del útero para controlar el desarrollo de suazo bebé  [employer] por lo general es igual al número de 11 Maira Li que usted tiene de embarazo  Suazo médico también podría revisar la posición de suazo bebé  · El ritmo cardíaco de suazo bebé será revisado  © Copyright [address] Information is for End User's use only and may not be sold, redistributed or otherwise used for commercial purposes  All illustrations and images included in CareNotes® are the copyrighted property of A D A ki work  or [address] [place] [address] es sólo para uso en educación  Suazo intención no es darle un consejo médico sobre enfermedades o tratamientos  Colsulte con suazo Daja Midget farmacéutico antes de seguir cualquier régimen médico para saber si es seguro y efectivo para usted

## 2021-04-04 ENCOUNTER — TELEPHONE (OUTPATIENT)
Dept: LABOR AND DELIVERY | Facility: HOSPITAL | Age: 26
End: 2021-04-04

## 2021-04-04 NOTE — TELEPHONE ENCOUNTER
I spoke with Dr Pam Bautista and Dr Koffi White regarding timing of delivery  Despite her vertical skin incision, patient has had 2 prior full-term  deliveries  Although we are unable to obtain the op notes for either deliveries at this time, it is probably safe to assume that she had a low-transverse  section for her 1st delivery with a vertical skin incision, rather than a classical  section as indicated in prior charting  [facility] recommendations:  RLTCS+BTL at 39 weeks       Becki Zaragoza MD  OBGYN, PGY-4  2021  2:00 PM

## 2021-04-15 ENCOUNTER — ROUTINE PRENATAL (OUTPATIENT)
Dept: OBGYN CLINIC | Facility: CLINIC | Age: 26
End: 2021-04-15

## 2021-04-15 VITALS
HEIGHT: 62 IN | DIASTOLIC BLOOD PRESSURE: 55 MMHG | HEART RATE: 95 BPM | SYSTOLIC BLOOD PRESSURE: 114 MMHG | WEIGHT: 192 LBS | BODY MASS INDEX: 35.33 KG/M2

## 2021-04-15 DIAGNOSIS — Z30.2 REQUEST FOR STERILIZATION: ICD-10-CM

## 2021-04-15 DIAGNOSIS — Z3A.32 32 WEEKS GESTATION OF PREGNANCY: Primary | ICD-10-CM

## 2021-04-15 PROCEDURE — 99213 OFFICE O/P EST LOW 20 MIN: CPT | Performed by: STUDENT IN AN ORGANIZED HEALTH CARE EDUCATION/TRAINING PROGRAM

## 2021-04-15 NOTE — PROGRESS NOTES
OB/GYN prenatal visit    S: 30 y o  [id number] 32w1d here for PN visit  She has no obstetric complaints, including pelvic pain, contractions, vaginal bleeding, loss of fluid, or decreased fetal movement  Yesterday she had a minor teresa frida contraction  She no longer wants tubal ligation  She would like oral contraceptives after delivery  Yoruba intepreter #[id number] used    COVID19 Screen:    Cough: none  Fever: none  Shortness of breath: none  Known exposures to COVID-19, or international travel: none    O:  Vitals:    04/15/21 1000   BP: 114/55   Pulse: 95       Gen: no acute distress, nonlabored breathing  Fundal Height (cm): 32 cm  Fetal Heart Rate: 130    A/P:    Pregnancy at 32 weeks   Denies ctx, vaginal bleeding, and LoF  Endorses +FM   TWG + 36 (recommended weight gain 11-20lbs)  FHT: 130  Blood Pressure: Normotensive   Fundal Height: 32cm  Tdap 3/17 given  Contraception plan: OCP's, patient does not want tubal ligation at this time   Follow up at next prenatal   Delivery consent signed on 3/31   Discussed fetal kick counts and labor precautions   Birth Plan: Patient plans on delivering at [address] [place] [address] on bottle feeding     History of Prior  Delivery   Patient previously had 2 full term  deliveries   Previous concern for classical  due to vertical abdominal incision   Upon chart review, MFM reviewed her case and determined that she likely did not have a classical and can plan for RLTCS+BTL at 39 weeks    History of Chlamydia in First Trimester   Infection occurred in 2020   [facility]OOK negative in 2021      34 weeks: perineal massage card, contraception and birth plan, Guzman Nixon DO  4/15/2021  10:32 AM

## 2021-04-15 NOTE — PATIENT INSTRUCTIONS
El embarazo de la semana 31 a la 34   CUIDADO AMBULATORIO:   Qué cambios están ocurriendo en suazo cuerpo: Es posible que usted continúe teniendo síntomas tales ailyn falta de [place], [place], contracciones o inflamación en coretta tobillos y pies  Usted podría estar aumentando 1 tamela por semana ahora  Busque atención médica de inmediato si:  · Usted presenta un adam dolor de tamanna que no desaparece  · Usted tiene cambios en la visión nuevos o en aumento, ailyn visión borrosa o con manchas  · Usted tiene inflamación nueva o creciente en suazo charleen o everardo  · Usted tiene manchado o sangrado vaginal     · Usted rompe cyril o siente un chorro o gotas de agua tibia que le está bajando por suazo vagina  Comuníquese con suazo médico si:  · Usted tiene más de 5 contracciones en 1 hora  · Usted nota algún cambio en los movimientos de suazo bebé  · Usted tiene calambres, presión o tensión abdominal     · Usted tiene un cambio en la secreción vaginal     · Usted tiene escalofríos o fiebre  · Usted tiene comezón, ardor o dolor vaginal     · Usted tiene virgin[place] secreción vaginal amarillenta, verdosa, serge o de [employer]  · Usted tiene dolor o ardor al Erminio [place], orina menos de lo habitual o tiene [place] rosada o sanguinolenta  · Usted tiene preguntas o inquietudes acerca de suazo condición o cuidado  Cómo cuidarse en esta etapa de suazo embarazo:  · Consuma alimentos saludables y variados  Alimentos saludables incluyen frutas, verduras, panes de koffi integral, alimentos lácteos bajos en grasa, frijoles, raimundo magras y pescado  [place] líquidos ailyn se le haya indicado  Pregunte cuánto líquido debe sudheer cada día y cuáles líquidos son los más adecuados para usted  Limite el consumo de cafeína a menos de [address]  Limite el consumo de pescado a 2 porciones cada semana  Escoja pescado con concentraciones bajas de zahra ailyn atún al natural enlatado, camarón, salmón, bacalao o tilapia   No coma pescado con concentraciones altas de zahra ailyn pez sarah, caballa gigante, pargo rayado y tiburón  · Controle la acidez comiendo 4 o 5 comidas pequeñas cada día en vez de comidas grandes  Evite los alimentos picantes  · Controle la inflamación al [facility] y poner los pies en alto o elevados sobre [place]  · [id number] [place]  Suazo necesidad de ciertas vitaminas y [address] [facility], ailyn el ácido fólico, aumenta samantha el [facility]  Las vitaminas prenatales proporcionan algunas de las vitaminas y minerales adicionales que usted necesita  Las vitaminas prenatales también podrían ayudar a disminuir el riesgo de ciertos defectos de nacimiento  · Consulte con suazo médico acerca de hacer ejercicio  El ejercicio moderado puede ayudarla a mantenerse en forma  Suazo médico la ayudará a planear un programa de ejercicios que sea seguro para usted samantha suazo [facility]  · No fume  Fumar aumenta el riesgo de aborto espontáneo y otros problemas de ellis samantha suazo [facility]  Fumar puede causar que suazo bebé nazca antes de tiempo o que pese menos al nacer  Solicite información a suazo médico si usted necesita ayuda para dejar de fumar  · No consuma alcohol  El alcohol pasa de suazo cuerpo al bebé a través de la placenta  Puede afectar el desarrollo del cerebro de suazo bebé y provocar el síndrome de alcoholismo fetal (SAF)  El SAF es un regulo de condiciones que causan problemas [place], de comportamiento y de crecimiento  · Consulte con suazo médico antes de sudheer cualquier medicamento  Muchos medicamentos pueden perjudicar a suazo bebé si usted los yovany [address] [place] [address]  No tome ningún medicamento, vitaminas, hierbas o suplementos sin bobby consultar con suazo Buddyence Mohamud  use drogas ilegales o de la felipe (ailyn marihuana o cocaína) mientras está embarazada  Consejos de seguridad samantha el embarazo:  · Evite jacuzzis y saunas   No use un jacuzzi o un sauna mientras usted está [place], especialmente samantha el primer trimestre  Los [place] [facility] y los saunas aumentan la temperatura de jolley bebé y el riesgo de defectos de nacimiento  · Evite la toxoplasmosis  [place] es [place] infección causada por comer carne cruda o estar cerca del excremento de un aide infectado  [place] puede causar malformaciones congénitas, aborto espontáneo y Javier Schein  Lávese las everardo después de tocar carne cruda  Asegúrese de que la carne esté ana cocida antes de comerla  Evite los huevos crudos y la Lehman Snide  Use guantes o pida que alguien la ayude a limpiar la caja de arena del aide mientras usted [place]  Cambios que están ocurriendo con jolley bebé: Para las 34 semanas, jolley bebé podría pesar más de 5 [address]  Jolley bebé medirá alrededor de 12 ½ pulgadas de antoni desde el melina de la tamanna hasta la rabadilla (parte inferior del bebé)  Jolley bebé está aumentando alrededor de ½ tamela por semana  Ahora jolley bebé puede abrir y cerrar coretta ojos  Las patadas y movimientos de jolley bebé son más brandan en gabriela momento  Lo que necesita saber acerca del cuidado prenatal: Jolley médico le revisará jolley presión arterial y [place]  Es posible que también necesite lo siguiente:  · Un examen de orina también podría realizarse para revisarle el azúcar y la proteína  Estas son señales de diabetes gestacional o de infección  La proteína en jolley orina también podría ser virgin[place] señal de preeclampsia  La preeclampsia es virginia condición que puede desarrollarse samantha la semana 21 o después en jolley embarazo  Esta provoca presión arterial jameson y [employer] con coretta riñones y [place]  · La vacuna Tdap podría ser recomendado por jolley médico     · La altura uterina es virginia medición del útero para controlar el desarrollo de jolley bebé  [employer] por lo general es igual al número de 11 Maira Li que usted tiene de embarazo  Jolley médico también podría revisar la posición de jolley bebé  · El ritmo cardíaco de jolley bebé será revisado      © Copyright [employer] [address] [facility] Information is for [employer] use only and may not be sold, redistributed or otherwise used for commercial purposes  All illustrations and images included in CareNotes® are the copyrighted property of A D A M , Inc  or [address] [place] [address] es sólo para uso en educación  Suazo intención no es darle un consejo médico sobre enfermedades o tratamientos  Colsulte con suazo Stepan Lerma farmacéutico antes de seguir cualquier régimen médico para saber si es seguro y efectivo para usted

## 2021-04-15 NOTE — ASSESSMENT & PLAN NOTE
Patient no longer would like sterilization following   · Discussed other forms of contraceptions including, OCP's, nexplanon, IUD, patch, and depo  Patient will think about this and follow up at next prenatal visit

## 2021-04-28 ENCOUNTER — ULTRASOUND (OUTPATIENT)
Dept: PERINATAL CARE | Facility: CLINIC | Age: 26
End: 2021-04-28

## 2021-04-28 VITALS
HEIGHT: 62 IN | DIASTOLIC BLOOD PRESSURE: 58 MMHG | WEIGHT: 192.6 LBS | HEART RATE: 97 BPM | SYSTOLIC BLOOD PRESSURE: 109 MMHG | BODY MASS INDEX: 35.44 KG/M2

## 2021-04-28 DIAGNOSIS — Z3A.34 34 WEEKS GESTATION OF PREGNANCY: ICD-10-CM

## 2021-04-28 DIAGNOSIS — O28.3 INCREASED NUCHAL TRANSLUCENCY SPACE ON FETAL ULTRASOUND: ICD-10-CM

## 2021-04-28 DIAGNOSIS — O99.213 OBESITY AFFECTING PREGNANCY IN THIRD TRIMESTER: ICD-10-CM

## 2021-04-28 DIAGNOSIS — Z36.89 ENCOUNTER FOR ULTRASOUND TO ASSESS FETAL GROWTH: Primary | ICD-10-CM

## 2021-04-28 DIAGNOSIS — Z98.891 HISTORY OF CLASSICAL CESAREAN SECTION: ICD-10-CM

## 2021-04-28 PROCEDURE — 76816 OB US FOLLOW-UP PER FETUS: CPT | Performed by: OBSTETRICS & GYNECOLOGY

## 2021-04-28 PROCEDURE — 99213 OFFICE O/P EST LOW 20 MIN: CPT | Performed by: OBSTETRICS & GYNECOLOGY

## 2021-04-28 NOTE — LETTER
2021     Sharan Seip, MD  [address]  [address]  [address] [place] [address]    Patient: Corrinne Jewett   YOB: 1995   Date of Visit: 2021       Dear Dr Alfaro Laughter:    [facility] just was [facility] about her  history and her first delivery really doesn't sound like it was a classical  It was at full term in [place] for a baby who was "too big to fit " She was never told not to deliver vaginally or told she had a classical  I know she has the vertical skin incision but this is pretty typical in [facility] even with LSTCS  I think she should be fine to go to 39 weeks for repeat CS, unless I'm missing something  Just wanted to make sure we are on the same page  What do you think? Thanks! Cristian Acevedo       Sincerely,        Milena Jefferson MD        CC: Favio Baker, Ryan Chance MD  2021 10:55 AM  Sign when Signing Visit  [address] [place] [address] Road: Ms Savi Solis was seen today at 34w0d for fetal growth assessment ultrasound  See ultrasound report under "OB Procedures" tab    Please don't hesitate to contact our office with any concerns or questions   -Milena Jefferson MD

## 2021-04-28 NOTE — PROGRESS NOTES
[address]: Ms Radha Woodruff was seen today at 34w0d for fetal growth assessment ultrasound  See ultrasound report under "OB Procedures" tab    Please don't hesitate to contact our office with any concerns or questions   -Colt Mccord MD

## 2021-04-29 ENCOUNTER — ROUTINE PRENATAL (OUTPATIENT)
Dept: OBGYN CLINIC | Facility: CLINIC | Age: 26
End: 2021-04-29

## 2021-04-29 VITALS
WEIGHT: 192 LBS | HEIGHT: 62 IN | SYSTOLIC BLOOD PRESSURE: 100 MMHG | BODY MASS INDEX: 35.33 KG/M2 | DIASTOLIC BLOOD PRESSURE: 60 MMHG | HEART RATE: 98 BPM

## 2021-04-29 DIAGNOSIS — Z3A.34 34 WEEKS GESTATION OF PREGNANCY: Primary | ICD-10-CM

## 2021-04-29 LAB
SL AMB  POCT GLUCOSE, UA: NORMAL
SL AMB LEUKOCYTE ESTERASE,UA: NORMAL
SL AMB POCT KETONES,UA: NORMAL
SL AMB POCT URINE PROTEIN: NORMAL

## 2021-04-29 PROCEDURE — 81002 URINALYSIS NONAUTO W/O SCOPE: CPT | Performed by: STUDENT IN AN ORGANIZED HEALTH CARE EDUCATION/TRAINING PROGRAM

## 2021-04-29 PROCEDURE — 99213 OFFICE O/P EST LOW 20 MIN: CPT | Performed by: STUDENT IN AN ORGANIZED HEALTH CARE EDUCATION/TRAINING PROGRAM

## 2021-04-29 NOTE — PROGRESS NOTES
Nathalia Braden is a 30 y o  [id number] at 34w3d     Chief complaint today: none    ROS:   VB: denies   LOF: denies   CXN: denies   FM: present    Vitals:    21 1000   BP: 100/60   Pulse: 98        bpm, S = D, baby girl       2 prior  sections   - Plan repeat  section at 39w   - Per MFM, may consider both prior sections LTCS   - Schedule at next visit, 39w0d is 21   - Second son's birthday is 6/3/15! GBS collect next visit, hx of GC collect next visit as well   - Patient does not have an allergy to PCN     Postpartum contraception   - Reviewed the most effective form of contraception, hormonal and non-hormonal LARCs including Nexplanon, IUDs; also reviewed less effective but viable contraceptive options, such as depoprovera, minipill, fertility awareness, pt would like to use Nexplanon: she is unsure if she would like more children and is hesitant today about permanent sterilization  RTC in 2 weeks   - Reviewed  labor precautions as well as ROM, dec FM, vaginal bleeding, and true vs false labor, pt to call with any questions and come to hospital with any concerns    COVID 19 precautions were discussed with patient at length, reviewed symptoms, hygiene, social distancing, patient to call office  with questions/concerns      Future Appointments   Date Time Provider Madelyn Wiley   2021 10:45 AM MD Yumiko Carrasco [address] [place] [address] BE [address] [place] [address], DO  PGY-4 OB/GYN   2021 1:11 PM

## 2021-05-13 ENCOUNTER — ROUTINE PRENATAL (OUTPATIENT)
Dept: OBGYN CLINIC | Facility: CLINIC | Age: 26
End: 2021-05-13

## 2021-05-13 VITALS
BODY MASS INDEX: 35.7 KG/M2 | HEIGHT: 62 IN | SYSTOLIC BLOOD PRESSURE: 108 MMHG | WEIGHT: 194 LBS | DIASTOLIC BLOOD PRESSURE: 58 MMHG | HEART RATE: 104 BPM

## 2021-05-13 DIAGNOSIS — Z3A.36 36 WEEKS GESTATION OF PREGNANCY: Primary | ICD-10-CM

## 2021-05-13 DIAGNOSIS — Z11.3 SCREEN FOR STD (SEXUALLY TRANSMITTED DISEASE): ICD-10-CM

## 2021-05-13 PROCEDURE — 87591 N.GONORRHOEAE DNA AMP PROB: CPT | Performed by: OBSTETRICS & GYNECOLOGY

## 2021-05-13 PROCEDURE — 87150 DNA/RNA AMPLIFIED PROBE: CPT | Performed by: OBSTETRICS & GYNECOLOGY

## 2021-05-13 PROCEDURE — 99213 OFFICE O/P EST LOW 20 MIN: CPT | Performed by: STUDENT IN AN ORGANIZED HEALTH CARE EDUCATION/TRAINING PROGRAM

## 2021-05-13 PROCEDURE — 87491 CHLMYD TRACH DNA AMP PROBE: CPT | Performed by: OBSTETRICS & GYNECOLOGY

## 2021-05-13 NOTE — PROGRESS NOTES
OB/GYN prenatal visit    S: 30 y o  [id number] 36w1d here for PN visit  She denies obstetric complaints, including pelvic pain, contractions, vaginal bleeding, loss of fluid, or decreased fetal movement  O:  Vitals:    21 1000   BP: 108/58   Pulse: 104       Gen: no acute distress, nonlabored breathing  Fetal Heart Rate: 135    A/P:      IUP at 36w1d  GBS and GC/CT collected today  No obstetric complaints today  TWG: + 38lb (recommended weight gain 11-20lb)  TDAP vaccine 3/17/21  Contraception: postpartum Mirena IUD placement  Breastfeeding: breast and bottle feed  Birth plan: RLTCS  Discussed  labor precautions and fetal kick counts    Return to clinic in 1 week    History of two prior  section  Scheduled her for RLTCS at 39w1d on 6/3/21 at 10:00am  Patient's son's birthday is 6/3 as well and she would like them to share the same birthday, rather than deliver on  at 39w0d  Precautions given for when to present to L&D for OB evaluation       COVID 19 precautions were discussed with patient at length, reviewed symptoms, hygiene, social distancing, patient to call office  with questions/concerns      Paula Mariee MD  2021  11:00 AM

## 2021-05-13 NOTE — PATIENT INSTRUCTIONS
El embarazo de la semana 35 a la 38   CUIDADO AMBULATORIO:   Qué cambios están ocurriendo en suazo cuerpo: Al principio de las 37 semanas se considera que usted está en término completo  Podría ser mas fácil que usted respire si suazo bebé se ha posicionado con la tamanna hacia abajo  Es posible que usted necesite orinar con mayor frecuencia ya que el bebé podría estar presionando suazo vejiga  Usted también podría sentir más molestias y cansarse fácilmente  Busque atención médica de inmediato si:  · Usted presenta un adam dolor de tamanna que no desaparece  · Usted tiene cambios en la visión nuevos o en aumento, ailyn visión borrosa o con manchas  · Usted tiene inflamación nueva o creciente en suazo charleen o everardo  · Usted tiene manchado o sangrado vaginal     · Usted rompe cyril o siente un chorro o gotas de agua tibia que le está bajando por suazo vagina  Comuníquese con suazo médico si:  · Usted tiene más de 5 contracciones en 1 hora  · Usted nota algún cambio en los movimientos de suazo bebé  · Usted tiene calambres, presión o tensión abdominal     · Usted tiene un cambio en la secreción vaginal     · Usted tiene escalofríos o fiebre  · Usted tiene comezón, ardor o dolor vaginal     · Usted tiene virgin[place] secreción vaginal amarillenta, verdosa, serge o de [employer]  · Usted tiene dolor o ardor al Doreene Ruth, orina menos de lo habitual o tiene [place] rosada o sanguinolenta  · Usted tiene preguntas o inquietudes acerca de suazo condición o cuidado  Cómo cuidarse en esta etapa de suazo embarazo:  · Consuma alimentos saludables y variados  Alimentos saludables incluyen frutas, verduras, panes de koffi integral, alimentos lácteos bajos en grasa, frijoles, raimundo magras y pescado  [place] líquidos ailyn se le haya indicado  Pregunte cuánto líquido debe sudheer cada día y cuáles líquidos son los más adecuados para usted  Limite el consumo de cafeína a menos de [address]   Limite el consumo de pescado a 2 porciones cada semana  Escoja pescado con concentraciones bajas de zahra ailyn atún al natural enlatado, camarón, salmón, bacalao o tilapia  No coma pescado con concentraciones altas de zahra ailyn pez sarah, caballa gigante, pargo rayado y tiburón  · [address] [place] [address]  Jolley necesidad de ciertas vitaminas y [address], ailyn el ácido fólico, aumenta samantha el [facility]  Las vitaminas prenatales proporcionan algunas de las vitaminas y minerales adicionales que usted necesita  Las vitaminas prenatales también podrían ayudar a disminuir el riesgo de ciertos defectos de nacimiento  · [place] tanto ailyn sea necesario  Levante coretta pies si tiene inflamación en coretta tobillos y pies  · No fume  Fumar aumenta el riesgo de aborto espontáneo y otros problemas de ellis samantha jolley [facility]  Fumar puede causar que jolley bebé nazca antes de tiempo o que pese menos al nacer  Solicite información a jolley médico si usted necesita ayuda para dejar de fumar  · No consuma alcohol  El alcohol pasa de jolley cuerpo al bebé a través de la placenta  Puede afectar el desarrollo del cerebro de jolley bebé y provocar el síndrome de alcoholismo fetal (SAF)  El SAF es un regulo de condiciones que causan problemas [facility], de comportamiento y de crecimiento  · Consulte con jolley médico antes de sudheer cualquier medicamento  Muchos medicamentos pueden perjudicar a jolley bebé si usted los yovany [address] [place] [address]  No tome ningún medicamento, vitaminas, hierbas o suplementos sin bobby consultar con jolley Rigoberto Presume  use drogas ilegales o de la felipe (ailyn marihuana o cocaína) mientras está embarazada  · Hable con jolley médico antes de viajar  Es posible que no pueda viajar en avión después de las [address]  También le puede recomendar que evite largos viajes por carretera  Consejos de seguridad samantha el embarazo:  · Evite jacuzzis y saunas   No use un jacuzzi o un sauna mientras usted está [place], especialmente samantha el primer trimestre  [place]  y los saunas aumentan la temperatura de jolley bebé y el riesgo de defectos de nacimiento  · Evite la toxoplasmosis  [place] es [place] infección causada por comer carne cruda o estar cerca del excremento de un aide infectado  [place] puede causar malformaciones congénitas, aborto espontáneo y Javier Schein  Lávese las everardo después de tocar carne cruda  Asegúrese de que la carne esté ana cocida antes de comerla  Evite los huevos crudos y la [place]  Use guantes o pida que alguien la ayude a limpiar la caja de arena del aide mientras darryl Tran Serve  · Consulte con jolley médico acerca de viajar  El [address] cómodo para viajar es samantha el mayr trimestre  Pregunte a jolley médico si usted puede viajar después de las 36 semanas  Es posible que no pueda viajar en avión después de las 36 [facility]  También le puede recomendar que evite largos viajes por carretera  Cambios que están ocurriendo con jolley bebé: Para las 38 semanas, jolley bebé podría pesar entre 6 y 5 libras  Jolley bebé podría medir alrededor de 14 pulgadas de antoni desde la punta de la tamanna hasta la rabadilla (parte inferior del bebé)  Jolley bebé escucha lo suficientemente ana ailyn para reconocer jolley voz  Conforme jolley bebé crece más, usted podría sentir menos patadas y sentir más que jolley bebé se estira y [place]  Jolley bebé podría moverse en posición con la tamanna hacia abajo  Jolley bebé también descansará en la parte inferior de jolley abdomen  Lo que necesita saber acerca del cuidado prenatal: Jolley médico le revisará jolley presión arterial y [place]  Es posible que también necesite lo siguiente:  · Un examen de orina también podría realizarse para revisarle el azúcar y la proteína  Estas son señales de diabetes gestacional o de infección  La proteína en jolley orina también podría ser virgin[place] señal de preeclampsia  La preeclampsia es virginia condición que puede desarrollarse samantha la semana 21 o después en jolley embarazo   Esta provoca presión arterial jameson y [employer] con coretta riñones y [place]  · Los análisis de maricarmen se pueden realizar para revisar si tiene signos de anemia (nivel bajo del wade)  · La vacuna Tdap podría ser recomendado por suazo médico     · El examen del estreptococo del regulo B es un examen que se realiza para verificar si hay infección por estreptococos del regulo B  El estreptococo del regulo B es un tipo de bacteria que se puede encontrar en la vagina o el recto  Podría ser transmitida a suazo bebé samantha el parto si usted la tiene  Suazo médico podría sudheer [place] de suazo vagina o recto y leah la [employer] [place] al laboratorio para que la examinen  · La altura uterina es virginia medición del útero para controlar el desarrollo de suazo bebé  [employer] por lo general es igual al número de [facility] que usted tiene de embarazo  Suazo médico también podría revisar la posición de suazo bebé  · El ritmo cardíaco de suazo bebé será revisado  © Copyright [address] Information is for End User's use only and may not be sold, redistributed or otherwise used for commercial purposes  All illustrations and images included in CareNotes® are the copyrighted property of [employer]  or [address] [place] [address] es sólo para uso en educación  Suazo intención no es darle un consejo médico sobre enfermedades o tratamientos  Colsulte con suazo Charna Heckler farmacéutico antes de seguir cualquier régimen médico para saber si es seguro y efectivo para usted

## 2021-05-15 LAB
C TRACH DNA SPEC QL NAA+PROBE: NEGATIVE
GP B STREP DNA SPEC QL NAA+PROBE: NEGATIVE
N GONORRHOEA DNA SPEC QL NAA+PROBE: NEGATIVE

## 2021-05-20 ENCOUNTER — ROUTINE PRENATAL (OUTPATIENT)
Dept: OBGYN CLINIC | Facility: CLINIC | Age: 26
End: 2021-05-20

## 2021-05-20 VITALS
BODY MASS INDEX: 35.88 KG/M2 | DIASTOLIC BLOOD PRESSURE: 56 MMHG | WEIGHT: 195 LBS | SYSTOLIC BLOOD PRESSURE: 107 MMHG | HEART RATE: 90 BPM | HEIGHT: 62 IN

## 2021-05-20 DIAGNOSIS — Z3A.36 36 WEEKS GESTATION OF PREGNANCY: ICD-10-CM

## 2021-05-20 DIAGNOSIS — Z3A.37 37 WEEKS GESTATION OF PREGNANCY: Primary | ICD-10-CM

## 2021-05-20 LAB
SL AMB  POCT GLUCOSE, UA: NORMAL
SL AMB POCT URINE PROTEIN: NORMAL

## 2021-05-20 PROCEDURE — PNV: Performed by: STUDENT IN AN ORGANIZED HEALTH CARE EDUCATION/TRAINING PROGRAM

## 2021-05-20 PROCEDURE — 81002 URINALYSIS NONAUTO W/O SCOPE: CPT | Performed by: STUDENT IN AN ORGANIZED HEALTH CARE EDUCATION/TRAINING PROGRAM

## 2021-05-20 NOTE — PROGRESS NOTES
Routine OB prenatal visit    29 yo [id number] 37w1d presents for routine prenatal visit  Patient has no concerns a this appointment  Denies headaches, change in vision, no uterine contractions, VB, LF  FM present  GBS negative     Vitals:    05/20/21 0911   BP: 107/56   Pulse: 90     FHR: 130  FH 37 cm    A/P    IUP at 37 weeks  -Patient here for prenatal visit   No concerns   -GBS negative  -Contraception: Mirena IUD postpartum  -Scheduled RLTCS on 6/3  -Advised to call the office immediately if any obstetric concerns  -Follow up in 1 week

## 2021-05-27 ENCOUNTER — ROUTINE PRENATAL (OUTPATIENT)
Dept: OBGYN CLINIC | Facility: CLINIC | Age: 26
End: 2021-05-27

## 2021-05-27 VITALS
HEIGHT: 62 IN | DIASTOLIC BLOOD PRESSURE: 55 MMHG | HEART RATE: 99 BPM | SYSTOLIC BLOOD PRESSURE: 112 MMHG | WEIGHT: 194 LBS | BODY MASS INDEX: 35.7 KG/M2

## 2021-05-27 DIAGNOSIS — Z3A.38 38 WEEKS GESTATION OF PREGNANCY: Primary | ICD-10-CM

## 2021-05-27 PROCEDURE — 99213 OFFICE O/P EST LOW 20 MIN: CPT | Performed by: STUDENT IN AN ORGANIZED HEALTH CARE EDUCATION/TRAINING PROGRAM

## 2021-05-27 NOTE — PROGRESS NOTES
Assessment & Plan  30 y o  [id number] at 38w1d presenting for routine prenatal visit  Routine prenatal  -GBS negative  -GC/CT negative   -Patient scheduled for RLTCS on 6/[facility] at [facility]  Delivery consent on file  -Plan for postpartum Benny Pantoja IUD placement  -Plan for breast and bottle feed  -Advised to call back the office if she has any obstetric concerns     Problem List Items Addressed This Visit        Other    38 weeks gestation of pregnancy - Primary        ____________________________________________________________  Subjective  She is without complaint  She denies contractions, loss of fluid, or vaginal bleeding  She feels regular fetal movements  Objective  /55   Pulse 99   Ht 5' 2" (1 575 m)   Wt 88 kg (194 lb)   LMP  (LMP Unknown)   BMI 35 48 kg/m²     Fetal Heart Rate: 150   Gen: Alert   Oriented, no acute distress  Abdomen:   FH: 38  FHR: 150      Patient's Active Problem List  Patient Active Problem List   Diagnosis    Increased nuchal translucency space on fetal ultrasound    38 weeks gestation of pregnancy    Chlamydia trachomatis infection in mother during first trimester of pregnancy    Anemia during pregnancy in second trimester    Obesity affecting pregnancy    Request for sterilization

## 2021-05-29 ENCOUNTER — HOSPITAL ENCOUNTER (EMERGENCY)
Facility: HOSPITAL | Age: 26
Discharge: HOME | End: 2021-05-30
Attending: EMERGENCY MEDICINE | Admitting: EMERGENCY MEDICINE

## 2021-05-29 VITALS
RESPIRATION RATE: 20 BRPM | DIASTOLIC BLOOD PRESSURE: 56 MMHG | WEIGHT: 194 LBS | TEMPERATURE: 99.2 F | HEART RATE: 125 BPM | SYSTOLIC BLOOD PRESSURE: 120 MMHG | OXYGEN SATURATION: 98 % | BODY MASS INDEX: 35.48 KG/M2

## 2021-05-29 DIAGNOSIS — B34.9 VIRAL ILLNESS: Primary | ICD-10-CM

## 2021-05-29 PROCEDURE — U0005 INFEC AGEN DETEC AMPLI PROBE: HCPCS | Performed by: EMERGENCY MEDICINE

## 2021-05-29 PROCEDURE — 76815 OB US LIMITED FETUS(S): CPT | Performed by: EMERGENCY MEDICINE

## 2021-05-29 PROCEDURE — 99283 EMERGENCY DEPT VISIT LOW MDM: CPT

## 2021-05-29 PROCEDURE — U0003 INFECTIOUS AGENT DETECTION BY NUCLEIC ACID (DNA OR RNA); SEVERE ACUTE RESPIRATORY SYNDROME CORONAVIRUS 2 (SARS-COV-2) (CORONAVIRUS DISEASE [COVID-19]), AMPLIFIED PROBE TECHNIQUE, MAKING USE OF HIGH THROUGHPUT TECHNOLOGIES AS DESCRIBED BY CMS-2020-01-R: HCPCS | Performed by: EMERGENCY MEDICINE

## 2021-05-29 PROCEDURE — 99284 EMERGENCY DEPT VISIT MOD MDM: CPT | Performed by: EMERGENCY MEDICINE

## 2021-05-29 RX ORDER — GUAIFENESIN 600 MG
600 TABLET, EXTENDED RELEASE 12 HR ORAL ONCE
Status: COMPLETED | OUTPATIENT
Start: 2021-05-29 | End: 2021-05-30

## 2021-05-29 RX ADMIN — DEXAMETHASONE SODIUM PHOSPHATE 10 MG: 10 INJECTION, SOLUTION INTRAMUSCULAR; INTRAVENOUS at 23:04

## 2021-05-30 LAB — SARS-COV-2 RNA RESP QL NAA+PROBE: NEGATIVE

## 2021-05-30 RX ADMIN — GUAIFENESIN 600 MG: 600 TABLET, EXTENDED RELEASE ORAL at 00:54

## 2021-05-30 NOTE — DISCHARGE INSTRUCTIONS
You have been seen for a viral illness  You should return to the ED if you develop trouble breathing or other worsening symptoms  Follow up with your primary care doctor  Take Mucinex for cough and congestion  Take Tylenol for fever and body aches

## 2021-05-30 NOTE — ED ATTENDING ATTESTATION
5/29/2021  IKatiana MD, saw and evaluated the patient  I have discussed the patient with the resident/non-physician practitioner and agree with the resident's/non-physician practitioner's findings, Plan of Care, and MDM as documented in the resident's/non-physician practitioner's note, except where noted  All available labs and Radiology studies were reviewed  I was present for key portions of any procedure(s) performed by the resident/non-physician practitioner and I was immediately available to provide assistance  At this point I agree with the current assessment done in the Emergency Department  I have conducted an independent evaluation of this patient a history and physical is as follows:   The patient is here requesting COVID swab the patient has a mild sore throat a mild cough nasal congestion she has had no fever or chills she has had no shortness of breath she has had no leg pain or leg swelling she has had no abdominal pain she has had no vaginal bleeding  Patient is to be induced for her pregnancy on Wednesday  No known COVID exposure no loss of taste or smell  Exam the patient has a normal voice she has no stridor no drooling posterior pharynx is mildly erythematous the uvula is midline no cervical adenopathy neck is supple lungs clear no rales are noted no increased work of breathing heart is mildly tachycardic at 105  Abdomen is gravid no tenderness is noted extremities normal  Impression upper respiratory symptoms sore throat likely viral in nature  Will do COVID swab patient will be given Decadron for her sore throat she will be advised to follow-up with her OB doctor as scheduled return for any new or worsening symptoms  ED Course         Critical Care Time  Procedures

## 2021-05-30 NOTE — ED PROVIDER NOTES
History  Chief Complaint   Patient presents with    Sore Throat     pt c/o sore throat, cough and fever, cp with cough  69-year-old  female who is 38 weeks and 3 days pregnant presents with sore throat, cough, muscle aches, and subjective fever  Patient states that the symptoms started yesterday  Her cough is productive  Patient feels like she has fever and chills, but has not taken her temperature  Patient does not have chest pain while coughing, despite what the triage note states  Patient says she feels a weakness in her chest when she coughs  She says it is not a pain  Patient also notes loss of smell  She has no shortness of breath or diarrhea  She reports no sick contacts  Patient took Tylenol an hour prior to arrival and she says she felt a little better afterwards  She has not tried anything else for her symptoms  Patient is having a  on Thursday   [id number] used  Prior to Admission Medications   Prescriptions Last Dose Informant Patient Reported? Taking?    Irutqd-MlRxv-DoDgb-FA-CA-Omega (Complete Page DHA) 29-1-200 & 250 MG MISC  Self No No   Sig: TWICE A DAY   ferrous sulfate 324 (65 Fe) mg  Self No No   Sig: Take 1 tablet (324 mg total) by mouth every other day   miconazole (MONISTAT 7) 100 mg vaginal suppository  Self No No   Sig: Insert 1 suppository (100 mg total) into the vagina daily at bedtime   Patient not taking: Reported on 4/15/2021      Facility-Administered Medications: None       Past Medical History:   Diagnosis Date    Patient denies medical problems        Past Surgical History:   Procedure Laterality Date     SECTION      x2       Family History   Problem Relation Age of Onset    No Known Problems Mother     Heart attack Father 62    No Known Problems Sister     No Known Problems Brother     No Known Problems Son     No Known Problems Son     Uterine cancer Maternal Aunt      I have reviewed and agree with the history as documented  E-Cigarette/Vaping    E-Cigarette Use Never User      E-Cigarette/Vaping Substances    Nicotine No     THC No     CBD No     Flavoring No     Other No     Unknown No      Social History     Tobacco Use    Smoking status: Never Smoker    Smokeless tobacco: Never Used   Substance Use Topics    Alcohol use: Not Currently     Frequency: Never     Binge frequency: Never    Drug use: Not Currently        Review of Systems   Constitutional: Positive for chills and fever  Negative for appetite change and fatigue  HENT: Positive for sore throat  Negative for congestion and rhinorrhea  Eyes: Negative for pain and redness  Respiratory: Positive for cough and chest tightness  Negative for shortness of breath and wheezing  Cardiovascular: Negative for chest pain and palpitations  Gastrointestinal: Negative for abdominal pain, diarrhea, nausea and vomiting  Endocrine: Negative  Genitourinary: Negative for difficulty urinating and hematuria  Musculoskeletal: Positive for myalgias  Negative for back pain  Skin: Negative for pallor and rash  Allergic/Immunologic: Negative  Neurological: Negative for dizziness, weakness, light-headedness and headaches  Hematological: Negative  Physical Exam  ED Triage Vitals [05/29/21 2227]   Temperature Pulse Respirations Blood Pressure SpO2   99 2 °F (37 3 °C) (!) 125 20 120/56 98 %      Temp Source Heart Rate Source Patient Position - Orthostatic VS BP Location FiO2 (%)   Oral Monitor Sitting Right arm --      Pain Score       5             Orthostatic Vital Signs  Vitals:    05/29/21 2227   BP: 120/56   Pulse: (!) 125   Patient Position - Orthostatic VS: Sitting       Physical Exam  Vitals signs and nursing note reviewed  Constitutional:       General: She is not in acute distress  Appearance: Normal appearance  She is well-developed  HENT:      Head: Normocephalic and atraumatic        Mouth/Throat:      Mouth: Mucous membranes are moist       Pharynx: No oropharyngeal exudate or posterior oropharyngeal erythema  Eyes:      Conjunctiva/sclera: Conjunctivae normal    Neck:      Musculoskeletal: Normal range of motion and neck supple  Cardiovascular:      Rate and Rhythm: Normal rate and regular rhythm  Pulmonary:      Effort: Pulmonary effort is normal  No respiratory distress  Breath sounds: Normal breath sounds  No wheezing, rhonchi or rales  Abdominal:      Palpations: Abdomen is soft  Comments: gravid   Musculoskeletal: Normal range of motion  Skin:     General: Skin is warm and dry  Neurological:      General: No focal deficit present  Mental Status: She is alert and oriented to person, place, and time           ED Medications  Medications   dexamethasone oral liquid 10 mg 1 mL (10 mg Oral Given 5/29/21 2304)   guaiFENesin (MUCINEX) 12 hr tablet 600 mg (600 mg Oral Given 5/30/21 0054)       Diagnostic Studies  Results Reviewed     Procedure Component Value Units Date/Time    Novel Coronavirus (Covid-19),PCR [facility] - 24 Hour Routine [[id number]]  (Normal) Collected: 05/29/21 2305    Lab Status: Final result Specimen: Nares from Nose Updated: 05/30/21 0008     SARS-CoV-2 Negative    Narrative:                        No orders to display         Procedures  POC Pelvic US    Date/Time: 5/29/2021 11:30 PM  Performed by: Gian Ayala DO  Authorized by: Gian Ayala DO     Patient location:  ED  Procedure details:     Exam Type:  Diagnostic    Indications comment:  Pregnant    Assessment for: evaluate fetal viability      Technique:  Transabdominal obstetric (HCG+) exam    Image quality: diagnostic      Image availability:  Images available in PACS  Uterine findings:     Intrauterine pregnancy: identified      Single gestation: identified      Fetal heart rate: identified      Fetal heart rate (bpm):  152  Interpretation:     Ultrasound impressions: normal      Pregnancy findings: intrauterine pregnancy (IUP) ED Course                                       MDM  Number of Diagnoses or Management Options  Viral illness: established and improving  Diagnosis management comments: 40-year-old female presents with cough, sore throat, and myalgias  Will send COVID test   Will give Decadron for her sore throat and Mucinex for her cough  Amount and/or Complexity of Data Reviewed  Clinical lab tests: ordered and reviewed  Review and summarize past medical records: yes  Discuss the patient with other providers: yes    Risk of Complications, Morbidity, and/or Mortality  Presenting problems: minimal  Diagnostic procedures: minimal  Management options: minimal    Patient Progress  Patient progress: improved    Patient received Decadron and Mucinex for her symptoms  Her COVID test ended up being negative  Patient was told to stay home until she is feeling better anyway  She was told to take Tylenol for fever and myalgias  She was told to take Mucinex for cough and congestion  Return precautions given  Disposition  Final diagnoses:   Viral illness     Time reflects when diagnosis was documented in both MDM as applicable and the Disposition within this note     Time User Action Codes Description Comment    5/30/2021 12:56 AM Jayna Capellan Add [B34 9] Viral illness       ED Disposition     ED Disposition Condition Date/Time Comment    Discharge Good Sun May 30, 2021 12:56 AM Nikki Lombard discharge to home/self care              Follow-up Information     Follow up With Specialties Details Why Contact Info Additional [address]   [address], [address] [facility] [address]  [address] [place] [address], [address], [address] [place], [place], [address]          Discharge Medication List as of 5/30/2021 12:57 AM      CONTINUE these medications which have NOT CHANGED    Details ferrous sulfate 324 (65 Fe) mg Take 1 tablet (324 mg total) by mouth every other day, Starting Mon 3/15/2021, Normal      miconazole (MONISTAT 7) 100 mg vaginal suppository Insert 1 suppository (100 mg total) into the vagina daily at bedtime, Starting Wed 3/31/2021, Normal      Axnqhm-FvHje-McFpc-FA-CA-Omega (Complete Page DHA) 29-1-200 & 250 MG MISC TWICE A DAY, Normal           No discharge procedures on file  PDMP Review     None           ED Provider  Attending physically available and evaluated Flako Soto I managed the patient along with the ED Attending      Electronically Signed by         Helen Ferro DO  [phone]

## 2021-06-02 ENCOUNTER — ANESTHESIA EVENT (INPATIENT)
Dept: LABOR AND DELIVERY | Facility: HOSPITAL | Age: 26
DRG: 540 | End: 2021-06-02
Payer: COMMERCIAL

## 2021-06-02 NOTE — ANESTHESIA PREPROCEDURE EVALUATION
Procedure:   SECTION () REPEAT (N/A Uterus)    Relevant Problems   GYN   (+) 38 weeks gestation of pregnancy      HEMATOLOGY   (+) Anemia during pregnancy in second trimester             Anesthesia Plan  ASA Score- 2     Anesthesia Type- spinal with ASA Monitors  Additional Monitors:   Airway Plan:           Plan Factors-Exercise tolerance (METS): >4 METS  Chart reviewed  EKG reviewed  Imaging results reviewed  Existing labs reviewed  Patient summary reviewed  Induction- intravenous  Postoperative Plan- Plan for postoperative opioid use  Planned trial extubation    Informed Consent- Anesthetic plan and risks discussed with patient  I personally reviewed this patient with the CRNA  Discussed and agreed on the Anesthesia Plan with the CRNA  Alyson Vilchis

## 2021-06-03 ENCOUNTER — ANESTHESIA (INPATIENT)
Dept: LABOR AND DELIVERY | Facility: HOSPITAL | Age: 26
DRG: 540 | End: 2021-06-03
Payer: COMMERCIAL

## 2021-06-03 ENCOUNTER — HOSPITAL ENCOUNTER (INPATIENT)
Facility: HOSPITAL | Age: 26
LOS: 2 days | Discharge: HOME | DRG: 540 | End: 2021-06-05
Attending: OBSTETRICS & GYNECOLOGY | Admitting: OBSTETRICS & GYNECOLOGY
Payer: COMMERCIAL

## 2021-06-03 DIAGNOSIS — O34.219 PREVIOUS CESAREAN SECTION COMPLICATING PREGNANCY: ICD-10-CM

## 2021-06-03 DIAGNOSIS — Z3A.39 39 WEEKS GESTATION OF PREGNANCY: Primary | ICD-10-CM

## 2021-06-03 LAB
ABO GROUP BLD: NORMAL
BASE EXCESS BLDCOA CALC-SCNC: -1.8 MMOL/L (ref 3–11)
BASE EXCESS BLDCOV CALC-SCNC: -2.6 MMOL/L (ref 1–9)
BASOPHILS # BLD AUTO: 0.03 THOUSANDS/ΜL (ref 0–0.1)
BASOPHILS NFR BLD AUTO: 0 % (ref 0–1)
BLD GP AB SCN SERPL QL: NEGATIVE
EOSINOPHIL # BLD AUTO: 0.16 THOUSAND/ΜL (ref 0–0.61)
EOSINOPHIL NFR BLD AUTO: 2 % (ref 0–6)
ERYTHROCYTE [DISTWIDTH] IN BLOOD BY AUTOMATED COUNT: 12.8 % (ref 11.6–15.1)
HCO3 BLDCOA-SCNC: 24.3 MMOL/L (ref 17.3–27.3)
HCO3 BLDCOV-SCNC: 23.1 MMOL/L (ref 12.2–28.6)
HCT VFR BLD AUTO: 33.9 % (ref 34.8–46.1)
HGB BLD-MCNC: 11.2 G/DL (ref 11.5–15.4)
HOLD SPECIMEN: NORMAL
IMM GRANULOCYTES # BLD AUTO: 0.16 THOUSAND/UL (ref 0–0.2)
IMM GRANULOCYTES NFR BLD AUTO: 2 % (ref 0–2)
LYMPHOCYTES # BLD AUTO: 1.33 THOUSANDS/ΜL (ref 0.6–4.47)
LYMPHOCYTES NFR BLD AUTO: 14 % (ref 14–44)
MCH RBC QN AUTO: 27.7 PG (ref 26.8–34.3)
MCHC RBC AUTO-ENTMCNC: 33 G/DL (ref 31.4–37.4)
MCV RBC AUTO: 84 FL (ref 82–98)
MONOCYTES # BLD AUTO: 0.77 THOUSAND/ΜL (ref 0.17–1.22)
MONOCYTES NFR BLD AUTO: 8 % (ref 4–12)
NEUTROPHILS # BLD AUTO: 6.93 THOUSANDS/ΜL (ref 1.85–7.62)
NEUTS SEG NFR BLD AUTO: 74 % (ref 43–75)
NRBC BLD AUTO-RTO: 0 /100 WBCS
O2 CT VFR BLDCOA CALC: 9.1 ML/DL
OXYHGB MFR BLDCOA: 44.5 %
OXYHGB MFR BLDCOV: 64.6 %
PCO2 BLDCOA: 46.3 MM[HG] (ref 30–60)
PCO2 BLDCOV: 43.1 MM HG (ref 27–43)
PH BLDCOA: 7.34 [PH] (ref 7.23–7.43)
PH BLDCOV: 7.35 [PH] (ref 7.19–7.49)
PLATELET # BLD AUTO: 180 THOUSANDS/UL (ref 149–390)
PMV BLD AUTO: 13 FL (ref 8.9–12.7)
PO2 BLDCOA: 21.8 MM HG (ref 5–25)
PO2 BLDCOV: 29.4 MM HG (ref 15–45)
RBC # BLD AUTO: 4.04 MILLION/UL (ref 3.81–5.12)
RH BLD: POSITIVE
RPR SER QL: NORMAL
SAO2 % BLDCOV: 12.9 ML/DL
SPECIMEN EXPIRATION DATE: NORMAL
WBC # BLD AUTO: 9.38 THOUSAND/UL (ref 4.31–10.16)

## 2021-06-03 PROCEDURE — 86900 BLOOD TYPING SEROLOGIC ABO: CPT | Performed by: OBSTETRICS & GYNECOLOGY

## 2021-06-03 PROCEDURE — NC001 PR NO CHARGE: Performed by: OBSTETRICS & GYNECOLOGY

## 2021-06-03 PROCEDURE — 86592 SYPHILIS TEST NON-TREP QUAL: CPT | Performed by: OBSTETRICS & GYNECOLOGY

## 2021-06-03 PROCEDURE — 85025 COMPLETE CBC W/AUTO DIFF WBC: CPT | Performed by: OBSTETRICS & GYNECOLOGY

## 2021-06-03 PROCEDURE — 86850 RBC ANTIBODY SCREEN: CPT | Performed by: OBSTETRICS & GYNECOLOGY

## 2021-06-03 PROCEDURE — 4A1HXCZ MONITORING OF PRODUCTS OF CONCEPTION, CARDIAC RATE, EXTERNAL APPROACH: ICD-10-PCS | Performed by: OBSTETRICS & GYNECOLOGY

## 2021-06-03 PROCEDURE — 59514 CESAREAN DELIVERY ONLY: CPT | Performed by: OBSTETRICS & GYNECOLOGY

## 2021-06-03 PROCEDURE — 86901 BLOOD TYPING SEROLOGIC RH(D): CPT | Performed by: OBSTETRICS & GYNECOLOGY

## 2021-06-03 PROCEDURE — 82805 BLOOD GASES W/O2 SATURATION: CPT | Performed by: OBSTETRICS & GYNECOLOGY

## 2021-06-03 RX ORDER — SODIUM CHLORIDE, SODIUM LACTATE, POTASSIUM CHLORIDE, CALCIUM CHLORIDE 600; 310; 30; 20 MG/100ML; MG/100ML; MG/100ML; MG/100ML
75 INJECTION, SOLUTION INTRAVENOUS CONTINUOUS
Status: DISCONTINUED | OUTPATIENT
Start: 2021-06-03 | End: 2021-06-05 | Stop reason: HOSPADM

## 2021-06-03 RX ORDER — ONDANSETRON 2 MG/ML
4 INJECTION INTRAMUSCULAR; INTRAVENOUS EVERY 4 HOURS PRN
Status: ACTIVE | OUTPATIENT
Start: 2021-06-03 | End: 2021-06-04

## 2021-06-03 RX ORDER — DIAPER,BRIEF,INFANT-TODD,DISP
1 EACH MISCELLANEOUS DAILY PRN
Status: DISCONTINUED | OUTPATIENT
Start: 2021-06-03 | End: 2021-06-05 | Stop reason: HOSPADM

## 2021-06-03 RX ORDER — DEXAMETHASONE SODIUM PHOSPHATE 4 MG/ML
8 INJECTION, SOLUTION INTRA-ARTICULAR; INTRALESIONAL; INTRAMUSCULAR; INTRAVENOUS; SOFT TISSUE ONCE AS NEEDED
Status: ACTIVE | OUTPATIENT
Start: 2021-06-03 | End: 2021-06-04

## 2021-06-03 RX ORDER — BUPIVACAINE HYDROCHLORIDE 7.5 MG/ML
INJECTION, SOLUTION INTRASPINAL AS NEEDED
Status: DISCONTINUED | OUTPATIENT
Start: 2021-06-03 | End: 2021-06-03

## 2021-06-03 RX ORDER — ONDANSETRON 2 MG/ML
4 INJECTION INTRAMUSCULAR; INTRAVENOUS EVERY 8 HOURS PRN
Status: DISCONTINUED | OUTPATIENT
Start: 2021-06-03 | End: 2021-06-03

## 2021-06-03 RX ORDER — HYDROMORPHONE HCL/PF 1 MG/ML
0.4 SYRINGE (ML) INJECTION
Status: DISCONTINUED | OUTPATIENT
Start: 2021-06-03 | End: 2021-06-05 | Stop reason: HOSPADM

## 2021-06-03 RX ORDER — MORPHINE SULFATE 0.5 MG/ML
INJECTION, SOLUTION EPIDURAL; INTRATHECAL; INTRAVENOUS AS NEEDED
Status: DISCONTINUED | OUTPATIENT
Start: 2021-06-03 | End: 2021-06-03

## 2021-06-03 RX ORDER — DEXAMETHASONE SODIUM PHOSPHATE 4 MG/ML
INJECTION, SOLUTION INTRA-ARTICULAR; INTRALESIONAL; INTRAMUSCULAR; INTRAVENOUS; SOFT TISSUE AS NEEDED
Status: DISCONTINUED | OUTPATIENT
Start: 2021-06-03 | End: 2021-06-03

## 2021-06-03 RX ORDER — NALOXONE HYDROCHLORIDE 0.4 MG/ML
0.1 INJECTION, SOLUTION INTRAMUSCULAR; INTRAVENOUS; SUBCUTANEOUS
Status: ACTIVE | OUTPATIENT
Start: 2021-06-03 | End: 2021-06-04

## 2021-06-03 RX ORDER — SIMETHICONE 80 MG
80 TABLET,CHEWABLE ORAL 4 TIMES DAILY PRN
Status: DISCONTINUED | OUTPATIENT
Start: 2021-06-03 | End: 2021-06-05 | Stop reason: HOSPADM

## 2021-06-03 RX ORDER — CEFAZOLIN SODIUM 2 G/50ML
SOLUTION INTRAVENOUS AS NEEDED
Status: DISCONTINUED | OUTPATIENT
Start: 2021-06-03 | End: 2021-06-03

## 2021-06-03 RX ORDER — ACETAMINOPHEN 325 MG/1
650 TABLET ORAL EVERY 6 HOURS
Status: DISCONTINUED | OUTPATIENT
Start: 2021-06-03 | End: 2021-06-05 | Stop reason: HOSPADM

## 2021-06-03 RX ORDER — ONDANSETRON 2 MG/ML
INJECTION INTRAMUSCULAR; INTRAVENOUS AS NEEDED
Status: DISCONTINUED | OUTPATIENT
Start: 2021-06-03 | End: 2021-06-03

## 2021-06-03 RX ORDER — OXYCODONE HYDROCHLORIDE AND ACETAMINOPHEN 5; 325 MG/1; MG/1
2 TABLET ORAL EVERY 4 HOURS PRN
Status: DISCONTINUED | OUTPATIENT
Start: 2021-06-03 | End: 2021-06-05 | Stop reason: HOSPADM

## 2021-06-03 RX ORDER — HYDROMORPHONE HCL/PF 1 MG/ML
0.5 SYRINGE (ML) INJECTION EVERY 2 HOUR PRN
Status: DISCONTINUED | OUTPATIENT
Start: 2021-06-03 | End: 2021-06-05 | Stop reason: HOSPADM

## 2021-06-03 RX ORDER — OXYTOCIN/RINGER'S LACTATE 30/500 ML
62.5 PLASTIC BAG, INJECTION (ML) INTRAVENOUS ONCE
Status: COMPLETED | OUTPATIENT
Start: 2021-06-03 | End: 2021-06-03

## 2021-06-03 RX ORDER — IBUPROFEN 600 MG/1
600 TABLET ORAL EVERY 4 HOURS PRN
Status: DISCONTINUED | OUTPATIENT
Start: 2021-06-04 | End: 2021-06-05 | Stop reason: HOSPADM

## 2021-06-03 RX ORDER — CALCIUM CARBONATE 200(500)MG
1000 TABLET,CHEWABLE ORAL DAILY PRN
Status: DISCONTINUED | OUTPATIENT
Start: 2021-06-03 | End: 2021-06-05 | Stop reason: HOSPADM

## 2021-06-03 RX ORDER — EPHEDRINE SULFATE 50 MG/ML
INJECTION INTRAVENOUS AS NEEDED
Status: DISCONTINUED | OUTPATIENT
Start: 2021-06-03 | End: 2021-06-03

## 2021-06-03 RX ORDER — METOCLOPRAMIDE HYDROCHLORIDE 5 MG/ML
5 INJECTION INTRAMUSCULAR; INTRAVENOUS EVERY 6 HOURS PRN
Status: ACTIVE | OUTPATIENT
Start: 2021-06-03 | End: 2021-06-04

## 2021-06-03 RX ORDER — FENTANYL CITRATE/PF 50 MCG/ML
25 SYRINGE (ML) INJECTION
Status: DISCONTINUED | OUTPATIENT
Start: 2021-06-03 | End: 2021-06-05 | Stop reason: HOSPADM

## 2021-06-03 RX ORDER — FENTANYL CITRATE 50 UG/ML
INJECTION, SOLUTION INTRAMUSCULAR; INTRAVENOUS AS NEEDED
Status: DISCONTINUED | OUTPATIENT
Start: 2021-06-03 | End: 2021-06-03

## 2021-06-03 RX ORDER — NALBUPHINE HCL 10 MG/ML
5 AMPUL (ML) INJECTION
Status: DISCONTINUED | OUTPATIENT
Start: 2021-06-03 | End: 2021-06-05 | Stop reason: HOSPADM

## 2021-06-03 RX ORDER — KETOROLAC TROMETHAMINE 30 MG/ML
15 INJECTION, SOLUTION INTRAMUSCULAR; INTRAVENOUS EVERY 6 HOURS
Status: COMPLETED | OUTPATIENT
Start: 2021-06-03 | End: 2021-06-04

## 2021-06-03 RX ORDER — KETOROLAC TROMETHAMINE 30 MG/ML
INJECTION, SOLUTION INTRAMUSCULAR; INTRAVENOUS AS NEEDED
Status: DISCONTINUED | OUTPATIENT
Start: 2021-06-03 | End: 2021-06-03

## 2021-06-03 RX ORDER — DIPHENHYDRAMINE HCL 25 MG
25 TABLET ORAL EVERY 6 HOURS PRN
Status: DISCONTINUED | OUTPATIENT
Start: 2021-06-03 | End: 2021-06-05 | Stop reason: HOSPADM

## 2021-06-03 RX ORDER — ONDANSETRON 2 MG/ML
4 INJECTION INTRAMUSCULAR; INTRAVENOUS EVERY 8 HOURS PRN
Status: DISCONTINUED | OUTPATIENT
Start: 2021-06-04 | End: 2021-06-05 | Stop reason: HOSPADM

## 2021-06-03 RX ORDER — ACETAMINOPHEN 325 MG/1
650 TABLET ORAL EVERY 4 HOURS PRN
Status: DISCONTINUED | OUTPATIENT
Start: 2021-06-03 | End: 2021-06-05 | Stop reason: HOSPADM

## 2021-06-03 RX ORDER — SODIUM CHLORIDE, SODIUM LACTATE, POTASSIUM CHLORIDE, CALCIUM CHLORIDE 600; 310; 30; 20 MG/100ML; MG/100ML; MG/100ML; MG/100ML
125 INJECTION, SOLUTION INTRAVENOUS CONTINUOUS
Status: DISCONTINUED | OUTPATIENT
Start: 2021-06-03 | End: 2021-06-05 | Stop reason: HOSPADM

## 2021-06-03 RX ORDER — OXYCODONE HYDROCHLORIDE AND ACETAMINOPHEN 5; 325 MG/1; MG/1
1 TABLET ORAL EVERY 4 HOURS PRN
Status: DISCONTINUED | OUTPATIENT
Start: 2021-06-03 | End: 2021-06-05 | Stop reason: HOSPADM

## 2021-06-03 RX ORDER — DOCUSATE SODIUM 100 MG/1
100 CAPSULE, LIQUID FILLED ORAL 2 TIMES DAILY
Status: DISCONTINUED | OUTPATIENT
Start: 2021-06-03 | End: 2021-06-05 | Stop reason: HOSPADM

## 2021-06-03 RX ORDER — OXYTOCIN/RINGER'S LACTATE 30/500 ML
PLASTIC BAG, INJECTION (ML) INTRAVENOUS CONTINUOUS PRN
Status: DISCONTINUED | OUTPATIENT
Start: 2021-06-03 | End: 2021-06-03

## 2021-06-03 RX ORDER — CEFAZOLIN SODIUM 2 G/50ML
2000 SOLUTION INTRAVENOUS ONCE
Status: DISCONTINUED | OUTPATIENT
Start: 2021-06-03 | End: 2021-06-03

## 2021-06-03 RX ADMIN — DOCUSATE SODIUM 100 MG: 100 CAPSULE, LIQUID FILLED ORAL at 18:29

## 2021-06-03 RX ADMIN — SODIUM CHLORIDE, SODIUM LACTATE, POTASSIUM CHLORIDE, AND CALCIUM CHLORIDE 925 ML/HR: .6; .31; .03; .02 INJECTION, SOLUTION INTRAVENOUS at 23:45

## 2021-06-03 RX ADMIN — SODIUM CHLORIDE, SODIUM LACTATE, POTASSIUM CHLORIDE, AND CALCIUM CHLORIDE 125 ML/HR: .6; .31; .03; .02 INJECTION, SOLUTION INTRAVENOUS at 09:58

## 2021-06-03 RX ADMIN — CEFAZOLIN SODIUM 2000 MG: 2 SOLUTION INTRAVENOUS at 10:59

## 2021-06-03 RX ADMIN — ACETAMINOPHEN 650 MG: 325 TABLET, FILM COATED ORAL at 21:10

## 2021-06-03 RX ADMIN — KETOROLAC TROMETHAMINE 15 MG: 30 INJECTION, SOLUTION INTRAMUSCULAR at 11:37

## 2021-06-03 RX ADMIN — SODIUM CHLORIDE, SODIUM LACTATE, POTASSIUM CHLORIDE, AND CALCIUM CHLORIDE 1000 ML: .6; .31; .03; .02 INJECTION, SOLUTION INTRAVENOUS at 09:15

## 2021-06-03 RX ADMIN — KETOROLAC TROMETHAMINE 15 MG: 30 INJECTION, SOLUTION INTRAMUSCULAR at 18:29

## 2021-06-03 RX ADMIN — SODIUM CHLORIDE, SODIUM LACTATE, POTASSIUM CHLORIDE, AND CALCIUM CHLORIDE: .6; .31; .03; .02 INJECTION, SOLUTION INTRAVENOUS at 11:38

## 2021-06-03 RX ADMIN — Medication 62.5 MILLI-UNITS/MIN: at 13:25

## 2021-06-03 RX ADMIN — EPHEDRINE SULFATE 10 MG: 50 INJECTION, SOLUTION INTRAVENOUS at 11:56

## 2021-06-03 RX ADMIN — DEXAMETHASONE SODIUM PHOSPHATE 4 MG: 4 INJECTION INTRA-ARTICULAR; INTRALESIONAL; INTRAMUSCULAR; INTRAVENOUS; SOFT TISSUE at 11:20

## 2021-06-03 RX ADMIN — MORPHINE SULFATE 0.2 MG: 0.5 INJECTION, SOLUTION EPIDURAL; INTRATHECAL; INTRAVENOUS at 11:03

## 2021-06-03 RX ADMIN — FENTANYL CITRATE 15 MCG: 50 INJECTION, SOLUTION INTRAMUSCULAR; INTRAVENOUS at 11:03

## 2021-06-03 RX ADMIN — PHENYLEPHRINE HYDROCHLORIDE 50 MCG/MIN: 10 INJECTION INTRAVENOUS at 11:00

## 2021-06-03 RX ADMIN — Medication 250 MILLI-UNITS/MIN: at 11:25

## 2021-06-03 RX ADMIN — ONDANSETRON 4 MG: 2 INJECTION INTRAMUSCULAR; INTRAVENOUS at 11:20

## 2021-06-03 RX ADMIN — EPHEDRINE SULFATE 10 MG: 50 INJECTION, SOLUTION INTRAVENOUS at 11:50

## 2021-06-03 RX ADMIN — SODIUM CHLORIDE, SODIUM LACTATE, POTASSIUM CHLORIDE, AND CALCIUM CHLORIDE 125 ML/HR: .6; .31; .03; .02 INJECTION, SOLUTION INTRAVENOUS at 13:45

## 2021-06-03 RX ADMIN — ACETAMINOPHEN 650 MG: 325 TABLET, FILM COATED ORAL at 14:58

## 2021-06-03 RX ADMIN — BUPIVACAINE HYDROCHLORIDE IN DEXTROSE 1.6 ML: 7.5 INJECTION, SOLUTION SUBARACHNOID at 11:03

## 2021-06-03 NOTE — PLAN OF CARE
Problem: BIRTH - VAGINAL/ SECTION  Goal: Fetal and maternal status remain reassuring during the birth process  Description: INTERVENTIONS:  - Monitor vital signs  - Monitor fetal heart rate  - Monitor uterine activity  - Monitor labor progression (vaginal delivery)  - DVT prophylaxis  - Antibiotic prophylaxis  Outcome: Completed  Goal: Emotionally satisfying birthing experience for mother/fetus  Description: Interventions:  - Assess, plan, implement and evaluate the nursing care given to the patient in labor  - Advocate the philosophy that each childbirth experience is a unique experience and support the family's chosen level of involvement and control during the labor process   - Actively participate in both the patient's and family's teaching of the birth process  - Consider cultural, Roman [employer] and age-specific factors and plan care for the patient in labor  Outcome: Completed

## 2021-06-03 NOTE — ANESTHESIA PROCEDURE NOTES
Spinal Block    Patient location during procedure: OR  Start time: 6/3/2021 11:03 AM  Reason for block: procedure for pain and at surgeon's request  Staffing  Anesthesiologist: Tammi Garcia MD  Resident/CRNA: Padmini Gan CRNA  Performed: anesthesiologist   Preanesthetic Checklist  Completed: patient identified, site marked, surgical consent, pre-op evaluation, timeout performed, IV checked, risks and benefits discussed and monitors and equipment checked  Spinal Block  Patient position: sitting  Prep: ChloraPrep  Patient monitoring: cardiac monitor and frequent blood pressure checks  Approach: right paramedian  Location: L3-4  Injection technique: single-shot  Needle  Needle type: pencil-tip   Needle gauge: 25 G  Needle length: 10 cm  Assessment  Sensory level: T4  Injection Assessment:  negative aspiration for heme, no paresthesia on injection and positive aspiration for clear CSF    Post-procedure:  site cleaned

## 2021-06-03 NOTE — H&P
H&P Exam - Obstetrics   Yaya Cerna 30 y o  female MRN: [id number]  Unit/Bed#: LD PACU-02 Encounter: [id number]    Assessment/Plan     Assessment:  29 Yo P2 @ 39w1d with 2 prior C-sections for repeat   History of abnormal EKG  Plan:  Repeat   Consult with Anesthesia regarding abnormal EKG history  History of Present Illness   Chief Complaint: Elective     HPI:  Yaya Cerna is a 30 y o   female with an CAMERON of 2021, by Ultrasound at 39w1d weeks gestation who is being admitted for Elective   Her current obstetrical history is significant for prior   Contractions: None  Leakage of fluid: None  Bleeding: None  Fetal movement: present  Pregnancy complications: chlymydia  Review of Systems   Constitutional: Negative for fever  Respiratory: Negative for shortness of breath  Cardiovascular: Negative for chest pain  Gastrointestinal: Negative for abdominal pain  Positive FM  Negative: LOF or VB  Historical Information   OB History    Para Term  AB Living   4 2 2   1 2   SAB TAB Ectopic Multiple Live Births   1       2      # Outcome Date GA Lbr Vick/2nd Weight Sex Delivery Anes PTL Lv   4 Current            3 Term //15 40w0d  3402 g (7 lb 8 oz) M CS-LTranv   RAHEL   2 Term / 40w0d  3856 g (8 lb 8 oz) M CS-Unspec   RAHEL   1 SAB               Obstetric Comments   Menstrual cycle: 14     Baby complications/comments: None  No past medical history on file    Past Surgical History:   Procedure Laterality Date     SECTION      x2     Social History   Social History     Substance and Sexual Activity   Alcohol Use Not Currently    Frequency: Never    Binge frequency: Never     Social History     Substance and Sexual Activity   Drug Use Not Currently     Social History     Tobacco Use   Smoking Status Never Smoker   Smokeless Tobacco Never Used     E-Cigarette/Vaping    E-Cigarette Use Never User E-Cigarette/Vaping Substances    Nicotine No     THC No     CBD No     Flavoring No     Other No     Unknown No      Family History: non-contributory    Meds/Allergies   all medications and allergies reviewed  No Known Allergies    Objective   Vitals: There were no vitals taken for this visit  There is no height or weight on file to calculate BMI  Invasive Devices     None                 Physical Exam  Constitutional:       Appearance: Normal appearance  HENT:      Head: Normocephalic  Cardiovascular:      Rate and Rhythm: Normal rate and regular rhythm  Pulmonary:      Effort: Pulmonary effort is normal  No respiratory distress  Breath sounds: Normal breath sounds  No stridor  No wheezing or rales  Abdominal:      Palpations: Abdomen is soft  Skin:     General: Skin is warm and dry  Neurological:      Mental Status: She is alert  Psychiatric:         Mood and Affect: Mood normal          Behavior: Behavior normal      This procedure has been reviewed, including risks and benefits, with the patient and written informed consent has been obtained  Prenatal Labs: I have personally reviewed pertinent reports  Imaging, EKG, Pathology, and Other Studies: I have personally reviewed pertinent reports

## 2021-06-03 NOTE — DISCHARGE INSTRUCTIONS
Cuidado personal después del parto   CUIDADO AMBULATORIO:   El periodo postparto es el periodo de tiempo desde el momento de cristian a roger hasta por lo menos 6 semanas  Samantha gabriela tiempo usted puede experimentar muchos cambios físicos ailyn emocionales  Es muy importante entender que es lo normal y cuándo es necesario llamar a suazo médico  También es importante saber cómo cuidarse a sí misma samantha gabriela periodo  Llame al Janie Heart de emergencias local (911 en los [place]) en cualquiera de los siguientes casos:  · Usted ve o escucha cosas que no existen o tiene pensamientos de [place] daño a sí misma o de lastimar a suazo bebé  · Usted empapa 1 toalla higiénica en 15 minutos, tiene visión borrosa, piel húmeda o pálida y siente que se va a desmayar  · Usted se desmaya o pierde el conocimiento  · Usted tiene dificultad para respirar  · Usted expectora maricarmen  · Se abre la incisión de la cesárea  Busque atención médica de inmediato si:  · Suazo corazón está latiendo más rápido de lo normal     · Usted tiene un adam dolor de tamanna o cambios en suazo visión  · La incisión de la episiotomía o de la cesárea está dong, inflamada, sangrando o supurando pus  · Usted tiene dolor abdominal intenso  Llame a suazo médico u obstetra si:  · La pierna está [place] y [place] osmany de lo normal     · Usted empapa 1 o más toallas higiénicas en virginia hora, o de suazo vagina le salen unos coágulos de maricarmen más grandes que virginia moneda de [address]  · Tiene fiebre  · Usted le comienza o se le empeora el dolor en suazo abdomen o vagina  · Usted sigue con depresión 1 a 2 semanas después del parto  · Usted tiene dificultad para dormir  · Usted tiene flujo vaginal con mal olor  · Usted tiene dolor o ardor al orinar  · Usted no tiene evacuaciones intestinales por 3 días o más  · Tiene náuseas o está vomitando  · Usted tiene unos bultos duros o jose cruz andreson por encima de coretta senos      · Usted tiene los pezones cuarteados o le están sangrando  · Usted tiene preguntas o inquietudes acerca de suazo condición o cuidado  Cambios físicos: A continuación están los cambios normales después de cristian a roger:  · Dolor en el área entre el ano y la vagina    · Dolor en el pecho    · Estreñimiento o hemorroides    · Golpes de calor o frío    · Sangrado o secreción vaginal    · Cólicos abdominales de leves a moderados    · Dificultad para controlar las deposiciones o la orina    Cambios emocionales: Courtney caída en los niveles hormonales después del parto puede provocar cambios en coretta emociones  Puede que usted se sienta irritable, amber o ansioso  Es probable que llore fácilmente o sin ninguna razón  Puede que también se sienta deprimido  La depresión que continúa puede ser un signo de depresión posparto, un trastorno que puede ser tratado  El tratamiento puede incluir terapia conversacional, medicamentos o [place]  Los [employer] preguntarán cómo se siente y si tiene algún indicador de depresión  Estas conversaciones pueden tener lugar samantha las citas de suazo Jaun Ashli y del cuidado de suazo bebé, así ailyn también samantha las visitas al [employer]  Los proveedores pueden ayudarlo a encontrar maneras de cuidarse a sí misma y a suazo bebé  Hable con coretta proveedores acerca de lo siguiente:  · Cuándo comenzaron los cambios emocionales o la depresión, y si está empeorando con el tiempo    · Problemas con las actividades diarias, el sueño o el cuidado de suazo bebé    · Si algo lo hace sentir peor o lo hace sentir mejor    · Sentir que no se está vinculando con suazo bebé de la manera que usted desea    · Cualquier problema que suazo bebé tenga para dormir o alimentarse    · Suazo bebé está quisquilloso o llora mucho    · Apoyo que usted tiene de amigos, familiares u otros    Cuidado de los senos para mamás lactantes: Usted puede tener los senos adoloridos samantha 3 a 6 días después de cristian a roger   [place] sucede a medida que la [place] a llenar coretta senos  Es posible que también tenga los senos adoloridos en harish que usted no esté dando de lactar a suazo bebé con frecuencia  Evans lo siguiente para cuidar de coretta senos:  · Aplique virginia compresa húmeda y tibia en coretta senos según las indicaciones  [place] puede servir para calmar el dolor en coretta senos  Asegúrese que el paño no esté muy caliente antes de colocarlo en suazo pecho  · Alimente al bebé o sáquese leche con frecuencia  [place] puede prevenir la congestión de los conductos de [place]  Pregunte a suazo médico con qué frecuencia debe alimentar a suazo bebé o sacarse leche  · Dese masajes en los senos según las indicaciones  [place] puede ayudar a aumentar el flujo de [place]  [place] con suavidad de forma circular los senos antes de la lactancia  Es posible que necesite apretar con suavidad suazo pecho o pezón para ayudar a que salga la leche  Usted también puede usar un extractor de [place] para ayudar a liberar la leche de coretta senos  · Lávese los senos sólo con agua tibia  No use jabón en coretta pezones  El jabón puede [employer]  · Aplique crema de lanolina en coretta pezones según las indicaciones  La crema de lanonila puede servir para humectar suazo piel y evitar que los pezones se resequen  Siempre  debe quitarse la crema de lanolina con agua tibia antes de darle pecho a suazo bebé  · Use protectores para la lactancia en suazo brasier o sostén  La [employer] salir de coretta pezones cuando usted no está dando de lactar  Usted se puede colocar los protectores dentro de suazo brasier para evitar que la [place] se traspase a suazo ropa  Pregunte a suazo médico sobre donde puede [employer] protectores para lactancia  · Solicite asistencia para la lactancia materna si lo necesita  Los médicos pueden contestar las preguntas sobre la lactancia y brindarle apoyo  Pregunte a suazo médico con quién puede asesorarse si necesita asistencia con la lactancia      Cuidado de los senos para mamás no lactantes: La leche materna llenará coretta pechos incluso si usted alimenta a suazo bebé con leche de formula  A continuación unas cosas que puede hacer que ayudan a que deje de producir [place] y que coretta senos se llenen y le provoque dolor:  · Use un sostén o brasier de soporte en todo momento  Un brasier deportivo o un sostén Michelle Riley pueden ayudar a suspender la producción de [place] [place]  · Aplique hielo en cada seno por 15 a 20 minutos cada hora según las indicaciones  Use virginia compresa de hielo o ponga hielo triturado en virginia bolsa de plástico  [place] con virginia toalla antes de aplicarla sobre la mama  El hielo ayuda a encoger los conductos de [place]  · Evite que le caiga agua tibia en coretta senos  El agua tibia hará que sea más fácil que la leche llene coretta pechos  En la ducha póngase de espaldas al agua  · Limite la cantidad de tiempo que toca coretta senos  [place] evitará que los senos se llenen de [place] [place]  Cuidado del perineo: El perineo es el área entre el recto y la vagina  Es normal tener inflamación y dolor en esta área después de cristian a roger  Si a usted le hicieron virginia episiotomía, suazo médico le proporcionará instrucciones especiales  · Limpie el perineo después de ir al baño  [place] puede prevenir virginia infección y [de-identified] para la cicatrización  Use virginia botella de agua tibia con atomizador para limpiar el perineo  También puede rociar suavemente agua tibia contra el perineo mientras orina  Siempre debe limpiarse de adelante hacia atrás  · [facility]  Un baño de asiento puede servir para aliviar la inflamación y el dolor  Llene la trell o un recipiente con agua hasta que [place] coretta caderas y siéntese en el agua  Use agua fría por 2 días después del parto  Luego use agua tibia  Pregunte a suazo médico por más [employer] kely de [place]  · Aplique compresas de hielo por las primeras 24 horas o [address] indicaciones  Use un guante médico y llénelo con hielo o compre compresas de hielo   Envuelva la compresa de hielo o el guante en [place] toalla o paño pequeño  Coloque la compresa de hielo en el perineo por 20 minutos cada vez  · Siéntese en un cojín en forma de barbie  [place] puede ayudar [employer] presión que se ejerce en suazo perineo cuando se sienta  · Use toallitas que contienen medicamento o tome las píldoras según las indicaciones  Suazo médico puede indicarle que use toallitas de hamamelis  Usted puede colocar las toallitas de hamamelis en el refrigerador antes de aplicarlas en el perineo  Suazo médico también le puede indicar que tome Valerie  Pregúntele con qué frecuencia [address] [place] [address]  · No vaya a nadar ni tome kely en trell por 4 a 6 semanas o según las indicaciones  [place] servirá para evitar virginia infección en suazo útero o vagina  El cuidado intestinal y de la vejiga: Benjaman Cobia puede sudheer entre 3 a 5 días para tener virginia evacuación intestinal después de cristian a roger a suazo bebé  Usted puede hacer lo siguiente para prevenir o controlar el estreñimiento y tener el control de coretta intestinos o vejiga:  · [facility] indicaciones  Un ablandador fecal es un medicamento que hará que coretta evacuaciones intestinales paramjit más suaves  [place] puede prevenir o aliviar el estreñimiento  Un ablandador fecal además puede hacer que la evacuación intestinal duela menos  · [employer] suficiente líquidos  Pregunte cuánto líquido debe sudheer cada día y cuáles líquidos son los más adecuados para usted  Los líquidos pueden ayudar a prevenir el estreñimiento  · [place] alimentos ricos en fibras  Un[address] frutas, verduras, granos, frijoles y lentejas  Pregunte a suazo médico cuál es la cantidad de fibra que necesita al día  La fibra puede prevenir el estreñimiento  · [place] los ejercicios de [place] según las indicaciones  Los ejercicios de [place] sirven para fortalecer los músculos que [employer] movimientos intestinales y la [place]  [place] a suazo médico más [employer] ejercicios de [place]eg[place]      · Aplique virginia compresa o medicamento para las hemorroides según las indicaciones  [place] puede aliviar la inflamación y el dolor  Suazo médico puede indicarle que use hielo o toallitas que contienen medicamentos para las hemorroides  También puede indicarle que se damaso un baño de asiento  Pregunte a suazo médico por mayor información sobre cómo controlar las hemorroides  Nutrición: Phan Gianotti buena nutrición es importante en el periodo posparto  La nutrición ayuda a que usted regrese a suazo peso saludable, aumenta el nivel de energía y fantasma el estreñimiento  También sirve para que [employer] suficientes nutrientes y calorías si usted va a alimentar a suazo jolynn con [place]  · Consuma alimentos saludables y variados  Los alimentos saludables incluyen frutas, verduras, pan integral, productos lácteos bajos en grasa, frijoles, raimundo magras y pescado  Usted puede [employer] 500 a 700 calorías adicionales al día si usted está dando de lactar a suazo bebé  Puede que también necesite añadir proteína  · Limite los alimentos con azúcar añadida y grandes cantidades de [place]  [place] alimentos son altos en calorías y bajos en nutrientes saludables  Jena las etiquetas de los alimentos para que sepa cuál es la cantidad de azúcar y grasas que contiene los alimentos que quiere comer  · [employer] 8 a 10 vasos de agua al día  El agua le ayudará a producir suficiente leche para suazo bebé  También le ayudará a prevenir el estreñimiento  [place] un vaso de agua cada vez que amamanta a suazo bebé  · [address] [place]  Pregunte a suazo médico cuáles vitaminas necesita  · Limite la cafeína y el alcohol si usted está alimentando a suazo bebé con [place]  Carlos Laming y el alcohol pueden pasar a la [place]  Carlos Laming y el alcohol pueden hacer que suazo bebé esté molesto  [place] también interfiere con el sueño de suazo bebé  Pregunte a suazo médico si usted puede sudheer alcohol o cafeína      Descanse y duerma: Usted se puede sentir [address] [facility] cansada en el periodo posparto  Dormir lo suficiente le ayudará a recuperarse y tener la energía para cuidar de suazo bebé  Los siguientes pueden ayudarle a dormir y descansar:  · Duerma cuando suazo bebé esté tomando la siesta  Suazo bebé puede sudheer varias siestas samantha el día  Descanse samantha [employer]  · [address]  Muchas personas quieren venir a visitarla a usted y conocer al bebé  Pídales a coretta amigos y familiares que la visiten en diferentes días  [place] le dará tiempo para descansar  · No planee demasiadas cosas en un día  Deje los quehaceres de la casa para que tenga tiempo para descansar  Poco a poca damaso más cada día  · Solicite ayuda de familiares, amigos y vecinos  Pida que le ayuden a ally la ropa, a limpiar o hacer mandados  También pregunte que alguien le cuide suazo bebé mientras yovany virginia siesta o se relaja  [place] a suazo moris que la ayude con el cuidado del bebé  Sáquese leche para que suazo moris pueda alimentar a suazo bebé por la noche  Ejercicios después del parto: Espere hasta que suazo médico la autorice a hacer ejercicio  El ejercicio puede ayudarla adelgazar, aumentar suazo niveles de energía y controlar suazo estado de ánimo  También puede prevenir el estreñimiento y los coágulos  Emp[place] con un ejercicio leve ailyn caminar  [employer] a medida que tenga [place]  Es posible que necesite evitar hacer ejercicios para el abdomen por 1 a 2 semanas después del parto  Hable con suazo médico sobre un plan de ejercicios que sea adecuado para usted  Actividad sexual después del parto:  · No tenga relaciones sexuales hasta que suazo médico lo autorice  Es posible que necesite esperar de 4 a 6 semanas antes de [employer] relación sexual  [place] puede evitar que contraiga virginia infección y darle tiempo para recuperarse  · Suazo ciclo menstrual puede comenzar tan pronto ailyn en 3 semanas después de cristian a roger  Suazo período puede demorarse si usted está dando de lactar a suazo bebé   Usted puede Jacob Jonathan antes de que le venga suazo primer período posparto  Consulte con suazo médico sobre los anticonceptivos que son los adecuados para usted  Algunos tipos de anticonceptivos no son Tristen Saline  Para [de-identified] y más información: Participe en un regulo de apoyo para madres primerizas  Pídales ayuda a familiares y [facility] con los [address] de la casa, Halie Chávezalhermelindo y el cuidado de suazo bebé  · Office of Women's Health,  Department of Health and Human Services  [address], [address] [facility] , [facility] [address]  [address], [address] [place] , [facility] [address]  Phone: [phone]  Web Address: [employer] womenshealth gov  · [facility] [address] [place] [address] , [address] [place] [address] , [address] [place] [address]  Web Address: Seesaw be  [email or web address]  Acuda a coretta consultas de control con suazo médico u obstetra según le indicaron: Usted tendrá que hacer un seguimiento en el transcurso de las 2 a 6 semanas posteriores al [employer]  Escriba las preguntas que tenga para que recuerde hacerlas samantha coretta citas  © Copyright [facility] [address] Information is for End User's use only and may not be sold, redistributed or otherwise used for commercial purposes  All illustrations and images included in CareNotes® are the copyrighted property of A D A M , Inc  or [address] [place] [address] es sólo para uso en educación  Suazo intención no es darle un consejo médico sobre enfermedades o tratamientos  Colsulte con suazo Jacob Pinta farmacéutico antes de seguir cualquier régimen médico para saber si es seguro y efectivo para usted

## 2021-06-03 NOTE — PLAN OF CARE
Problem: POSTPARTUM  Goal: Experiences normal postpartum course  Description: INTERVENTIONS:  - Monitor maternal vital signs  - Assess uterine involution and lochia  Outcome: Progressing  Goal: Appropriate maternal -  bonding  Description: INTERVENTIONS:  - Identify family support  - Assess for appropriate maternal/infant bonding   -Encourage maternal/infant bonding opportunities  - Referral to  or  as needed  Outcome: Progressing  Goal: Establishment of infant feeding pattern  Description: INTERVENTIONS:  - Assess breast/bottle feeding  - Refer to lactation as needed  Outcome: Progressing  Goal: Incision(s), wounds(s) or drain site(s) healing without S/S of infection  Description: INTERVENTIONS  - Assess and document risk factors for skin impairment   - Assess and document dressing, incision, wound bed, drain sites and surrounding tissue  - Consider nutrition services referral as needed  - Oral mucous membranes remain intact  - Provide patient/ family education  Outcome: Progressing     Problem: PAIN - ADULT  Goal: Verbalizes/displays adequate comfort level or baseline comfort level  Description: Interventions:  - Encourage patient to monitor pain and request assistance  - Assess pain using appropriate pain scale  - Administer analgesics based on type and severity of pain and evaluate response  - Implement non-pharmacological measures as appropriate and evaluate response  - Consider cultural and social influences on pain and pain management  - Notify physician/advanced practitioner if interventions unsuccessful or patient reports new pain  Outcome: Progressing     Problem: INFECTION - ADULT  Goal: Absence or prevention of progression during hospitalization  Description: INTERVENTIONS:  - Assess and monitor for signs and symptoms of infection  - Monitor lab/diagnostic results  - Monitor all insertion sites, i e  indwelling lines, tubes, and drains  - Monitor endotracheal if appropriate and nasal secretions for changes in amount and color  - [place] appropriate cooling/warming therapies per order  - Administer medications as ordered  - Instruct and encourage patient and family to use good hand hygiene technique  - Identify and instruct in appropriate isolation precautions for identified infection/condition  Outcome: Progressing  Goal: Absence of fever/infection during neutropenic period  Description: INTERVENTIONS:  - Monitor WBC    Outcome: Progressing     Problem: SAFETY ADULT  Goal: Patient will remain free of falls  Description: INTERVENTIONS:  - Assess patient frequently for physical needs  -  Identify cognitive and physical deficits and behaviors that affect risk of falls    -  [place] fall precautions as indicated by assessment   - Educate patient/family on patient safety including physical limitations  - Instruct patient to call for assistance with activity based on assessment  - Modify environment to reduce risk of injury  - Consider OT/PT consult to assist with strengthening/mobility  Outcome: Progressing  Goal: Maintain or return to baseline ADL function  Description: INTERVENTIONS:  -  Assess patient's ability to carry out ADLs; assess patient's baseline for ADL function and identify physical deficits which impact ability to perform ADLs (bathing, care of mouth/teeth, toileting, grooming, dressing, etc )  - Assess/evaluate cause of self-care deficits   - Assess range of motion  - Assess patient's mobility; develop plan if impaired  - Assess patient's need for assistive devices and provide as appropriate  - Encourage maximum independence but intervene and supervise when necessary  - Involve family in performance of ADLs  - Assess for home care needs following discharge   - Consider OT consult to assist with ADL evaluation and planning for discharge  - Provide patient education as appropriate  Outcome: Progressing  Goal: Maintain or return mobility status to optimal level  Description: INTERVENTIONS:  - Assess patient's baseline mobility status (ambulation, transfers, stairs, etc )    - Identify cognitive and physical deficits and behaviors that affect mobility  - Identify mobility aids required to assist with transfers and/or ambulation (gait belt, sit-to-stand, lift, walker, cane, etc )  - [place] fall precautions as indicated by assessment  - Record patient progress and toleration of activity level on Mobility SBAR; progress patient to next Phase/Stage  - Instruct patient to call for assistance with activity based on assessment  - Consider rehabilitation consult to assist with strengthening/weightbearing, etc   Outcome: Progressing     Problem: Knowledge Deficit  Goal: Patient/family/caregiver demonstrates understanding of disease process, treatment plan, medications, and discharge instructions  Description: Complete learning assessment and assess knowledge base    Interventions:  - Provide teaching at level of understanding  - Provide teaching via preferred learning methods  Outcome: Progressing     Problem: DISCHARGE PLANNING  Goal: Discharge to home or other facility with appropriate resources  Description: INTERVENTIONS:  - Identify barriers to discharge w/patient and caregiver  - Arrange for needed discharge resources and transportation as appropriate  - Identify discharge learning needs (meds, wound care, etc )  - Arrange for interpretive services to assist at discharge as needed  - Refer to Case Management Department for coordinating discharge planning if the patient needs post-hospital services based on physician/advanced practitioner order or complex needs related to functional status, cognitive ability, or social support system  Outcome: Progressing     Problem: ALTERATION IN THE BREAST  Goal: Optimize infant feeding at the breast  Description: INTERVENTIONS:  - Latch, breast and nipple assessment  - Assess prior breast feeding history  - Hand expression of breast milk  - For cracked, bleeding and or sore nipples reassess latch, treat damaged nipple  -Educate mother on feeding cues  -Positioning/latch techniques  Outcome: Progressing     Problem: INADEQUATE LATCH, SUCK OR SWALLOW  Goal: Demonstrate ability to latch and sustain latch, audible swallowing and satiety  Description: INTERVENTIONS:  - Assess oral anatomy, notify Physician/AP for abnormal findings  - Establish milk expression  - Maximize feeding opportunity (skin to skin, behavioral state)  - Position/latch techniques  - Discourage use of pacifier-artificial nipple  - Mechanical pumping  - Nipple Shield  - Supplemental formula feeding (Physician/AP order)  - Alternative feeding method  Outcome: Progressing

## 2021-06-03 NOTE — DISCHARGE SUMMARY
Discharge Summary - OB/GYN   Milinda Dancer 30 y o  female MRN: [id number]  Unit/Bed#: -01 Encounter: [id number]      Admission Date: 6/3/2021     Discharge Date: 21    Admitting Diagnosis:   1  Pregnancy at 39w1d  2  History of prior  section    Discharge Diagnosis:   Same, delivered    Procedures: repeat  section, low transverse incision    Delivering Attending: Kristopher Worley MD  Discharge Attending: Dr Ayana Marques Course:     Milinda Dancer is a 30 y o  [id number] at 39w1d wks who was initially admitted for repeat  section  She delivered a viable female  on 6/3/21 at 1125  Weight 8lbs 0oz via repeat  section, low transverse incision  Apgars were 9 (1 min) and 9 (5 min)   was transferred to  nursery  Patient tolerated the procedure well and was transferred to recovery in stable condition  Her post-operative course was uncomplicated  Preoperative hemaglobin was 11 2, postoperative was 8 9  Her postoperative pain was well controlled with oral analgesics  On day of discharge, she was ambulating and able to reasonably perform all ADLs  She was voiding and had appropriate bowel function  Pain was well controlled  She was discharged home on post-operative day #2 without complications  Patient was instructed to follow up with her OB as an outpatient and was given appropriate warnings to call provider if she develops signs of infection or uncontrolled pain  Complications: none apparent    Condition at discharge: good     Discharge instructions/Information to patient and family:   See after visit summary for information provided to patient and family  Provisions for Follow-Up Care:  See after visit summary for information related to follow-up care and any pertinent home health orders  Disposition: Home    Planned Readmission: No    Discharge Medications:   For a complete list of the patient's medications, please refer to her med rec

## 2021-06-03 NOTE — INTERIM OP NOTE
SECTION () REPEAT  Postoperative Note  PATIENT NAME: Nikki Lombard  : 1995  MRN: [id number]  [facility] L&D OR ROOM 02    Surgery Date: 6/3/2021    Preop Diagnosis:  Previous  section complicating pregnancy [K76 987]    Post-Op Diagnosis Codes:     * Previous  section complicating pregnancy [W31 757]    Procedure(s) (LRB):   SECTION () REPEAT (N/A)    Surgeon(s) and Role:     * Vivian Talamantes MD - Primary     * Kristy Ley MD - Assisting    Specimens:  ID Type Source Tests Collected by Time Destination   A : cord gases Cord Blood Cord BLOOD GAS, VENOUS, CORD, BLOOD GAS, ARTERIAL, CORD Vivian Talamantes MD 6/3/2021 1126    B : placenta for storage Tissue (Placenta on Hold) OB Only Placenta PLACENTA IN STORAGE Vivian Talamantes MD 6/3/2021 1127        Estimated Blood Loss:   800 ml    Anesthesia Type:   Spinal    Findings:   Viable female  with spontaneous cry, good tone, good color  Normal uterus, tubes, ovaries      Complications:   None    SIGNATURE: Vivian Talamantes MD   DATE: Zayra 3, 2021   TIME: 12:09 PM

## 2021-06-03 NOTE — PLAN OF CARE

## 2021-06-03 NOTE — OP NOTE
OPERATIVE REPORT  PATIENT NAME: Fredi Peters    :  1995  MRN: [id number]  Pt Location: [facility] L[facility] OR ROOM 02    SURGERY DATE: 6/3/2021    Surgeon(s) and Role:     * Michelle Wallace MD - Primary     * Theo Ramirez MD - Assisting    Preop Diagnosis:  Previous  section complicating pregnancy [R32 918]    Post-Op Diagnosis Codes:     * Previous  section complicating pregnancy [X47 109]    Procedure(s) (LRB):   SECTION () REPEAT (N/A)    Specimen(s):  ID Type Source Tests Collected by Time Destination   A : cord gases Cord Blood Cord BLOOD GAS, VENOUS, CORD, BLOOD GAS, ARTERIAL, CORD Michelle Wallace MD 6/3/2021 1126    B : placenta for storage Tissue (Placenta on Hold) OB Only Placenta PLACENTA IN STORAGE Michelle Wallace MD 6/3/2021 1127        Quantitative Blood Loss:   384ml    Drains:  Urethral Catheter Non-latex 16 Fr  (Active)   Reasons to continue Urinary Catheter  Post-operative urological requirements 21 1107   Goal for Removal Remove POD#1 21 1107   Site Assessment Clean;Skin intact 21 1107   Collection Container Standard drainage bag 21 1107   Securement Method Securing device (Describe) 21 1107   Number of days: 0       Anesthesia Type:   * Spinal Anesthesia *    Operative Indications:  Previous  section complicating pregnancy [X07 948]    Operative Findings:  Maternal Findings:  Normal uterus  Normal tubes and ovaries bilaterally  No adhesions  No difficulty noted from skin to delivery     Findings:  Viable female weighing 8lbs 0oz;  Apgar scores of 9 at one minute and 9 at five minutes     Clear amniotic fluid  Normal placenta with 3-vessel cord    Arterial and Venous Gases:  Umbilical Cord Venous Blood Gas:  Results from last 7 days   Lab Units 21  1126   PH COV  7 347   PCO2 COV mm HG 43 1*   HCO3 COV mmol/L 23 1   BASE EXC COV mmol/L -2 6*   O2 CT CD VB mL/dL 12 9   O2 HGB, VENOUS CORD % 64 6 Umbilical Cord Arterial Blood Gas:  Results from last 7 days   Lab Units 21  1126   PH COA  7 338   PCO2 COA  46 3   PO2 COA mm HG 21 8   HCO3 COA mmol/L 24 3   BASE EXC COA mmol/L -1 8*   O2 CONTENT CORD ART ml/dl 9 1   O2 HGB, ARTERIAL CORD % 39 5       Complications:   None  Procedure Details   The patient was seen prior to the procedure  Risks, benefits, possible complications, alternate treatment options, and expected outcomes were discussed with the patient  The patient agreed with the proposed plan and gave informed consent for a repeat   The patient was taken to the [facility] Operating Room at [phone] where she received spinal anesthesia  For infection prophylaxis, she received 2g ancef  preoperatively  Fetal heart tones in the OR were assessed and noted to be within normal limits and a Will catheter and SCDs were placed  The abdomen was prepped with Chloraprep, the vagina was prepped with Betadine, and following appropriate drying time, the patient was draped in the usual sterile manner  A Time Out was held and the above information confirmed  The patient was identified as Yaya Cerna and the procedure verified as a  Delivery for hx of   A Pfannenstiel incision was made and carried down through the underlying subcutaneous tissue to the fascia using a scalpel  The rectus fascia was then nicked in the midline and dissected laterally using Rivas scissors  The superior edge of the  fascial incision was grasped with Kocher clamps bilaterally, tented upward and the underlying rectus muscles were dissected off sharply with Rivas scissors  This was repeated on the inferior edge of the fascia and dissected down to the pubic rami  The rectus muscles were  and the peritoneum was identified, entered, and extended longitudinally with blunt dissection  The [place] was inserted    A low transverse uterine incision was made with the scalpel and extended laterally with blunt dissection  The amnion was entered bluntly  The fetal head was palpated, elevated, and delivered through the uterine incision followed by the body without difficulty  Time of birth was noted at [phone]  There was noted to be spontaneous cry and good tone  There was no apparent injury to the   The umbilical cord was doubly clamped and cut after 30 seconds to allow for delayed cord clamping  The infant was handed off to the  providers  Arterial and venous cord gases, cord blood, and a segment of umbilical cord were obtained for evaluation  The placenta delivered spontaneously at 1127 with uterine fundal massage and appeared normal  The uterus was cleaned out with a moist lap sponge  The uterine incision was closed with a running locked suture of 0 Monocryl  A second layer of the same suture was used to imbricate the first   Hemostasis was noted to be excellent  Normal saline solution was used to irrigate the posterior culdesac  The uterus was returned to the abdomen  The paracolic gutters were inspected and cleared of all clots and debris with irrigation and moist lap sponges  The Kostas retractor was removed  The fascia was closed with a running suture of 0 Vicryl  Subcutaneous adipose tissue was closed with a running suture of 2-0 Plain gut  The skin was closed with a subcuticular running suture of 4-0 Monocryl  Benzoin and steri-strip dressings was applied  The patient appeared to tolerate the procedure very well  Lap sponge, needle, and instrument counts were correct x2  The patient's fundus was palpated and the uterus was expressed  She was then cleaned and transferred to her postpartum recovery room in stable condition and her infant went to the  nursery  Patient Disposition:  PACU       Attending Attestation: Dr Nora Ambriz MD was present for the entire procedure    Rolando Godwin MD  OB/GYN PGY-2  6/3/2021 1:25 PM        SIGNATURE: Jj Foley MD  DATE: Zayra 3, 2021  TIME: 1:25 PM

## 2021-06-04 LAB
BASOPHILS # BLD AUTO: 0.03 THOUSANDS/ΜL (ref 0–0.1)
BASOPHILS NFR BLD AUTO: 0 % (ref 0–1)
EOSINOPHIL # BLD AUTO: 0.05 THOUSAND/ΜL (ref 0–0.61)
EOSINOPHIL NFR BLD AUTO: 1 % (ref 0–6)
ERYTHROCYTE [DISTWIDTH] IN BLOOD BY AUTOMATED COUNT: 12.8 % (ref 11.6–15.1)
HCT VFR BLD AUTO: 27.3 % (ref 34.8–46.1)
HGB BLD-MCNC: 8.9 G/DL (ref 11.5–15.4)
IMM GRANULOCYTES # BLD AUTO: 0.09 THOUSAND/UL (ref 0–0.2)
IMM GRANULOCYTES NFR BLD AUTO: 1 % (ref 0–2)
LYMPHOCYTES # BLD AUTO: 1.4 THOUSANDS/ΜL (ref 0.6–4.47)
LYMPHOCYTES NFR BLD AUTO: 16 % (ref 14–44)
MCH RBC QN AUTO: 27.6 PG (ref 26.8–34.3)
MCHC RBC AUTO-ENTMCNC: 32.6 G/DL (ref 31.4–37.4)
MCV RBC AUTO: 85 FL (ref 82–98)
MONOCYTES # BLD AUTO: 0.78 THOUSAND/ΜL (ref 0.17–1.22)
MONOCYTES NFR BLD AUTO: 9 % (ref 4–12)
NEUTROPHILS # BLD AUTO: 6.58 THOUSANDS/ΜL (ref 1.85–7.62)
NEUTS SEG NFR BLD AUTO: 73 % (ref 43–75)
NRBC BLD AUTO-RTO: 0 /100 WBCS
PLATELET # BLD AUTO: 157 THOUSANDS/UL (ref 149–390)
PMV BLD AUTO: 13.7 FL (ref 8.9–12.7)
RBC # BLD AUTO: 3.22 MILLION/UL (ref 3.81–5.12)
WBC # BLD AUTO: 8.93 THOUSAND/UL (ref 4.31–10.16)

## 2021-06-04 PROCEDURE — 99024 POSTOP FOLLOW-UP VISIT: CPT | Performed by: OBSTETRICS & GYNECOLOGY

## 2021-06-04 PROCEDURE — 85025 COMPLETE CBC W/AUTO DIFF WBC: CPT | Performed by: OBSTETRICS & GYNECOLOGY

## 2021-06-04 RX ADMIN — SODIUM CHLORIDE, SODIUM LACTATE, POTASSIUM CHLORIDE, AND CALCIUM CHLORIDE 75 ML/HR: .6; .31; .03; .02 INJECTION, SOLUTION INTRAVENOUS at 01:20

## 2021-06-04 RX ADMIN — KETOROLAC TROMETHAMINE 15 MG: 30 INJECTION, SOLUTION INTRAMUSCULAR at 12:11

## 2021-06-04 RX ADMIN — IBUPROFEN 600 MG: 600 TABLET, FILM COATED ORAL at 21:29

## 2021-06-04 RX ADMIN — KETOROLAC TROMETHAMINE 15 MG: 30 INJECTION, SOLUTION INTRAMUSCULAR at 05:58

## 2021-06-04 RX ADMIN — KETOROLAC TROMETHAMINE 15 MG: 30 INJECTION, SOLUTION INTRAMUSCULAR at 00:15

## 2021-06-04 RX ADMIN — ENOXAPARIN SODIUM 40 MG: 40 INJECTION SUBCUTANEOUS at 08:30

## 2021-06-04 RX ADMIN — DOCUSATE SODIUM 100 MG: 100 CAPSULE, LIQUID FILLED ORAL at 08:30

## 2021-06-04 RX ADMIN — ACETAMINOPHEN 650 MG: 325 TABLET, FILM COATED ORAL at 17:55

## 2021-06-04 RX ADMIN — DOCUSATE SODIUM 100 MG: 100 CAPSULE, LIQUID FILLED ORAL at 17:54

## 2021-06-04 RX ADMIN — ACETAMINOPHEN 650 MG: 325 TABLET, FILM COATED ORAL at 03:10

## 2021-06-04 RX ADMIN — ACETAMINOPHEN 650 MG: 325 TABLET, FILM COATED ORAL at 08:30

## 2021-06-04 NOTE — PLAN OF CARE
Problem: PAIN - ADULT  Goal: Verbalizes/displays adequate comfort level or baseline comfort level  Description: Interventions:  - Encourage patient to monitor pain and request assistance  - Assess pain using appropriate pain scale  - Administer analgesics based on type and severity of pain and evaluate response  - Implement non-pharmacological measures as appropriate and evaluate response  - Consider cultural and social influences on pain and pain management  - Notify physician/advanced practitioner if interventions unsuccessful or patient reports new pain  Outcome: Progressing     Problem: INFECTION - ADULT  Goal: Absence or prevention of progression during hospitalization  Description: INTERVENTIONS:  - Assess and monitor for signs and symptoms of infection  - Monitor lab/diagnostic results  - Monitor all insertion sites, i e  indwelling lines, tubes, and drains  - Monitor endotracheal if appropriate and nasal secretions for changes in amount and color  - [place] appropriate cooling/warming therapies per order  - Administer medications as ordered  - Instruct and encourage patient and family to use good hand hygiene technique  - Identify and instruct in appropriate isolation precautions for identified infection/condition  Outcome: Progressing  Goal: Absence of fever/infection during neutropenic period  Description: INTERVENTIONS:  - Monitor WBC    Outcome: Progressing     Problem: SAFETY ADULT  Goal: Patient will remain free of falls  Description: INTERVENTIONS:  - Assess patient frequently for physical needs  -  Identify cognitive and physical deficits and behaviors that affect risk of falls    -  [place] fall precautions as indicated by assessment   - Educate patient/family on patient safety including physical limitations  - Instruct patient to call for assistance with activity based on assessment  - Modify environment to reduce risk of injury  - Consider OT/PT consult to assist with strengthening/mobility  Outcome: Progressing  Goal: Maintain or return to baseline ADL function  Description: INTERVENTIONS:  -  Assess patient's ability to carry out ADLs; assess patient's baseline for ADL function and identify physical deficits which impact ability to perform ADLs (bathing, care of mouth/teeth, toileting, grooming, dressing, etc )  - Assess/evaluate cause of self-care deficits   - Assess range of motion  - Assess patient's mobility; develop plan if impaired  - Assess patient's need for assistive devices and provide as appropriate  - Encourage maximum independence but intervene and supervise when necessary  - Involve family in performance of ADLs  - Assess for home care needs following discharge   - Consider OT consult to assist with ADL evaluation and planning for discharge  - Provide patient education as appropriate  Outcome: Progressing  Goal: Maintain or return mobility status to optimal level  Description: INTERVENTIONS:  - Assess patient's baseline mobility status (ambulation, transfers, stairs, etc )    - Identify cognitive and physical deficits and behaviors that affect mobility  - Identify mobility aids required to assist with transfers and/or ambulation (gait belt, sit-to-stand, lift, walker, cane, etc )  - [place] fall precautions as indicated by assessment  - Record patient progress and toleration of activity level on Mobility SBAR; progress patient to next Phase/Stage  - Instruct patient to call for assistance with activity based on assessment  - Consider rehabilitation consult to assist with strengthening/weightbearing, etc   Outcome: Progressing     Problem: Knowledge Deficit  Goal: Patient/family/caregiver demonstrates understanding of disease process, treatment plan, medications, and discharge instructions  Description: Complete learning assessment and assess knowledge base    Interventions:  - Provide teaching at level of understanding  - Provide teaching via preferred learning methods  Outcome: Progressing     Problem: DISCHARGE PLANNING  Goal: Discharge to home or other facility with appropriate resources  Description: INTERVENTIONS:  - Identify barriers to discharge w/patient and caregiver  - Arrange for needed discharge resources and transportation as appropriate  - Identify discharge learning needs (meds, wound care, etc )  - Arrange for interpretive services to assist at discharge as needed  - Refer to Case Management Department for coordinating discharge planning if the patient needs post-hospital services based on physician/advanced practitioner order or complex needs related to functional status, cognitive ability, or social support system  Outcome: Progressing     Problem: POSTPARTUM  Goal: Experiences normal postpartum course  Description: INTERVENTIONS:  - Monitor maternal vital signs  - Assess uterine involution and lochia  Outcome: Progressing  Goal: Appropriate maternal -  bonding  Description: INTERVENTIONS:  - Identify family support  - Assess for appropriate maternal/infant bonding   -Encourage maternal/infant bonding opportunities  - Referral to  or  as needed  Outcome: Progressing  Goal: Establishment of infant feeding pattern  Description: INTERVENTIONS:  - Assess breast/bottle feeding  - Refer to lactation as needed  Outcome: Progressing  Goal: Incision(s), wounds(s) or drain site(s) healing without S/S of infection  Description: INTERVENTIONS  - Assess and document risk factors for skin impairment   - Assess and document dressing, incision, wound bed, drain sites and surrounding tissue  - Consider nutrition services referral as needed  - Oral mucous membranes remain intact  - Provide patient/ family education  Outcome: Progressing     Problem: ALTERATION IN THE BREAST  Goal: Optimize infant feeding at the breast  Description: INTERVENTIONS:  - Latch, breast and nipple assessment  - Assess prior breast feeding history  - Hand expression of breast milk  - For cracked, bleeding and or sore nipples reassess latch, treat damaged nipple  -Educate mother on feeding cues  -Positioning/latch techniques  Outcome: Progressing     Problem: INADEQUATE LATCH, SUCK OR SWALLOW  Goal: Demonstrate ability to latch and sustain latch, audible swallowing and satiety  Description: INTERVENTIONS:  - Assess oral anatomy, notify Physician/AP for abnormal findings  - Establish milk expression  - Maximize feeding opportunity (skin to skin, behavioral state)  - Position/latch techniques  - Discourage use of pacifier-artificial nipple  - Mechanical pumping  - Nipple Shield  - Supplemental formula feeding (Physician/AP order)  - Alternative feeding method  Outcome: Progressing

## 2021-06-04 NOTE — LACTATION NOTE
This note was copied from a baby's chart  Pal Lan speaks [address]  FOB translated  Breast and bottle feeding by choice  Encouraged breast stimulation with pumps  Pal Lan understanding that she will just start feeding when breast milk is in  Manual pump given for stimulation due to insurance status  Encouraged family to call when infant is hungry again for help with positioning/evaluation of latch  Encouraged paced bottle feeding  Handouts given in Croatian for both paced bottle feeding and early use of supplementation  Phone extension left on communication board in patient room for them to call

## 2021-06-05 VITALS
BODY MASS INDEX: 35.7 KG/M2 | HEIGHT: 62 IN | OXYGEN SATURATION: 98 % | TEMPERATURE: 98.2 F | WEIGHT: 194 LBS | DIASTOLIC BLOOD PRESSURE: 53 MMHG | SYSTOLIC BLOOD PRESSURE: 91 MMHG | RESPIRATION RATE: 18 BRPM | HEART RATE: 72 BPM

## 2021-06-05 RX ORDER — ACETAMINOPHEN 325 MG/1
650 TABLET ORAL EVERY 6 HOURS
Qty: 30 TABLET | Refills: 0 | Status: SHIPPED | OUTPATIENT
Start: 2021-06-05

## 2021-06-05 RX ADMIN — IBUPROFEN 600 MG: 600 TABLET, FILM COATED ORAL at 10:18

## 2021-06-05 RX ADMIN — ENOXAPARIN SODIUM 40 MG: 40 INJECTION SUBCUTANEOUS at 10:19

## 2021-06-05 RX ADMIN — DOCUSATE SODIUM 100 MG: 100 CAPSULE, LIQUID FILLED ORAL at 10:18

## 2021-06-05 RX ADMIN — ACETAMINOPHEN 650 MG: 325 TABLET, FILM COATED ORAL at 01:31

## 2021-06-05 RX ADMIN — ACETAMINOPHEN 650 MG: 325 TABLET, FILM COATED ORAL at 06:44

## 2021-06-05 NOTE — PROGRESS NOTES
Progress Note - OB/GYN   Devan Carty 30 y o  female MRN: [id number]  Unit/Bed#: -01 Encounter: [id number]    Assessment:  30 y o  [id number] s/p Repeat low transverse  section post-operative day 2    Plan:  1  Routine post-partum care  2  Encourage ambulation  3  Pain control as needed  4  Advance diet as tolerated  5  Rhogam not indicated  6  Contraception: patient wants Nexplanon, but is still pending MA; discuss in office  7  Hgb 11 2 --> 8 9  8  Patient desires early discharge today    Subjective/Objective   Chief Complaint:       Subjective:  Devan Carty is well appearing and has no complaints at this time  She denies any dizziness, nausea, vomiting, chest pain, shortness of breath, palpitations, or headaches  Pain: Well controlled with pain medication regimen  Tolerating PO: yes  Voiding: yes  Flatus: yes  BM: no  Ambulating: yes  Breastfeeding: yes  Chest pain: no  Shortness of breath: no  Leg pain: no  Lochia: Decreasing    Objective:     Vitals: Blood pressure 91/53, pulse 72, temperature 98 2 °F (36 8 °C), temperature source Oral, resp  rate 18, height 5' 2" (1 575 m), weight 88 kg (194 lb), SpO2 98 %, currently breastfeeding      Intake/Output Summary (Last 24 hours) at 2021 0935  Last data filed at 2021 1200  Gross per 24 hour   Intake --   Output 500 ml   Net -500 ml       Physical Exam:     General: NAD  Cardiovascular: RRR, no murmur, nl S1/S2   Lungs: CTAB, non-labored breathing   Abdomen: Soft, no distension/rebound/guarding/tenderness   Fundus: Firm, non-tender, fundus: -2 cm below the umbilicus   Incision: C/D/I  Lower Extremities: Non-tender    Lab, Imaging and other studies:     Recent Results (from the past 72 hour(s))   Type and screen and continue to monitor patient    Collection Time: 21  9:14 AM   Result Value Ref Range    ABO Grouping B     Rh Factor Positive     Antibody Screen Negative     Specimen Expiration Date [id number]    CBC and differential Collection Time: 06/03/21  9:14 AM   Result Value Ref Range    WBC 9 38 4 31 - 10 16 Thousand/uL    RBC 4 04 3 81 - 5 12 Million/uL    Hemoglobin 11 2 (L) 11 5 - 15 4 g/dL    Hematocrit 33 9 (L) 34 8 - 46 1 %    MCV 84 82 - 98 fL    MCH 27 7 26 8 - 34 3 pg    MCHC 33 0 31 4 - 37 4 g/dL    RDW 12 8 11 6 - 15 1 %    MPV 13 0 (H) 8 9 - 12 7 fL    Platelets 840 800 - 213 Thousands/uL    nRBC 0 /100 WBCs    Neutrophils Relative 74 43 - 75 %    Immat GRANS % 2 0 - 2 %    Lymphocytes Relative 14 14 - 44 %    Monocytes Relative 8 4 - 12 %    Eosinophils Relative 2 0 - 6 %    Basophils Relative 0 0 - 1 %    Neutrophils Absolute 6 93 1 85 - 7 62 Thousands/µL    Immature Grans Absolute 0 16 0 00 - 0 20 Thousand/uL    Lymphocytes Absolute 1 33 0 60 - 4 47 Thousands/µL    Monocytes Absolute 0 77 0 17 - 1 22 Thousand/µL    Eosinophils Absolute 0 16 0 00 - 0 61 Thousand/µL    Basophils Absolute 0 03 0 00 - 0 10 Thousands/µL   RPR    Collection Time: 06/03/21  9:14 AM   Result Value Ref Range    RPR Non-Reactive Non-Reactive   Green Top 4 ml on hold    Collection Time: 06/03/21  9:14 AM   Result Value Ref Range    Extra Tube Hold for add-ons      Blood gas, venous, cord    Collection Time: 06/03/21 11:26 AM   Result Value Ref Range    pH, Cord [place] 7 347 7 190 - 7 490    pCO2, Cord [place] 43 1 (H) 27 0 - 43 0 mm HG    pO2, Cord [place] 29 4 15 0 - 45 0 mm HG    HCO3, Cord [place] 23 1 12 2 - 28 6 mmol/L    Base Exc, Cord [place] -2 6 (L) 1 0 - 9 0 mmol/L    O2 Cont, Cord [place] 12 9 mL/dL    O2 HGB,VENOUS CORD 64 6 %   Blood gas, arterial, cord    Collection Time: 06/03/21 11:26 AM   Result Value Ref Range    pH, Cord Art 7 338 7 230 - 7 430    pCO2, Cord Art 46 3 30 0 - 60 0    pO2, Cord Art 21 8 5 0 - 25 0 mm HG    HCO3, Cord Art 24 3 17 3 - 27 3 mmol/L    Base Exc, Cord Art -1 8 (L) 3 0 - 11 0 mmol/L    O2 Content, Cord Art 9 1 ml/dl    O2 Hgb, Arterial Cord 44 5 %   CBC and differential    Collection Time: 06/04/21  5:17 AM   Result Value Ref Range    WBC 8 93 4 31 - 10 16 Thousand/uL    RBC 3 22 (L) 3 81 - 5 12 Million/uL    Hemoglobin 8 9 (L) 11 5 - 15 4 g/dL    Hematocrit 27 3 (L) 34 8 - 46 1 %    MCV 85 82 - 98 fL    MCH 27 6 26 8 - 34 3 pg    MCHC 32 6 31 4 - 37 4 g/dL    RDW 12 8 11 6 - 15 1 %    MPV 13 7 (H) 8 9 - 12 7 fL    Platelets 889 911 - 720 Thousands/uL    nRBC 0 /100 WBCs    Neutrophils Relative 73 43 - 75 %    Immat GRANS % 1 0 - 2 %    Lymphocytes Relative 16 14 - 44 %    Monocytes Relative 9 4 - 12 %    Eosinophils Relative 1 0 - 6 %    Basophils Relative 0 0 - 1 %    Neutrophils Absolute 6 58 1 85 - 7 62 Thousands/µL    Immature Grans Absolute 0 09 0 00 - 0 20 Thousand/uL    Lymphocytes Absolute 1 40 0 60 - 4 47 Thousands/µL    Monocytes Absolute 0 78 0 17 - 1 22 Thousand/µL    Eosinophils Absolute 0 05 0 00 - 0 61 Thousand/µL    Basophils Absolute 0 03 0 00 - 0 10 Thousands/µL     Meds:  acetaminophen, 650 mg, Oral, Q6H  docusate sodium, 100 mg, Oral, BID  enoxaparin, 40 mg, Subcutaneous, Q24H GIO      acetaminophen, 650 mg, Q4H PRN  benzocaine-menthol-lanolin-aloe, 1 application, 4x Daily PRN  calcium carbonate, 1,000 mg, Daily PRN  diphenhydrAMINE, 25 mg, Q6H PRN  fentaNYL, 25 mcg, Q3 min PRN  hydrocortisone, 1 application, Daily PRN  HYDROmorphone, 0 4 mg, Q5 Min PRN  HYDROmorphone, 0 5 mg, Q2H PRN  ibuprofen, 600 mg, Q4H PRN  magnesium hydroxide, 30 mL, Daily PRN  nalbuphine, 5 mg, Q3H PRN  ondansetron, 4 mg, Q8H PRN  oxyCODONE-acetaminophen, 1 tablet, Q4H PRN  oxyCODONE-acetaminophen, 2 tablet, Q4H PRN  simethicone, 80 mg, 4x Daily PRN  witch hazel-glycerin, 1 pad, Q4H PRN              Signature / Title: Rosana Gaspar MD, Ob/Gyn, PGY-2  Date: 6/5/2021  Time: 9:35 AM

## 2021-06-05 NOTE — PLAN OF CARE
Problem: PAIN - ADULT  Goal: Verbalizes/displays adequate comfort level or baseline comfort level  Description: Interventions:  - Encourage patient to monitor pain and request assistance  - Assess pain using appropriate pain scale  - Administer analgesics based on type and severity of pain and evaluate response  - Implement non-pharmacological measures as appropriate and evaluate response  - Consider cultural and social influences on pain and pain management  - Notify physician/advanced practitioner if interventions unsuccessful or patient reports new pain  Outcome: Progressing     Problem: INFECTION - ADULT  Goal: Absence or prevention of progression during hospitalization  Description: INTERVENTIONS:  - Assess and monitor for signs and symptoms of infection  - Monitor lab/diagnostic results  - Monitor all insertion sites, i e  indwelling lines, tubes, and drains  - Monitor endotracheal if appropriate and nasal secretions for changes in amount and color  - [place] appropriate cooling/warming therapies per order  - Administer medications as ordered  - Instruct and encourage patient and family to use good hand hygiene technique  - Identify and instruct in appropriate isolation precautions for identified infection/condition  Outcome: Progressing  Goal: Absence of fever/infection during neutropenic period  Description: INTERVENTIONS:  - Monitor WBC    Outcome: Progressing     Problem: SAFETY ADULT  Goal: Patient will remain free of falls  Description: INTERVENTIONS:  - Assess patient frequently for physical needs  -  Identify cognitive and physical deficits and behaviors that affect risk of falls    -  [place] fall precautions as indicated by assessment   - Educate patient/family on patient safety including physical limitations  - Instruct patient to call for assistance with activity based on assessment  - Modify environment to reduce risk of injury  - Consider OT/PT consult to assist with strengthening/mobility  Outcome: Progressing  Goal: Maintain or return to baseline ADL function  Description: INTERVENTIONS:  -  Assess patient's ability to carry out ADLs; assess patient's baseline for ADL function and identify physical deficits which impact ability to perform ADLs (bathing, care of mouth/teeth, toileting, grooming, dressing, etc )  - Assess/evaluate cause of self-care deficits   - Assess range of motion  - Assess patient's mobility; develop plan if impaired  - Assess patient's need for assistive devices and provide as appropriate  - Encourage maximum independence but intervene and supervise when necessary  - Involve family in performance of ADLs  - Assess for home care needs following discharge   - Consider OT consult to assist with ADL evaluation and planning for discharge  - Provide patient education as appropriate  Outcome: Progressing  Goal: Maintain or return mobility status to optimal level  Description: INTERVENTIONS:  - Assess patient's baseline mobility status (ambulation, transfers, stairs, etc )    - Identify cognitive and physical deficits and behaviors that affect mobility  - Identify mobility aids required to assist with transfers and/or ambulation (gait belt, sit-to-stand, lift, walker, cane, etc )  - [place] fall precautions as indicated by assessment  - Record patient progress and toleration of activity level on Mobility SBAR; progress patient to next Phase/Stage  - Instruct patient to call for assistance with activity based on assessment  - Consider rehabilitation consult to assist with strengthening/weightbearing, etc   Outcome: Progressing     Problem: Knowledge Deficit  Goal: Patient/family/caregiver demonstrates understanding of disease process, treatment plan, medications, and discharge instructions  Description: Complete learning assessment and assess knowledge base    Interventions:  - Provide teaching at level of understanding  - Provide teaching via preferred learning methods  Outcome: Progressing     Problem: DISCHARGE PLANNING  Goal: Discharge to home or other facility with appropriate resources  Description: INTERVENTIONS:  - Identify barriers to discharge w/patient and caregiver  - Arrange for needed discharge resources and transportation as appropriate  - Identify discharge learning needs (meds, wound care, etc )  - Arrange for interpretive services to assist at discharge as needed  - Refer to Case Management Department for coordinating discharge planning if the patient needs post-hospital services based on physician/advanced practitioner order or complex needs related to functional status, cognitive ability, or social support system  Outcome: Progressing     Problem: POSTPARTUM  Goal: Experiences normal postpartum course  Description: INTERVENTIONS:  - Monitor maternal vital signs  - Assess uterine involution and lochia  Outcome: Progressing  Goal: Appropriate maternal -  bonding  Description: INTERVENTIONS:  - Identify family support  - Assess for appropriate maternal/infant bonding   -Encourage maternal/infant bonding opportunities  - Referral to  or  as needed  Outcome: Progressing  Goal: Establishment of infant feeding pattern  Description: INTERVENTIONS:  - Assess breast/bottle feeding  - Refer to lactation as needed  Outcome: Progressing  Goal: Incision(s), wounds(s) or drain site(s) healing without S/S of infection  Description: INTERVENTIONS  - Assess and document risk factors for skin impairment   - Assess and document dressing, incision, wound bed, drain sites and surrounding tissue  - Consider nutrition services referral as needed  - Oral mucous membranes remain intact  - Provide patient/ family education  Outcome: Progressing     Problem: ALTERATION IN THE BREAST  Goal: Optimize infant feeding at the breast  Description: INTERVENTIONS:  - Latch, breast and nipple assessment  - Assess prior breast feeding history  - Hand expression of breast milk  - For cracked, bleeding and or sore nipples reassess latch, treat damaged nipple  -Educate mother on feeding cues  -Positioning/latch techniques  Outcome: Progressing     Problem: INADEQUATE LATCH, SUCK OR SWALLOW  Goal: Demonstrate ability to latch and sustain latch, audible swallowing and satiety  Description: INTERVENTIONS:  - Assess oral anatomy, notify Physician/AP for abnormal findings  - Establish milk expression  - Maximize feeding opportunity (skin to skin, behavioral state)  - Position/latch techniques  - Discourage use of pacifier-artificial nipple  - Mechanical pumping  - Nipple Shield  - Supplemental formula feeding (Physician/AP order)  - Alternative feeding method  Outcome: Progressing

## 2021-06-05 NOTE — PLAN OF CARE
Problem: PAIN - ADULT  Goal: Verbalizes/displays adequate comfort level or baseline comfort level  Description: Interventions:  - Encourage patient to monitor pain and request assistance  - Assess pain using appropriate pain scale  - Administer analgesics based on type and severity of pain and evaluate response  - Implement non-pharmacological measures as appropriate and evaluate response  - Consider cultural and social influences on pain and pain management  - Notify physician/advanced practitioner if interventions unsuccessful or patient reports new pain  Outcome: Progressing     Problem: INFECTION - ADULT  Goal: Absence or prevention of progression during hospitalization  Description: INTERVENTIONS:  - Assess and monitor for signs and symptoms of infection  - Monitor lab/diagnostic results  - Monitor all insertion sites, i e  indwelling lines, tubes, and drains  - Monitor endotracheal if appropriate and nasal secretions for changes in amount and color  - [place] appropriate cooling/warming therapies per order  - Administer medications as ordered  - Instruct and encourage patient and family to use good hand hygiene technique  - Identify and instruct in appropriate isolation precautions for identified infection/condition  Outcome: Progressing  Goal: Absence of fever/infection during neutropenic period  Description: INTERVENTIONS:  - Monitor WBC    Outcome: Progressing     Problem: SAFETY ADULT  Goal: Patient will remain free of falls  Description: INTERVENTIONS:  - Assess patient frequently for physical needs  -  Identify cognitive and physical deficits and behaviors that affect risk of falls    -  [place] fall precautions as indicated by assessment   - Educate patient/family on patient safety including physical limitations  - Instruct patient to call for assistance with activity based on assessment  - Modify environment to reduce risk of injury  - Consider OT/PT consult to assist with strengthening/mobility  Outcome: Progressing  Goal: Maintain or return to baseline ADL function  Description: INTERVENTIONS:  -  Assess patient's ability to carry out ADLs; assess patient's baseline for ADL function and identify physical deficits which impact ability to perform ADLs (bathing, care of mouth/teeth, toileting, grooming, dressing, etc )  - Assess/evaluate cause of self-care deficits   - Assess range of motion  - Assess patient's mobility; develop plan if impaired  - Assess patient's need for assistive devices and provide as appropriate  - Encourage maximum independence but intervene and supervise when necessary  - Involve family in performance of ADLs  - Assess for home care needs following discharge   - Consider OT consult to assist with ADL evaluation and planning for discharge  - Provide patient education as appropriate  Outcome: Progressing  Goal: Maintain or return mobility status to optimal level  Description: INTERVENTIONS:  - Assess patient's baseline mobility status (ambulation, transfers, stairs, etc )    - Identify cognitive and physical deficits and behaviors that affect mobility  - Identify mobility aids required to assist with transfers and/or ambulation (gait belt, sit-to-stand, lift, walker, cane, etc )  - [place] fall precautions as indicated by assessment  - Record patient progress and toleration of activity level on Mobility SBAR; progress patient to next Phase/Stage  - Instruct patient to call for assistance with activity based on assessment  - Consider rehabilitation consult to assist with strengthening/weightbearing, etc   Outcome: Progressing     Problem: Knowledge Deficit  Goal: Patient/family/caregiver demonstrates understanding of disease process, treatment plan, medications, and discharge instructions  Description: Complete learning assessment and assess knowledge base    Interventions:  - Provide teaching at level of understanding  - Provide teaching via preferred learning methods  Outcome: Progressing     Problem: DISCHARGE PLANNING  Goal: Discharge to home or other facility with appropriate resources  Description: INTERVENTIONS:  - Identify barriers to discharge w/patient and caregiver  - Arrange for needed discharge resources and transportation as appropriate  - Identify discharge learning needs (meds, wound care, etc )  - Arrange for interpretive services to assist at discharge as needed  - Refer to Case Management Department for coordinating discharge planning if the patient needs post-hospital services based on physician/advanced practitioner order or complex needs related to functional status, cognitive ability, or social support system  Outcome: Progressing     Problem: POSTPARTUM  Goal: Experiences normal postpartum course  Description: INTERVENTIONS:  - Monitor maternal vital signs  - Assess uterine involution and lochia  Outcome: Progressing  Goal: Appropriate maternal -  bonding  Description: INTERVENTIONS:  - Identify family support  - Assess for appropriate maternal/infant bonding   -Encourage maternal/infant bonding opportunities  - Referral to  or  as needed  Outcome: Progressing  Goal: Establishment of infant feeding pattern  Description: INTERVENTIONS:  - Assess breast/bottle feeding  - Refer to lactation as needed  Outcome: Progressing  Goal: Incision(s), wounds(s) or drain site(s) healing without S/S of infection  Description: INTERVENTIONS  - Assess and document risk factors for skin impairment   - Assess and document dressing, incision, wound bed, drain sites and surrounding tissue  - Consider nutrition services referral as needed  - Oral mucous membranes remain intact  - Provide patient/ family education  Outcome: Progressing     Problem: ALTERATION IN THE BREAST  Goal: Optimize infant feeding at the breast  Description: INTERVENTIONS:  - Latch, breast and nipple assessment  - Assess prior breast feeding history  - Hand expression of breast milk  - For cracked, bleeding and or sore nipples reassess latch, treat damaged nipple  -Educate mother on feeding cues  -Positioning/latch techniques  Outcome: Progressing     Problem: INADEQUATE LATCH, SUCK OR SWALLOW  Goal: Demonstrate ability to latch and sustain latch, audible swallowing and satiety  Description: INTERVENTIONS:  - Assess oral anatomy, notify Physician/AP for abnormal findings  - Establish milk expression  - Maximize feeding opportunity (skin to skin, behavioral state)  - Position/latch techniques  - Discourage use of pacifier-artificial nipple  - Mechanical pumping  - Nipple Shield  - Supplemental formula feeding (Physician/AP order)  - Alternative feeding method  Outcome: Progressing

## 2021-06-05 NOTE — LACTATION NOTE
This note was copied from a baby's chart  Mom plans to start breastfeeding once mature milk is in  Stressed she needs to breastfeed or pump once milk is in to maintain supply  Reviewed expected changes in infant feeding patterns over the next few days, engorgement relief measures, use of feeding log and when and where to call for additional assistance as needed  Given discharge breastfeeding pkat in [place] and same reviewed

## 2021-06-05 NOTE — PLAN OF CARE
Problem: PAIN - ADULT  Goal: Verbalizes/displays adequate comfort level or baseline comfort level  Description: Interventions:  - Encourage patient to monitor pain and request assistance  - Assess pain using appropriate pain scale  - Administer analgesics based on type and severity of pain and evaluate response  - Implement non-pharmacological measures as appropriate and evaluate response  - Consider cultural and social influences on pain and pain management  - Notify physician/advanced practitioner if interventions unsuccessful or patient reports new pain  6/5/2021 1439 by Saeid Hadley RN  Outcome: Completed  6/5/2021 1438 by Saeid Hadley RN  Outcome: Progressing     Problem: INFECTION - ADULT  Goal: Absence or prevention of progression during hospitalization  Description: INTERVENTIONS:  - Assess and monitor for signs and symptoms of infection  - Monitor lab/diagnostic results  - Monitor all insertion sites, i e  indwelling lines, tubes, and drains  - Monitor endotracheal if appropriate and nasal secretions for changes in amount and color  - [place] appropriate cooling/warming therapies per order  - Administer medications as ordered  - Instruct and encourage patient and family to use good hand hygiene technique  - Identify and instruct in appropriate isolation precautions for identified infection/condition  6/5/2021 1439 by Saeid Hadley RN  Outcome: Completed  6/5/2021 1438 by Saeid Hadley RN  Outcome: Progressing  Goal: Absence of fever/infection during neutropenic period  Description: INTERVENTIONS:  - Monitor WBC    6/5/2021 1439 by Saeid Hadley RN  Outcome: Completed  6/5/2021 1438 by Saeid Hadley RN  Outcome: Progressing     Problem: SAFETY ADULT  Goal: Patient will remain free of falls  Description: INTERVENTIONS:  - Assess patient frequently for physical needs  -  Identify cognitive and physical deficits and behaviors that affect risk of falls    -  [place] fall precautions as indicated by assessment   - Educate patient/family on patient safety including physical limitations  - Instruct patient to call for assistance with activity based on assessment  - Modify environment to reduce risk of injury  - Consider OT/PT consult to assist with strengthening/mobility  6/5/2021 1439 by Dom Fisher RN  Outcome: Completed  6/5/2021 1438 by Dom Fisher RN  Outcome: Progressing  Goal: Maintain or return to baseline ADL function  Description: INTERVENTIONS:  -  Assess patient's ability to carry out ADLs; assess patient's baseline for ADL function and identify physical deficits which impact ability to perform ADLs (bathing, care of mouth/teeth, toileting, grooming, dressing, etc )  - Assess/evaluate cause of self-care deficits   - Assess range of motion  - Assess patient's mobility; develop plan if impaired  - Assess patient's need for assistive devices and provide as appropriate  - Encourage maximum independence but intervene and supervise when necessary  - Involve family in performance of ADLs  - Assess for home care needs following discharge   - Consider OT consult to assist with ADL evaluation and planning for discharge  - Provide patient education as appropriate  6/5/2021 1439 by Dom Fisher RN  Outcome: Completed  6/5/2021 1438 by Dom Fisher RN  Outcome: Progressing  Goal: Maintain or return mobility status to optimal level  Description: INTERVENTIONS:  - Assess patient's baseline mobility status (ambulation, transfers, stairs, etc )    - Identify cognitive and physical deficits and behaviors that affect mobility  - Identify mobility aids required to assist with transfers and/or ambulation (gait belt, sit-to-stand, lift, walker, cane, etc )  - [place] fall precautions as indicated by assessment  - Record patient progress and toleration of activity level on Mobility SBAR; progress patient to next Phase/Stage  - Instruct patient to call for assistance with activity based on assessment  - Consider rehabilitation consult to assist with strengthening/weightbearing, etc   2021 by Delmer Pinzon RN  Outcome: Completed  2021 by Delmer Pinzon RN  Outcome: Progressing     Problem: Knowledge Deficit  Goal: Patient/family/caregiver demonstrates understanding of disease process, treatment plan, medications, and discharge instructions  Description: Complete learning assessment and assess knowledge base    Interventions:  - Provide teaching at level of understanding  - Provide teaching via preferred learning methods  2021 by Delmer Pinzon RN  Outcome: Completed  2021 by Delmer Pinzon RN  Outcome: Progressing     Problem: DISCHARGE PLANNING  Goal: Discharge to home or other facility with appropriate resources  Description: INTERVENTIONS:  - Identify barriers to discharge w/patient and caregiver  - Arrange for needed discharge resources and transportation as appropriate  - Identify discharge learning needs (meds, wound care, etc )  - Arrange for interpretive services to assist at discharge as needed  - Refer to Case Management Department for coordinating discharge planning if the patient needs post-hospital services based on physician/advanced practitioner order or complex needs related to functional status, cognitive ability, or social support system  2021 by Delmer Pinzon RN  Outcome: Completed  2021 by Delmer Pinzon RN  Outcome: Progressing     Problem: POSTPARTUM  Goal: Experiences normal postpartum course  Description: INTERVENTIONS:  - Monitor maternal vital signs  - Assess uterine involution and lochia  2021 by Delmer Pinzon RN  Outcome: Completed  2021 by Delmer Pinzon RN  Outcome: Progressing  Goal: Appropriate maternal -  bonding  Description: INTERVENTIONS:  - Identify family support  - Assess for appropriate maternal/infant bonding -Encourage maternal/infant bonding opportunities  - Referral to  or  as needed  6/5/2021 [id number] by Claudy Lyle RN  Outcome: Completed  6/5/2021 1438 by Claudy Lyle RN  Outcome: Progressing  Goal: Establishment of infant feeding pattern  Description: INTERVENTIONS:  - Assess breast/bottle feeding  - Refer to lactation as needed  6/5/2021 1439 by Claudy Lyle RN  Outcome: Completed  6/5/2021 1438 by Claudy Lyle RN  Outcome: Progressing  Goal: Incision(s), wounds(s) or drain site(s) healing without S/S of infection  Description: INTERVENTIONS  - Assess and document risk factors for skin impairment   - Assess and document dressing, incision, wound bed, drain sites and surrounding tissue  - Consider nutrition services referral as needed  - Oral mucous membranes remain intact  - Provide patient/ family education  6/5/2021 1439 by Claudy Lyle RN  Outcome: Completed  6/5/2021 1438 by Claudy Lyle RN  Outcome: Progressing     Problem: ALTERATION IN THE BREAST  Goal: Optimize infant feeding at the breast  Description: INTERVENTIONS:  - Latch, breast and nipple assessment  - Assess prior breast feeding history  - Hand expression of breast milk  - For cracked, bleeding and or sore nipples reassess latch, treat damaged nipple  -Educate mother on feeding cues  -Positioning/latch techniques  6/5/2021 1439 by Claudy Lyle RN  Outcome: Completed  6/5/2021 1438 by Claudy Lyle RN  Outcome: Progressing     Problem: INADEQUATE LATCH, SUCK OR SWALLOW  Goal: Demonstrate ability to latch and sustain latch, audible swallowing and satiety  Description: INTERVENTIONS:  - Assess oral anatomy, notify Physician/AP for abnormal findings  - Establish milk expression  - Maximize feeding opportunity (skin to skin, behavioral state)  - Position/latch techniques  - Discourage use of pacifier-artificial nipple  - Mechanical pumping  - Nipple Shield  - Supplemental formula feeding (Physician/AP order)  - Alternative feeding method  6/5/2021 1439 by Mitzy Garcia, RN  Outcome: Completed  6/5/2021 1438 by Mitzy Garcia, RN  Outcome: Progressing

## 2021-06-09 NOTE — PROGRESS NOTES
Subjective:     Vipul Gaspar is a 30 y o  [id number] female who presents for an incision check after a RLTCS on 6/3/21  She had not removed the Ster-Strips  She notes some mild pain in the middle of her incision but overall tolerable  She denies fevers, chills, incision bleeding, or significant drainage  She is breastfeeding and also complains of a hard area in her left breast that is sore to the touch and sometimes more painful  She notes that her baby has a harder time latching on that side  I explained that exam and symptoms consistent with  Clogged duct  Recommended firm massage with breastfeeding or pumping to unclog the duct and warm compresses for comfort  Objective:    Vitals: Blood pressure 105/62, pulse 73, height 5' 2" (1 575 m), weight 80 3 kg (177 lb), currently breastfeeding  Body mass index is 32 37 kg/m²  Physical Exam  Constitutional:       General: She is not in acute distress  Appearance: She is not ill-appearing, toxic-appearing or diaphoretic  HENT:      Head: Normocephalic and atraumatic  Pulmonary:      Effort: Pulmonary effort is normal  No respiratory distress  Abdominal:      Comments: Steri Strips removed, incision is C/D/I; one area of the incision where the the skin edges slightly overlap, but no opening in the incision on probing with a swab, no drainage or signs of infection    Skin:     General: Skin is warm and dry  Neurological:      General: No focal deficit present  Mental Status: She is alert and oriented to person, place, and time  Mental status is at baseline  Psychiatric:         Mood and Affect: Mood normal          Thought Content:  Thought content normal          Judgment: Judgment normal          Assessment/Plan:    - RTC in 2-3 weeks for full postpartum visit and discuss contraception         Isaac Tripathi MD  6/10/2021  8:37 AM

## 2021-06-10 ENCOUNTER — OFFICE VISIT (OUTPATIENT)
Dept: OBGYN CLINIC | Facility: CLINIC | Age: 26
End: 2021-06-10

## 2021-06-10 VITALS
BODY MASS INDEX: 32.57 KG/M2 | WEIGHT: 177 LBS | DIASTOLIC BLOOD PRESSURE: 62 MMHG | SYSTOLIC BLOOD PRESSURE: 105 MMHG | HEIGHT: 62 IN | HEART RATE: 73 BPM

## 2021-06-10 DIAGNOSIS — Z98.890 POST-OPERATIVE STATE: Primary | ICD-10-CM

## 2021-06-10 LAB — PLACENTA IN STORAGE: NORMAL

## 2021-06-10 PROCEDURE — 99213 OFFICE O/P EST LOW 20 MIN: CPT | Performed by: STUDENT IN AN ORGANIZED HEALTH CARE EDUCATION/TRAINING PROGRAM

## 2021-07-14 ENCOUNTER — TELEPHONE (OUTPATIENT)
Dept: OBGYN CLINIC | Facility: CLINIC | Age: 26
End: 2021-07-14

## 2021-07-20 ENCOUNTER — OFFICE VISIT (OUTPATIENT)
Dept: OBGYN CLINIC | Facility: CLINIC | Age: 26
End: 2021-07-20

## 2021-07-20 ENCOUNTER — PATIENT OUTREACH (OUTPATIENT)
Dept: OBGYN CLINIC | Facility: CLINIC | Age: 26
End: 2021-07-20

## 2021-07-20 VITALS
WEIGHT: 171 LBS | HEIGHT: 62 IN | DIASTOLIC BLOOD PRESSURE: 60 MMHG | BODY MASS INDEX: 31.47 KG/M2 | SYSTOLIC BLOOD PRESSURE: 93 MMHG | HEART RATE: 73 BPM

## 2021-07-20 DIAGNOSIS — R10.13 EPIGASTRIC PAIN: Primary | ICD-10-CM

## 2021-07-20 PROCEDURE — 99213 OFFICE O/P EST LOW 20 MIN: CPT | Performed by: OBSTETRICS & GYNECOLOGY

## 2021-07-20 NOTE — PROGRESS NOTES
2906  VISIT  Name: Antony Pabon  MRN: [id number]  : 1995      ASSESSMENT/PLAN:  Problem List Items Addressed This Visit     None      Visit Diagnoses     Epigastric pain    -  Primary    Relevant Orders    Ambulatory referral to Family Practice        RTC for Mirena IUD placement through [facility] program     SUBJECTIVE:  31LU [id number] s/p RLTCS on 6/3/21  Presenting w/ complaint of epigastric/periumbilical pain  +nausea, no emesis  No constipation or diarrhea  Denies any lower pelvic or incisional pain  Doing well postpartum otherwise  Interested in [place] [address] IUD for contraception  OBJECTIVE:  Vitals:    21 1319   BP: 93/60   Pulse: 73       Physical Exam  Constitutional:       General: She is not in acute distress  Appearance: She is not ill-appearing  Cardiovascular:      Rate and Rhythm: Normal rate  Pulmonary:      Effort: Pulmonary effort is normal  No respiratory distress  Abdominal:      General: There is no distension  Palpations: There is no mass  Tenderness: There is no abdominal tenderness  There is no guarding  Comments: Well healed transverse skin incision   Skin:     General: Skin is warm and dry  Neurological:      Mental Status: She is alert and oriented to person, place, and time     Psychiatric:         Mood and Affect: Mood normal          Behavior: Behavior normal          Tamica Alfaro MD  OB/GYN PGY-4  2021  2:03 PM

## 2021-07-20 NOTE — PROGRESS NOTES
BILL VASQUEZ met with Pt for post partum assessment  Pt reported she is doing well  Denies any depression signs  Pt is breast and bottle feeding her baby  Pt interested on Mirena IUD as long term contraception  BILL VASQUEZ dicussed [facility] and completed application  Pt verbalized understanding  Will continue the OCP for now  Pt denies other concern or question at this time

## 2021-07-27 ENCOUNTER — PROCEDURE VISIT (OUTPATIENT)
Dept: OBGYN CLINIC | Facility: CLINIC | Age: 26
End: 2021-07-27

## 2021-07-27 VITALS
BODY MASS INDEX: 31.47 KG/M2 | HEIGHT: 62 IN | WEIGHT: 171 LBS | HEART RATE: 70 BPM | SYSTOLIC BLOOD PRESSURE: 109 MMHG | DIASTOLIC BLOOD PRESSURE: 71 MMHG

## 2021-07-27 DIAGNOSIS — Z30.430 ENCOUNTER FOR IUD INSERTION: Primary | ICD-10-CM

## 2021-07-27 LAB — SL AMB POCT URINE HCG: NORMAL

## 2021-07-27 PROCEDURE — 58300 INSERT INTRAUTERINE DEVICE: CPT | Performed by: OBSTETRICS & GYNECOLOGY

## 2021-07-27 PROCEDURE — 81025 URINE PREGNANCY TEST: CPT | Performed by: OBSTETRICS & GYNECOLOGY

## 2021-07-27 NOTE — PATIENT INSTRUCTIONS
Dispositivo intrauterino   INSTRUCCIONES SOBRE EL [facility]ITAL[facility]:   Busque atención médica de inmediato si:  · Siente mucho dolor o sangra mucho samantha suazo período menstrual     · Tiene fiebre y dolor abdominal adam  Llame a suazo médico o ginecólogo si:  · Usted ahmet estar embarazada  · El DIU se ha salido  · Usted tiene sangrado de la vagina después de las relaciones sexuales, y no corresponde al período  · Usted tiene [address]  · No puede encontrar el cordón del DIU, el cordón se siente más antoni o siente el plástico del DIU  · Tiene flujo vaginal de color verdoso, amarillento o con mal olor  · Usted tiene preguntas o inquietudes acerca de suazo condición o cuidado  Medicamentos:  · [place], pueden disminuir la inflamación y el dolor o la [place]  [place] medicamento está disponible con o sin virginia receta médica  Los SONAM pueden causar sangrado estomacal o problemas renales en ciertas personas  Si usted yovany un medicamento anticoagulante, siempre pregúntele a suazo médico si los SONAM son seguros para usted  Siempre jun la etiqueta de jenise medicamento y [place] instrucciones  · [place] coretta medicamentos ailyn se le haya indicado  Consulte con suazo médico si usted ahmet que suazo medicamento no le está ayudando o si presenta efectos secundarios  Infórmele si es alérgico a cualquier medicamento  Mantenga virginia lista actualizada de los [place], las vitaminas y los productos herbales que yovany  Incluya los siguientes datos de los medicamentos: cantidad, frecuencia y motivo de administración  Traiga con usted la lista o los envases de las píldoras a coretta citas de seguimiento  Lleve la lista de los medicamentos con usted en harish de virginia emergencia  Cuidados personales:  · Aplique virginia compresa caliente para aliviar el dolor y los calambres  Use virginia almohadilla eléctrica de baja temperatura   Aplique calor en la parte baja del abdomen samantha 20 minutos cada hora, o ailyn se lo indicaron  · Regrese a coretta actividades según se lo indiquen  Jolley médico le dirá cuándo puede volver a trabajar, regresar a la escuela o hacer otras actividades  · No use tampón ni tenga relaciones sexuales hasta que jolley médico la autorice  Asegúrese de que el DIU esté en jolley sitio: El DIU tiene un cordón de hilo de plástico  Alexander Kade o 2 pulgadas (2 a 5 cm) de gabriela cordón cuelgan en la vagina  El cordón no se puede danay y no le causará ningún problema cuando tenga relaciones sexuales  Compruebe que el cordón esté en jolley sitio después de cada menstruación  Evans lo siguiente para comprobar que el DIU está en jolley lugar:  · [employer] las everardo con Roberto y agua tibia  Séqueselas con virginia toalla limpia  · Doble las rodillas y póngase en cuclillas cerca del suelo  · Introduzca con cuidado el dedo índice dentro de jolley vagina  El karson uterino está en la parte superior de la vagina y se siente ailyn la punta de la nariz  Busque el cordón del dispositivo  No jale del cordón  Usted no debería sentir la parte de plástico sania del DIU    · [employer] las everardo después de revisar el cordón del dispositivo  Para más información:  · [facility] [place] , Cinthya Gonzalez  Phone: [phone]  Web Address: [email or web address]  org    Acuda a coretta consultas de control con jolley médico según le indicaron  Anote coretta preguntas para que se acuerde de hacerlas samantha coretta visitas  © Copyright [employer] 2021 Information is for End User's use only and may not be sold, redistributed or otherwise used for commercial purposes  All illustrations and images included in CareNotes® are the copyrighted property of [employer]  [address] es sólo para uso en educación  Jolley intención no es darle un consejo médico sobre enfermedades o tratamientos   Colsulte con jolley [employer] Cranker farmacéutico antes de seguir cualquier régimen médico para saber si es seguro y efectivo para usted

## 2021-07-27 NOTE — PROGRESS NOTES
Procedure Visit    Subjective:  Junaid Snell is a 30 y o  [id number] female who presents for IUD insertion  Pt is s/p RLTCS for her third child on 6/3/21  Deliver and postpartum courses were uncomplicated  She is feeling well today  Still having minor postpartum bleeding  She had chlamydia during her pregnancy but this was treated and her most recent testing on 5/13 was negative  Pap smear is up to date  Objective:  Vitals:    07/27/21 1539   BP: 109/71   Pulse: 70        Physical Exam  Vitals and nursing note reviewed  Constitutional:       General: She is not in acute distress  Appearance: Normal appearance  She is well-developed  HENT:      Head: Normocephalic and atraumatic  Eyes:      Conjunctiva/sclera: Conjunctivae normal    Cardiovascular:      Rate and Rhythm: Normal rate and regular rhythm  Pulmonary:      Effort: Pulmonary effort is normal  No respiratory distress  Abdominal:      General: Abdomen is flat  There is no distension  Palpations: Abdomen is soft  Tenderness: There is no abdominal tenderness  Genitourinary:     Comments: Normal appearing external genitalia, vaginal mucosa, cervix  No abnormal discharge, bleeding, or lesions  Musculoskeletal:         General: Normal range of motion  Cervical back: Neck supple  Skin:     General: Skin is warm and dry  Neurological:      General: No focal deficit present  Mental Status: She is alert and oriented to person, place, and time  Mental status is at baseline  Psychiatric:         Mood and Affect: Mood normal          Behavior: Behavior normal            Iud insertions    Date/Time: 7/27/2021 2:34 PM  Performed by: Marce Nunes MD  Authorized by: Marce Nunes MD   Universal Protocol:  Procedure performed by: (Dr Gabino Guerrero present for procedure)  Consent: Verbal consent obtained  Written consent obtained    Risks and benefits: risks, benefits and alternatives were discussed  Consent given by: patient  Patient understanding: patient states understanding of the procedure being performed  Patient consent: the patient's understanding of the procedure matches consent given  Procedure consent: procedure consent matches procedure scheduled  Relevant documents: relevant documents present and verified  Site marked: the operative site was not marked  Patient identity confirmed: verbally with patient        Procedure:     Pelvic exam performed: yes      Negative GC/chlamydia test: yes (during prenatal care)      Negative urine pregnancy test: yes      Cervix cleaned and prepped: yes (iodine x 3)      Speculum placed in vagina: yes      Tenaculum applied to cervix: yes      Uterus sounded: yes      Uterus sound depth (cm):  9    IUD inserted with no complications: yes      IUD type:  Mirena    Strings trimmed: yes    Post-procedure:     Patient tolerated procedure well: yes      Patient will follow up after next period: yes    Comments:      Pt tolerated procedure well  Strings trimmed to 3-4 cm outside cervix  Assessment:  Heather Arnold is a 30 y o  [id number] postpartum female who presents for IUD insertion  Pt tolerated procedure well       Plan:  - RTC for IUD string check in 1 month     Dr Evangelina Sin was present for the entire procedure     Yoli Tomlinson MD   PGY-3, OBGYN

## 2021-08-03 ENCOUNTER — PATIENT OUTREACH (OUTPATIENT)
Dept: OBGYN CLINIC | Facility: CLINIC | Age: 26
End: 2021-08-03

## 2021-08-04 ENCOUNTER — TELEPHONE (OUTPATIENT)
Dept: OBGYN CLINIC | Facility: CLINIC | Age: 26
End: 2021-08-04

## 2021-08-04 NOTE — TELEPHONE ENCOUNTER
Patient called and left a voice message regarding questions she had  Patient requested a call back  Called patient back 3 times and patients voicemail box is full so cannot leave a message

## 2021-08-04 NOTE — TELEPHONE ENCOUNTER
Patient called back stating she is having "a lot of bleeding after Mirena insertion"  When asked how many pads she is going through in an hour patient states one- if that  She is going through about 5 pads per day  Patient states this is very unusual for her and she is concerned  I counseled patient regarding irregular bleeding being normal during post mirena insertion/ beginning stages of new IUD- however if irregular/ excessive bleeding is starting to become symptomatic- such as dizziness, or feeling weak patient should present to ER to be evaluated  Patient denies feeling dizzy  Patient given appointment for Friday for Mirena insertion follow up  Patient also mentioned she has experienced chest pain / arm pain  Patient advised she should go to ER to be evaluated for Chest pain / arm pain- most likely unrelating to Mirena  Patient verbalized understanding

## 2021-08-09 ENCOUNTER — OFFICE VISIT (OUTPATIENT)
Dept: OBGYN CLINIC | Facility: CLINIC | Age: 26
End: 2021-08-09

## 2021-08-09 VITALS
DIASTOLIC BLOOD PRESSURE: 67 MMHG | SYSTOLIC BLOOD PRESSURE: 113 MMHG | HEART RATE: 76 BPM | BODY MASS INDEX: 31.1 KG/M2 | HEIGHT: 62 IN | WEIGHT: 169 LBS

## 2021-08-09 DIAGNOSIS — Z30.431 IUD CHECK UP: Primary | ICD-10-CM

## 2021-08-09 DIAGNOSIS — Z97.5 BREAKTHROUGH BLEEDING ASSOCIATED WITH INTRAUTERINE DEVICE (IUD): ICD-10-CM

## 2021-08-09 DIAGNOSIS — N92.1 BREAKTHROUGH BLEEDING ASSOCIATED WITH INTRAUTERINE DEVICE (IUD): ICD-10-CM

## 2021-08-09 PROCEDURE — 99213 OFFICE O/P EST LOW 20 MIN: CPT | Performed by: NURSE PRACTITIONER

## 2021-08-09 NOTE — PROGRESS NOTES
PROBLEM GYNECOLOGICAL VISIT    Bhumika Walker is a 30 y o  female who presents today with complaint of bleeding after IUD placement  Her general medical history has been reviewed and she reports it as follows:    Past Medical History:   Diagnosis Date    Varicella     childhood     Past Surgical History:   Procedure Laterality Date     SECTION      x2    [place]  DELIVERY ONLY N/A 6/3/2021    Procedure:  SECTION () REPEAT;  Surgeon: Salo Rodas MD;  Location: [facility] ;  Service: Obstetrics     OB History        4    Para   3    Term   3            AB   1    Living   3       SAB   1    TAB        Ectopic        Multiple   0    Live Births   3           Obstetric Comments   Menstrual cycle: 14           Social History     Tobacco Use    Smoking status: Never Smoker    Smokeless tobacco: Never Used   Vaping Use    Vaping Use: Never used   Substance Use Topics    Alcohol use: Not Currently    Drug use: Not Currently       Current Outpatient Medications   Medication Instructions    acetaminophen (TYLENOL) 650 mg, Oral, Every 6 hours    ferrous sulfate 324 mg, Oral, Every other day    Gtnulv-KvTiy-HtBct-FA-CA-Omega (Complete  DHA) 29-1-200 & 250 MG MISC TWICE A DAY       History of Present Illness:   Patient had a Mirena IUD placed 13 days ago  She is approximately 9 weeks s/p RLTCS  She reports vaginal bleeding like a light period since IUD placement  She has been changing a panty liner a few times per day  No clots or severe pain  Review of Systems:  Review of Systems   Constitutional: Negative  Gastrointestinal: Negative  Genitourinary: Positive for vaginal bleeding  Physical Exam:  There were no vitals taken for this visit  Physical Exam  Constitutional:       Appearance: Normal appearance  She is normal weight     Genitourinary:      Vulva, inguinal canal, urethra and vagina normal       Vaginal exam comments: Scant amount of Cristhian Sails discharge in vaginal vault  IUD strings visualized  Abdominal:      General: Abdomen is flat  Palpations: Abdomen is soft  Neurological:      Mental Status: She is alert  Skin:     General: Skin is warm and dry  Vitals reviewed  Assessment:   1  Mirena IUD in place   2  Normal bleeding with IUD     Plan:   Reassurance give  Will return to office for scheduled IUD check 4 weeks post procedure

## 2021-08-23 ENCOUNTER — OFFICE VISIT (OUTPATIENT)
Dept: OBGYN CLINIC | Facility: CLINIC | Age: 26
End: 2021-08-23

## 2021-08-23 ENCOUNTER — HOSPITAL ENCOUNTER (EMERGENCY)
Facility: HOSPITAL | Age: 26
Discharge: HOME | End: 2021-08-23
Attending: EMERGENCY MEDICINE | Admitting: EMERGENCY MEDICINE
Payer: COMMERCIAL

## 2021-08-23 VITALS
HEART RATE: 75 BPM | BODY MASS INDEX: 31.87 KG/M2 | WEIGHT: 173.2 LBS | HEIGHT: 62 IN | SYSTOLIC BLOOD PRESSURE: 111 MMHG | DIASTOLIC BLOOD PRESSURE: 68 MMHG

## 2021-08-23 VITALS
BODY MASS INDEX: 31.1 KG/M2 | WEIGHT: 169 LBS | OXYGEN SATURATION: 98 % | TEMPERATURE: 98.1 F | RESPIRATION RATE: 17 BRPM | HEART RATE: 84 BPM | SYSTOLIC BLOOD PRESSURE: 112 MMHG | HEIGHT: 62 IN | DIASTOLIC BLOOD PRESSURE: 71 MMHG

## 2021-08-23 DIAGNOSIS — Z30.431 IUD CHECK UP: Primary | ICD-10-CM

## 2021-08-23 DIAGNOSIS — L50.9 URTICARIA: Primary | ICD-10-CM

## 2021-08-23 LAB
ATRIAL RATE: 74 BPM
P AXIS: 69 DEGREES
PR INTERVAL: 136 MS
QRS AXIS: 91 DEGREES
QRSD INTERVAL: 82 MS
QT INTERVAL: 364 MS
QTC INTERVAL: 404 MS
T WAVE AXIS: 71 DEGREES
VENTRICULAR RATE: 74 BPM

## 2021-08-23 PROCEDURE — 93005 ELECTROCARDIOGRAM TRACING: CPT

## 2021-08-23 PROCEDURE — 93010 ELECTROCARDIOGRAM REPORT: CPT | Performed by: INTERNAL MEDICINE

## 2021-08-23 PROCEDURE — 99283 EMERGENCY DEPT VISIT LOW MDM: CPT

## 2021-08-23 PROCEDURE — 99284 EMERGENCY DEPT VISIT MOD MDM: CPT | Performed by: EMERGENCY MEDICINE

## 2021-08-23 PROCEDURE — 99212 OFFICE O/P EST SF 10 MIN: CPT | Performed by: NURSE PRACTITIONER

## 2021-08-23 RX ORDER — DEXAMETHASONE 4 MG/1
4 TABLET ORAL ONCE
Status: COMPLETED | OUTPATIENT
Start: 2021-08-23 | End: 2021-08-23

## 2021-08-23 RX ORDER — DIPHENHYDRAMINE HCL 25 MG
25 TABLET ORAL ONCE
Status: COMPLETED | OUTPATIENT
Start: 2021-08-23 | End: 2021-08-23

## 2021-08-23 RX ADMIN — DEXAMETHASONE 4 MG: 4 TABLET ORAL at 06:24

## 2021-08-23 RX ADMIN — DIPHENHYDRAMINE HCL 25 MG: 25 TABLET ORAL at 05:34

## 2021-08-23 NOTE — ED ATTENDING ATTESTATION
8/23/2021  ITimmy MD, saw and evaluated the patient  I have discussed the patient with the resident/non-physician practitioner and agree with the resident's/non-physician practitioner's findings, Plan of Care, and MDM as documented in the resident's/non-physician practitioner's note, except where noted  All available labs and Radiology studies were reviewed  I was present for key portions of any procedure(s) performed by the resident/non-physician practitioner and I was immediately available to provide assistance  At this point I agree with the current assessment done in the Emergency Department  I have conducted an independent evaluation of this patient a history and physical is as follows:    ED Course     68-year-old female presenting to the emergency department for evaluation of generalized urticarial rash  Unknown precipitant  No shortness of breath  Patient experienced palpitations while in the waiting room  On examination oropharynx is unremarkable  Lungs are clear to auscultation bilaterally  Patient has diffuse urticarial rash present in the low back, bilateral arms, bilateral knees  Small dose Benadryl and Decadron  Patient is currently breastfeeding so would recommend against high dose Benadryl or continuous dose Benadryl      Critical Care Time  Procedures

## 2021-08-23 NOTE — DISCHARGE INSTRUCTIONS
Sonali por venir al departamento de emergencias para el cuidado de suazo erupción  Creemos que suazo erupción se debe a virginia reacción alérgica a virginia sustancia desconocida  No hay ningún compromiso respiratorio, y ahmet que suazo establo para el jameson  Le hemos dado Benadryl y Decadron en la sarah de emergencias  Estos son seguros ailyn lo hace 1 vez para la lactancia materna  Si usted experimenta cualquier recurrencia de la erupción observada, enrojecimiento, hinchazón, picazón de la garganta, hinchazón de la garganta por favor regrese a la sarah de emergencias para la reevaluación inmediata  También he proporcionado virginia derivación ambulatoria a la práctica familiar  Lo llamarán para programar virginia romie con un proveedor de atención primaria en [place]  Si experimenta virginia recurrencia de suazo erupción, recomiendo recoger Harsh Reginaldo en la farmacia  Estos medicamentos son seguros para la lactancia materna

## 2021-08-23 NOTE — PROGRESS NOTES
PROBLEM GYNECOLOGICAL VISIT    Kenisha Chin is a 30 y o  female who presents today for an IUD check  Her general medical history has been reviewed and she reports it as follows:    Past Medical History:   Diagnosis Date    Varicella     childhood     Past Surgical History:   Procedure Laterality Date     SECTION      x2    [place]  DELIVERY ONLY N/A 6/3/2021    Procedure:  SECTION () REPEAT;  Surgeon: Silver Dolan MD;  Location: [facility] ;  Service: Obstetrics     OB History        4    Para   3    Term   3            AB   1    Living   3       SAB   1    TAB        Ectopic        Multiple   0    Live Births   3           Obstetric Comments   Menstrual cycle: 14           Social History     Tobacco Use    Smoking status: Never Smoker    Smokeless tobacco: Never Used   Vaping Use    Vaping Use: Never used   Substance Use Topics    Alcohol use: Not Currently    Drug use: Not Currently       Current Outpatient Medications   Medication Instructions    acetaminophen (TYLENOL) 650 mg, Oral, Every 6 hours    ferrous sulfate 324 mg, Oral, Every other day    levonorgestrel (MIRENA) 20 MCG/24HR IUD 1 each, Intrauterine, Once    Mhayvb-HrMfc-KnRtq-FA-CA-Omega (Complete  DHA) 29-1-200 & 250 MG MISC TWICE A DAY       History of Present Illness:   Delbert Caballero presents today for an IUD check  She had a Mirena IUD placed on 21  She has had occasional light bleeding since insertion  Has had intercourse without issue  Overall is happy with this method of contraception and would like to continue  Review of Systems:  Review of Systems   Constitutional: Negative  Gastrointestinal: Negative  Genitourinary: Negative          Physical Exam:  /68 (BP Location: Right arm, Patient Position: Sitting, Cuff Size: Adult)   Pulse 75   Ht 5' 2" (1 575 m)   Wt 78 6 kg (173 lb 3 2 oz)   BMI 31 68 kg/m²   Physical Exam  Constitutional:       General: She is not in acute distress  Appearance: Normal appearance  Genitourinary:      Vulva, inguinal canal, vagina and uterus normal       IUD strings visualized  Neurological:      Mental Status: She is alert  Skin:     General: Skin is warm and dry  Psychiatric:         Mood and Affect: Mood normal          Behavior: Behavior normal    Vitals reviewed  Assessment:   1  Mirena IUD in place    Plan:   1  Return to office for annual exam or PRN

## 2021-08-23 NOTE — ED PROVIDER NOTES
History  Chief Complaint   Patient presents with    Rash     Pt c/o rash b/l arms and back which started 4 days ago  Pt aslo statyes while sitting in waiting room developed chest discomfort and palpitations     Patient is a 30year old female presenting to the ED for evaluation itchy rash that is distributed over bilateral forearms, lower back, and behind her knees  Patient states that the rash has been present 4  Patient did not take any Benadryl or any other antihistamines at home because she is currently breastfeeding  Patient could not tolerate the itching tonight prompting ED evaluation  Patient denies any swelling or itching in her nose, mouth, throat  Patient denies chest pain, but reports that she did have an episode of palpitations in the waiting room here  His palpitations have since resolved  No chest pain, no shortness of breath, no recent viral illnesses  Patient denies the use of any new detergents at home, new medications, new foods, new clothing  She denies any exposure to any environmental triggers  Patient does not have a cat or dog at home  Prior to Admission Medications   Prescriptions Last Dose Informant Patient Reported? Taking?    Joqfvt-WhGem-YlNoi-FA-CA-Omega (Complete Page DHA) 29-1-200 & 250 MG MISC  Self No No   Sig: TWICE A DAY   acetaminophen (TYLENOL) 325 mg tablet   No No   Sig: Take 2 tablets (650 mg total) by mouth every 6 (six) hours   Patient not taking: Reported on 2021   ferrous sulfate 324 (65 Fe) mg  Self No No   Sig: Take 1 tablet (324 mg total) by mouth every other day      Facility-Administered Medications: None       Past Medical History:   Diagnosis Date    Varicella     childhood       Past Surgical History:   Procedure Laterality Date     SECTION      x2    PA  DELIVERY ONLY N/A 6/3/2021    Procedure:  SECTION () REPEAT;  Surgeon: Olena Tidwell MD;  Location: [facility] ;  Service: Obstetrics       Family History   Problem Relation Age of Onset    No Known Problems Mother     Heart attack Father 62    No Known Problems Sister     No Known Problems Brother     No Known Problems Son     Diabetes Maternal Grandmother         type 2    No Known Problems Son     Uterine cancer Maternal Aunt      I have reviewed and agree with the history as documented  E-Cigarette/Vaping    E-Cigarette Use Never User      E-Cigarette/Vaping Substances    Nicotine No     THC No     CBD No     Flavoring No     Other No     Unknown No      Social History     Tobacco Use    Smoking status: Never Smoker    Smokeless tobacco: Never Used   Vaping Use    Vaping Use: Never used   Substance Use Topics    Alcohol use: Not Currently    Drug use: Not Currently        Review of Systems   Constitutional: Negative for chills and fever  HENT: Negative for congestion, ear pain, sore throat, trouble swallowing and voice change  Eyes: Negative for pain and visual disturbance  Respiratory: Negative for cough, chest tightness and shortness of breath  Cardiovascular: Negative for chest pain and palpitations  Gastrointestinal: Negative for abdominal pain and vomiting  Genitourinary: Negative for dysuria and hematuria  Musculoskeletal: Negative for arthralgias and back pain  Skin: Positive for rash (Itchy rash over bilateral forearms, back, behind the knees  )  Negative for color change  Neurological: Negative for seizures and syncope  All other systems reviewed and are negative        Physical Exam  ED Triage Vitals [08/23/21 0512]   Temperature Pulse Respirations Blood Pressure SpO2   98 1 °F (36 7 °C) 84 17 112/71 98 %      Temp Source Heart Rate Source Patient Position - Orthostatic VS BP Location FiO2 (%)   Oral Monitor Sitting Left arm --      Pain Score       4             Orthostatic Vital Signs  Vitals:    08/23/21 0512   BP: 112/71   Pulse: 84   Patient Position - Orthostatic VS: Sitting       Physical Exam  Vitals and nursing note reviewed  Constitutional:       General: She is not in acute distress  Appearance: She is well-developed  HENT:      Head: Normocephalic and atraumatic  Right Ear: External ear normal       Left Ear: External ear normal       Nose: Nose normal       Mouth/Throat:      Mouth: Mucous membranes are moist       Pharynx: Oropharynx is clear  No posterior oropharyngeal erythema  Comments: Uvula is midline, no evidence of oropharyngeal edema  Eyes:      Conjunctiva/sclera: Conjunctivae normal    Cardiovascular:      Rate and Rhythm: Normal rate and regular rhythm  Heart sounds: No murmur heard  No gallop  Pulmonary:      Effort: Pulmonary effort is normal  No respiratory distress  Breath sounds: Normal breath sounds  Abdominal:      Palpations: Abdomen is soft  Tenderness: There is no abdominal tenderness  Musculoskeletal:      Cervical back: Neck supple  Skin:     General: Skin is warm and dry  Capillary Refill: Capillary refill takes less than 2 seconds  Findings: Rash (Diffuse urticarial rash of bilateral forearms, lower back, and behind knees) present  Neurological:      General: No focal deficit present  Mental Status: She is alert and oriented to person, place, and time  Psychiatric:         Mood and Affect: Mood normal          ED Medications  Medications   diphenhydrAMINE (BENADRYL) tablet 25 mg (25 mg Oral Given 8/23/21 0534)   dexamethasone (DECADRON) tablet 4 mg (4 mg Oral Given 8/23/21 8707)       Diagnostic Studies  Results Reviewed     None                 No orders to display         Procedures  Procedures      ED Course  ED Course as of Aug 23 0638   [facility] Aug 23, 2021   4034 Patient given Decadron 4 mg PO and Benadryl 25 mg PO  Reviewed the risk of antihistamines and steroids for breastfeeding infants, and the effects are not concerning   Prolonged use of these medications are associated with decreased breast milk production, but 1 time doses do not have consequence  0541 EKG interpretation: SR at 74 bpm, normal axis, normal intervals, flipped T wave in V1-V3 similar in appearance to prior EKG on 12/24/20 as read by me      0629 On re-evaluation, patient feels well  I discussed with patient that Benadryl and Decadron or safe for breastfeeding as a 1 time dose  If patient experiences any recurrence of the rash, I advised her to  his either Allegra or Zyrtec at the pharmacy, as second-generation is antihistamines are safe to take while breast feeding  0630 Patient understands  She also feels better compared to initial evaluation  Patient is stable for discharge  SBIRT 22yo+      Most Recent Value   SBIRT (24 yo +)   In order to provide better care to our patients, we are screening all of our patients for alcohol and drug use  Would it be okay to ask you these screening questions? Yes Filed at: 08/23/2021 0531   Initial Alcohol Screen: US AUDIT-C    1  How often do you have a drink containing alcohol?  0 Filed at: 08/23/2021 0531   2  How many drinks containing alcohol do you have on a typical day you are drinking? 0 Filed at: 08/23/2021 0531   3a  Male UNDER 65: How often do you have five or more drinks on one occasion? 0 Filed at: 08/23/2021 0531   3b  FEMALE Any Age, or MALE 65+: How often do you have 4 or more drinks on one occassion? 0 Filed at: 08/23/2021 0531   Audit-C Score  0 Filed at: 08/23/2021 6543   ALF: How many times in the past year have you    Used an illegal drug or used a prescription medication for non-medical reasons?   Never Filed at: 08/23/2021 0531                MDM    Disposition  Final diagnoses:   Urticaria     Time reflects when diagnosis was documented in both MDM as applicable and the Disposition within this note     Time User Action Codes Description Comment    8/23/2021  6:16 AM Seth Morales Add [L50 9] Urticaria       ED Disposition ED Disposition Condition Date/Time Comment    Discharge Stable Mon Aug 23, 2021  6:16 AM Krista Layton discharge to home/self care  Follow-up Information     Follow up With Specialties Details Why Contact Info        Follow up with ambulatory referral to family practice as provided  Patient's Medications   Discharge Prescriptions    No medications on file     No discharge procedures on file  PDMP Review     None           ED Provider  Attending physically available and evaluated Krista Layton I managed the patient along with the ED Attending      Electronically Signed by         Deandra Max DO  08/23/21 5778

## 2021-09-07 ENCOUNTER — OFFICE VISIT (OUTPATIENT)
Dept: DENTISTRY | Facility: CLINIC | Age: 26
End: 2021-09-07

## 2021-09-07 VITALS — DIASTOLIC BLOOD PRESSURE: 59 MMHG | SYSTOLIC BLOOD PRESSURE: 93 MMHG | HEART RATE: 77 BPM | TEMPERATURE: 97.4 F

## 2021-09-07 DIAGNOSIS — K02.9 CARIES: ICD-10-CM

## 2021-09-07 DIAGNOSIS — K02.9 CARIES INVOLVING MULTIPLE SURFACES OF TOOTH: ICD-10-CM

## 2021-09-07 DIAGNOSIS — Z01.21 ENCOUNTER FOR DENTAL EXAMINATION AND CLEANING WITH ABNORMAL FINDINGS: Primary | ICD-10-CM

## 2021-09-07 PROCEDURE — D0210 INTRAORAL - COMPLETE SERIES OF RADIOGRAPHIC IMAGES: HCPCS | Performed by: DENTIST

## 2021-09-07 PROCEDURE — D0150 COMPREHENSIVE ORAL EVALUATION - NEW OR ESTABLISHED PATIENT: HCPCS | Performed by: DENTIST

## 2021-09-08 NOTE — PROGRESS NOTES
Comprehensive Exam    Carl Lopez presents for a comprehensive exam  Verbal consent for treatment given in addition to the forms  Reviewed health history - Patient is ASA I  Pt just had a baby  Consents signed: Yes    Perio: perio charting to be completed at next visit as time did not allow  Pain Scale: 0  Caries Assessment: high  Radiographs: [facility]    Oral Hygiene instruction reviewed and given  Hygiene recall visits recommended to the patient  Treatment Plan:  1  Infection control  2  Caries control:  3  prosth    Pt presents to dental clinic for a comp exam  Existing findings and caries charted  Time did not allow for perio charting-to be done at prophy visit  #8 nonrestorable and #11 nonrestorable will require extractions  Pt was advised on immediate interim maxillary partial denture when 8, 11 are extracted  Pt was unsure of decision  Interim partial was treatment plan so pre auth can be generated  Pt was advised on pros and cons of interim and was unsure when she left due to finances  However, on her way out pt stated she wants the teeth extracted and will consider the interim after healing  Pt is Vincentian speaking please review treatment before extracting teeth  Pt also has  2 laceration on tongue (4X4mm and 9X5mm) on the right ventral surface of the tongue - due to frictional keratosis (pt missing upper maxillary premolars and tongue gets caught)  Pt is not a smoker  #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, no swelling noted  Deep lingual decay that wraps mesially to distally, periapical pathology noted  Advise pt that it is best to extract #8 and #11 as soon as possible due to increased risk of infection  Pt understands  After extraction and delivery of interim caries control needs to be started  Pt is interested in implants- however advised pt we need to finish phase 1 of treatment which is extractions, megan, prophy, before we attempt phase 2  Pt understands       #18 and #31 have mesial tipping, bone in #19 and #30 area does not look feasible for implant placement  Pt is interested in implant in side #8      NV- extraction of #8  and #11   NNV- alginates for upper maxillary partial  NNNV- delivery of interim maxillary partial     Prognosis is Good    Referrals needed: No    Next visit: #8 nonrestorable extraction and #6 root extraction 75 minutes

## 2021-11-08 ENCOUNTER — OFFICE VISIT (OUTPATIENT)
Dept: DENTISTRY | Facility: CLINIC | Age: 26
End: 2021-11-08

## 2021-11-08 VITALS — SYSTOLIC BLOOD PRESSURE: 99 MMHG | DIASTOLIC BLOOD PRESSURE: 64 MMHG | TEMPERATURE: 97.5 F | HEART RATE: 81 BPM

## 2021-11-08 DIAGNOSIS — K02.9 CARIES: Primary | ICD-10-CM

## 2021-11-08 PROCEDURE — D2392 RESIN-BASED COMPOSITE - 2 SURFACES, POSTERIOR: HCPCS | Performed by: DENTIST

## 2021-11-08 PROCEDURE — D2391 RESIN-BASED COMPOSITE - 1 SURFACE, POSTERIOR: HCPCS | Performed by: DENTIST

## 2021-12-03 ENCOUNTER — HOSPITAL ENCOUNTER (EMERGENCY)
Facility: HOSPITAL | Age: 26
Discharge: HOME | End: 2021-12-03
Attending: EMERGENCY MEDICINE | Admitting: EMERGENCY MEDICINE
Payer: COMMERCIAL

## 2021-12-03 VITALS
TEMPERATURE: 98.2 F | OXYGEN SATURATION: 99 % | DIASTOLIC BLOOD PRESSURE: 74 MMHG | SYSTOLIC BLOOD PRESSURE: 105 MMHG | RESPIRATION RATE: 18 BRPM | HEART RATE: 77 BPM
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
## 2021-12-03 DIAGNOSIS — S16.1XXA ACUTE STRAIN OF NECK MUSCLE, INITIAL ENCOUNTER: ICD-10-CM

## 2021-12-03 DIAGNOSIS — M54.9 BACK PAIN: ICD-10-CM

## 2021-12-03 PROCEDURE — 99284 EMERGENCY DEPT VISIT MOD MDM: CPT

## 2021-12-03 PROCEDURE — 96372 THER/PROPH/DIAG INJ SC/IM: CPT

## 2021-12-03 PROCEDURE — 99284 EMERGENCY DEPT VISIT MOD MDM: CPT | Performed by: EMERGENCY MEDICINE

## 2021-12-03 RX ORDER — NAPROXEN 500 MG/1
500 TABLET ORAL 2 TIMES DAILY WITH MEALS
Qty: 10 TABLET | Refills: 0 | Status: SHIPPED | OUTPATIENT
Start: 2021-12-03 | End: 2021-12-08

## 2021-12-03 RX ORDER — ACETAMINOPHEN 325 MG/1
650 TABLET ORAL ONCE
Status: COMPLETED | OUTPATIENT
Start: 2021-12-03 | End: 2021-12-03

## 2021-12-03 RX ORDER — METHOCARBAMOL 500 MG/1
500 TABLET, FILM COATED ORAL ONCE
Status: COMPLETED | OUTPATIENT
Start: 2021-12-03 | End: 2021-12-03

## 2021-12-03 RX ORDER — METHOCARBAMOL 500 MG/1
500 TABLET, FILM COATED ORAL 2 TIMES DAILY
Qty: 10 TABLET | Refills: 0 | Status: SHIPPED | OUTPATIENT
Start: 2021-12-03 | End: 2021-12-08

## 2021-12-03 RX ORDER — KETOROLAC TROMETHAMINE 30 MG/ML
15 INJECTION, SOLUTION INTRAMUSCULAR; INTRAVENOUS ONCE
Status: COMPLETED | OUTPATIENT
Start: 2021-12-03 | End: 2021-12-03

## 2021-12-03 RX ADMIN — ACETAMINOPHEN 650 MG: 325 TABLET, FILM COATED ORAL at 01:54

## 2021-12-03 RX ADMIN — METHOCARBAMOL 500 MG: 500 TABLET ORAL at 01:54

## 2021-12-03 RX ADMIN — KETOROLAC TROMETHAMINE 15 MG: 30 INJECTION, SOLUTION INTRAMUSCULAR at 01:54

## 2022-01-24 ENCOUNTER — APPOINTMENT (EMERGENCY)
Dept: RADIOLOGY | Facility: HOSPITAL | Age: 27
End: 2022-01-24
Payer: COMMERCIAL

## 2022-01-24 ENCOUNTER — HOSPITAL ENCOUNTER (EMERGENCY)
Facility: HOSPITAL | Age: 27
Discharge: HOME | End: 2022-01-24
Attending: EMERGENCY MEDICINE
Payer: COMMERCIAL

## 2022-01-24 VITALS
WEIGHT: 170 LBS | TEMPERATURE: 98.4 F | OXYGEN SATURATION: 98 % | DIASTOLIC BLOOD PRESSURE: 79 MMHG | HEART RATE: 85 BPM | SYSTOLIC BLOOD PRESSURE: 100 MMHG | BODY MASS INDEX: 31.09 KG/M2 | RESPIRATION RATE: 20 BRPM

## 2022-01-24 DIAGNOSIS — R06.00 DYSPNEA: Primary | ICD-10-CM

## 2022-01-24 DIAGNOSIS — B34.9 VIRAL SYNDROME: ICD-10-CM

## 2022-01-24 PROCEDURE — 99285 EMERGENCY DEPT VISIT HI MDM: CPT | Performed by: EMERGENCY MEDICINE

## 2022-01-24 PROCEDURE — 71045 X-RAY EXAM CHEST 1 VIEW: CPT

## 2022-01-24 PROCEDURE — 99285 EMERGENCY DEPT VISIT HI MDM: CPT

## 2022-01-24 PROCEDURE — 93005 ELECTROCARDIOGRAM TRACING: CPT

## 2022-01-24 PROCEDURE — 87636 SARSCOV2 & INF A&B AMP PRB: CPT | Performed by: EMERGENCY MEDICINE

## 2022-01-25 LAB
ATRIAL RATE: 73 BPM
FLUAV RNA RESP QL NAA+PROBE: NEGATIVE
FLUBV RNA RESP QL NAA+PROBE: NEGATIVE
P AXIS: 72 DEGREES
PR INTERVAL: 142 MS
QRS AXIS: 91 DEGREES
QRSD INTERVAL: 70 MS
QT INTERVAL: 364 MS
QTC INTERVAL: 401 MS
SARS-COV-2 RNA RESP QL NAA+PROBE: NEGATIVE
T WAVE AXIS: 87 DEGREES
VENTRICULAR RATE: 73 BPM

## 2022-01-25 PROCEDURE — 93010 ELECTROCARDIOGRAM REPORT: CPT | Performed by: INTERNAL MEDICINE

## 2022-01-25 NOTE — RESULT ENCOUNTER NOTE
I called and verified patient by name and birth date and let her know that her flu/COVID-19 swab was negative  No further questions at this time

## 2022-01-25 NOTE — ED PROVIDER NOTES
History  Chief Complaint   Patient presents with    Shortness of Breath     pt c/o sob + cough x1 day  denies cp  HPI     28 yo F with no significant past medical history who presents for evaluation of congestion and shortness of breath  Patient states shortness of breath started this evening  She states she has mild chest pain  Also has cough and congestion  Denies fevers, chills, abdominal pain, nausea, vomiting or diarrhea  Denies leg pain or leg swelling  Denies any recent sick contacts  Patient is not vaccinated against COVID  Prior to Admission Medications   Prescriptions Last Dose Informant Patient Reported? Taking?    Aiphye-TkEdr-YwVuv-FA-CA-Omega (Complete Page DHA) 29-1-200 & 250 MG MISC  Self No No   Sig: TWICE A DAY   acetaminophen (TYLENOL) 325 mg tablet   No No   Sig: Take 2 tablets (650 mg total) by mouth every 6 (six) hours   Patient not taking: Reported on 2021   ferrous sulfate 324 (65 Fe) mg  Self No No   Sig: Take 1 tablet (324 mg total) by mouth every other day   levonorgestrel (MIRENA) 20 MCG/24HR IUD   Yes No   Si each by Intrauterine route once   methocarbamol (ROBAXIN) 500 mg tablet   No No   Sig: Take 1 tablet (500 mg total) by mouth 2 (two) times a day for 5 days   naproxen (Naprosyn) 500 mg tablet   No No   Sig: Take 1 tablet (500 mg total) by mouth 2 (two) times a day with meals for 5 days      Facility-Administered Medications: None       Past Medical History:   Diagnosis Date    Varicella     childhood       Past Surgical History:   Procedure Laterality Date     SECTION      x2    CT  DELIVERY ONLY N/A 6/3/2021    Procedure:  SECTION () REPEAT;  Surgeon: Tawana Harding MD;  Location: [facility] ;  Service: Obstetrics       Family History   Problem Relation Age of Onset    No Known Problems Mother     Heart attack Father 62    No Known Problems Sister     No Known Problems Brother     No Known Problems Son     Diabetes Maternal Grandmother         type 2    No Known Problems Son     Uterine cancer Maternal Aunt      I have reviewed and agree with the history as documented  E-Cigarette/Vaping    E-Cigarette Use Never User      E-Cigarette/Vaping Substances    Nicotine No     THC No     CBD No     Flavoring No     Other No     Unknown No      Social History     Tobacco Use    Smoking status: Never Smoker    Smokeless tobacco: Never Used   Vaping Use    Vaping Use: Never used   Substance Use Topics    Alcohol use: Not Currently    Drug use: Not Currently        Review of Systems   Constitutional: Negative for appetite change, chills and fever  HENT: Positive for congestion  Negative for rhinorrhea and sore throat  Respiratory: Positive for cough and shortness of breath  Cardiovascular: Positive for chest pain  Gastrointestinal: Negative for abdominal pain, diarrhea, nausea and vomiting  Genitourinary: Negative for dysuria, frequency, hematuria and urgency  Musculoskeletal: Negative for arthralgias and myalgias  Skin: Negative for color change and rash  Neurological: Negative for dizziness, weakness, light-headedness, numbness and headaches  All other systems reviewed and are negative  Physical Exam  ED Triage Vitals [01/24/22 2108]   Temperature Pulse Respirations Blood Pressure SpO2   98 4 °F (36 9 °C) 85 20 100/79 98 %      Temp Source Heart Rate Source Patient Position - Orthostatic VS BP Location FiO2 (%)   Tympanic Monitor Sitting Left arm --      Pain Score       --             Orthostatic Vital Signs  Vitals:    01/24/22 2108   BP: 100/79   Pulse: 85   Patient Position - Orthostatic VS: Sitting       Physical Exam  Vitals and nursing note reviewed  Constitutional:       General: She is not in acute distress  Appearance: Normal appearance  She is well-developed and normal weight  She is not ill-appearing, toxic-appearing or diaphoretic     HENT:      Head: Normocephalic and atraumatic  Mouth/Throat:      Mouth: Mucous membranes are moist       Pharynx: Oropharynx is clear  Eyes:      Extraocular Movements: Extraocular movements intact  Conjunctiva/sclera: Conjunctivae normal    Cardiovascular:      Rate and Rhythm: Normal rate and regular rhythm  Pulses: Normal pulses  Heart sounds: Normal heart sounds  No murmur heard  No friction rub  No gallop  Pulmonary:      Effort: Pulmonary effort is normal  No respiratory distress  Breath sounds: Normal breath sounds  No decreased breath sounds, wheezing, rhonchi or rales  Abdominal:      General: Bowel sounds are normal  There is no distension  Palpations: Abdomen is soft  Tenderness: There is no abdominal tenderness  There is no guarding or rebound  Musculoskeletal:         General: No tenderness  Cervical back: Neck supple  Right lower leg: No tenderness  No edema  Left lower leg: No tenderness  No edema  Skin:     General: Skin is warm and dry  Coloration: Skin is not pale  Findings: No erythema or rash  Neurological:      General: No focal deficit present  Mental Status: She is alert and oriented to person, place, and time  Cranial Nerves: No cranial nerve deficit  Sensory: No sensory deficit  Motor: No weakness  Psychiatric:         Mood and Affect: Mood normal          Behavior: Behavior normal          ED Medications  Medications - No data to display    Diagnostic Studies  Results Reviewed     Procedure Component Value Units Date/Time    COVID/FLU [[id number]] Collected: 01/24/22 2139    Lab Status:  In process Specimen: Nares from Nose Updated: 01/24/22 2143                 XR chest 1 view portable    (Results Pending)         Procedures  ECG 12 Lead Documentation Only    Date/Time: 1/24/2022 10:01 PM  Performed by: Cass Knight MD  Authorized by: Cass Knight MD     Indications / Diagnosis:  Chest pain  ECG reviewed by me, the ED Provider: yes    Patient location:  ED  Previous ECG:     Previous ECG:  Compared to current    Similarity:  No change    Comparison to cardiac monitor: Yes    Interpretation:     Interpretation: non-specific    Rate:     ECG rate:  73    ECG rate assessment: normal    Rhythm:     Rhythm: sinus rhythm    Ectopy:     Ectopy: none    QRS:     QRS axis:  Right    QRS intervals:  Normal  Conduction:     Conduction: normal    ST segments:     ST segments:  Normal  T waves:     T waves: inverted      Inverted:  V1, V2 and V3          ED Course                             SBIRT 20yo+      Most Recent Value   SBIRT (22 yo +)    In order to provide better care to our patients, we are screening all of our patients for alcohol and drug use  Would it be okay to ask you these screening questions? No Filed at: 01/24/2022 2124                MDM     26 yo F with no significant past medical history who presents for evaluation of congestion and shortness of breath for one day  Patient is well appearing, vitals normal    Likely viral syndrome  Will obtain EKG, CXR and COVID test   EKG reviewed, normal sinus rhythm, T wave inversions in anterior leads, similar to prior  CXR shows no acute cardiopulmonary disease  Discussed with patient, will have patient follow up with PCP  Discussed with patient strict return precautions  Discussed with patient that she will receive phone call with results of COVID test within 1-2 days  Patient expressed understanding and was agreeable for discharge         Disposition  Final diagnoses:   Dyspnea   Viral syndrome     Time reflects when diagnosis was documented in both MDM as applicable and the Disposition within this note     Time User Action Codes Description Comment    1/24/2022 10:47 PM Arlina Leaver Add [R06 00] Dyspnea     1/24/2022 10:47 PM Arlina Leaver Add [B34 9] Viral syndrome       ED Disposition     ED Disposition Condition Date/Time Comment    Discharge Stable Mon Jan 24, 2022 10:46 PM  [facility] discharge to home/self care  Follow-up Information     Follow up With Specialties Details Why Contact Info    [employer]  Schedule an appointment as soon as possible for a visit in 3 days  [phone]            Discharge Medication List as of 2022 10:48 PM      CONTINUE these medications which have NOT CHANGED    Details   acetaminophen (TYLENOL) 325 mg tablet Take 2 tablets (650 mg total) by mouth every 6 (six) hours, Starting Sat 2021, Normal      ferrous sulfate 324 (65 Fe) mg Take 1 tablet (324 mg total) by mouth every other day, Starting Mon 3/15/2021, Normal      levonorgestrel (MIRENA) 20 MCG/24HR IUD 1 each by Intrauterine route once, Historical Med      methocarbamol (ROBAXIN) 500 mg tablet Take 1 tablet (500 mg total) by mouth 2 (two) times a day for 5 days, Starting Fri 12/3/2021, Until 2021, Normal      naproxen (Naprosyn) 500 mg tablet Take 1 tablet (500 mg total) by mouth 2 (two) times a day with meals for 5 days, Starting Fri 12/3/2021, Until 2021, Normal      Vrjwwz-IoNmz-KpQtf-FA-CA-Omega (Complete  DHA) 29-1-200 & 250 MG MISC TWICE A DAY, Normal           No discharge procedures on file  PDMP Review     None           ED Provider  Attending physically available and evaluated  [employer] [facility]  I managed the patient along with the ED Attending      Electronically Signed by         Mickie Hernández MD  22 3219 yes

## 2022-01-25 NOTE — ED ATTENDING ATTESTATION
1/24/2022  IMichael DO, saw and evaluated the patient  I have discussed the patient with the resident/non-physician practitioner and agree with the resident's/non-physician practitioner's findings, Plan of Care, and MDM as documented in the resident's/non-physician practitioner's note, except where noted  All available labs and Radiology studies were reviewed  I was present for key portions of any procedure(s) performed by the resident/non-physician practitioner and I was immediately available to provide assistance  At this point I agree with the current assessment done in the Emergency Department  I have conducted an independent evaluation of this patient a history and physical is as follows:      23-year-old female Maori-speaking only presents for cough congestion for the past one day  No fevers  Has chest pain described as tightness  Not exertional   No other modifying factors or associated symptoms  Not vaccinated against COVID  She has normal vital signs  She has clear lungs she has no murmurs  She has no lower extremity edema no rashes  Throat is clear    Plan is COVID test chest x-ray rule out pneumonia, ECG for ischemia    ED Course         Critical Care Time  Procedures

## 2022-02-15 ENCOUNTER — OFFICE VISIT (OUTPATIENT)
Dept: DENTISTRY | Facility: CLINIC | Age: 27
End: 2022-02-15

## 2022-02-15 VITALS — DIASTOLIC BLOOD PRESSURE: 68 MMHG | HEART RATE: 74 BPM | SYSTOLIC BLOOD PRESSURE: 113 MMHG

## 2022-02-15 DIAGNOSIS — K02.9 CARIES INVOLVING MULTIPLE SURFACES OF TOOTH: Primary | ICD-10-CM

## 2022-02-15 PROCEDURE — D7140 EXTRACTION, ERUPTED TOOTH OR EXPOSED ROOT (ELEVATION AND/OR FORCEPS REMOVAL): HCPCS | Performed by: DENTIST

## 2022-02-24 ENCOUNTER — OFFICE VISIT (OUTPATIENT)
Dept: DENTISTRY | Facility: CLINIC | Age: 27
End: 2022-02-24

## 2022-02-24 VITALS — SYSTOLIC BLOOD PRESSURE: 86 MMHG | DIASTOLIC BLOOD PRESSURE: 59 MMHG | TEMPERATURE: 98.7 F | HEART RATE: 70 BPM

## 2022-02-24 DIAGNOSIS — K02.9 CARIES: Primary | ICD-10-CM

## 2022-02-24 PROCEDURE — D2391 RESIN-BASED COMPOSITE - 1 SURFACE, POSTERIOR: HCPCS | Performed by: DENTIST

## 2022-02-24 NOTE — PROGRESS NOTES
Nathalia Braden presents for treatment  The patient speaks Azeri - translation accomplished via language line  [id number]  [facility] reviewed, no changes noted  The patient had a comprehensive exam in 9/2020 but perio charting was not completed  Perio charting completed today - most sulcus depths <4mm  Patient very sensitive to probing  Recommend the patient return for a prophy - the patient has moderate generalized plaque build-up  Reviewed [facility] - the patient reports brushing 2x/day with a manual toothbrush and not flossing  Emphasized importance of brushing better for more effective plaque removal      Noted dorsal tongue has 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  Please continue to monitor at future appointments  The patient had maxillary anterior teeth extracted at her last appointment (extraction sites healing as expected)  She is now missing several maxillary teeth (including both canines)  The patient is in need of an interim partial denture to maintain tooth position of both maxillary and mandibular teeth, however, she has several areas of decay  Emphasized importance of returning for caries control so that she can obtain the partial denture before her teeth shift unfavorably  Today, we completed composite filling #16-O:    Discussed with patient possible need for additional treatment in future if pulp is inflamed  Pt understands and consents  Applied topical benzocaine, administered 1 carpule of 4% septocaine with 1:100,000 epi via local infiltration  Anesthesia verified  Prepped tooth #16 with 245 carbide on high speed  Caries removed with round carbide on slow speed  Isolation with cotton rolls  Caries was deep  Scrubbed prep with chlorhexidine, applied limelight liner to pulpal floor, etched with 37% H2PO4, scrubbed with Adhese, and restored with Tetric bulk nik shade A2  Finished and polished restoration  Verified occlusion  Pt left satisfied      NV: #13-MO, #15-OL resin composite

## 2022-04-15 ENCOUNTER — APPOINTMENT (EMERGENCY)
Dept: RADIOLOGY | Facility: HOSPITAL | Age: 27
End: 2022-04-15
Payer: COMMERCIAL

## 2022-04-15 ENCOUNTER — HOSPITAL ENCOUNTER (EMERGENCY)
Facility: HOSPITAL | Age: 27
Discharge: HOME | End: 2022-04-15
Attending: EMERGENCY MEDICINE
Payer: COMMERCIAL

## 2022-04-15 VITALS
TEMPERATURE: 98.3 F | HEART RATE: 71 BPM | RESPIRATION RATE: 16 BRPM | DIASTOLIC BLOOD PRESSURE: 53 MMHG | SYSTOLIC BLOOD PRESSURE: 111 MMHG | OXYGEN SATURATION: 97 %

## 2022-04-15 DIAGNOSIS — R10.9 RIGHT FLANK PAIN: Primary | ICD-10-CM

## 2022-04-15 LAB
ALBUMIN SERPL BCP-MCNC: 4.2 G/DL (ref 3.5–5)
ALP SERPL-CCNC: 121 U/L (ref 46–116)
ALT SERPL W P-5'-P-CCNC: 38 U/L (ref 12–78)
ANION GAP SERPL CALCULATED.3IONS-SCNC: 5 MMOL/L (ref 4–13)
AST SERPL W P-5'-P-CCNC: 21 U/L (ref 5–45)
BACTERIA UR QL AUTO: ABNORMAL /HPF
BASOPHILS # BLD AUTO: 0.02 THOUSANDS/ΜL (ref 0–0.1)
BASOPHILS NFR BLD AUTO: 0 % (ref 0–1)
BILIRUB SERPL-MCNC: 0.69 MG/DL (ref 0.2–1)
BILIRUB UR QL STRIP: NEGATIVE
BUN SERPL-MCNC: 11 MG/DL (ref 5–25)
CALCIUM SERPL-MCNC: 9.3 MG/DL (ref 8.3–10.1)
CHLORIDE SERPL-SCNC: 110 MMOL/L (ref 100–108)
CLARITY UR: ABNORMAL
CO2 SERPL-SCNC: 24 MMOL/L (ref 21–32)
COLOR UR: YELLOW
CREAT SERPL-MCNC: 0.79 MG/DL (ref 0.6–1.3)
EOSINOPHIL # BLD AUTO: 0.14 THOUSAND/ΜL (ref 0–0.61)
EOSINOPHIL NFR BLD AUTO: 2 % (ref 0–6)
ERYTHROCYTE [DISTWIDTH] IN BLOOD BY AUTOMATED COUNT: 12.3 % (ref 11.6–15.1)
EXT PREG TEST URINE: NEGATIVE
EXT. CONTROL ED NAV: NORMAL
GFR SERPL CREATININE-BSD FRML MDRD: 102 ML/MIN/1.73SQ M
GLUCOSE SERPL-MCNC: 87 MG/DL (ref 65–140)
GLUCOSE UR STRIP-MCNC: NEGATIVE MG/DL
HCT VFR BLD AUTO: 40.5 % (ref 34.8–46.1)
HGB BLD-MCNC: 13.7 G/DL (ref 11.5–15.4)
HGB UR QL STRIP.AUTO: ABNORMAL
IMM GRANULOCYTES # BLD AUTO: 0.02 THOUSAND/UL (ref 0–0.2)
IMM GRANULOCYTES NFR BLD AUTO: 0 % (ref 0–2)
KETONES UR STRIP-MCNC: NEGATIVE MG/DL
LEUKOCYTE ESTERASE UR QL STRIP: NEGATIVE
LIPASE SERPL-CCNC: 141 U/L (ref 73–393)
LYMPHOCYTES # BLD AUTO: 1.6 THOUSANDS/ΜL (ref 0.6–4.47)
LYMPHOCYTES NFR BLD AUTO: 26 % (ref 14–44)
MCH RBC QN AUTO: 28.1 PG (ref 26.8–34.3)
MCHC RBC AUTO-ENTMCNC: 33.8 G/DL (ref 31.4–37.4)
MCV RBC AUTO: 83 FL (ref 82–98)
MONOCYTES # BLD AUTO: 0.41 THOUSAND/ΜL (ref 0.17–1.22)
MONOCYTES NFR BLD AUTO: 7 % (ref 4–12)
MUCOUS THREADS UR QL AUTO: ABNORMAL
NEUTROPHILS # BLD AUTO: 3.99 THOUSANDS/ΜL (ref 1.85–7.62)
NEUTS SEG NFR BLD AUTO: 65 % (ref 43–75)
NITRITE UR QL STRIP: NEGATIVE
NON-SQ EPI CELLS URNS QL MICRO: ABNORMAL /HPF
NRBC BLD AUTO-RTO: 0 /100 WBCS
PH UR STRIP.AUTO: 5.5 [PH] (ref 4.5–8)
PLATELET # BLD AUTO: 179 THOUSANDS/UL (ref 149–390)
PMV BLD AUTO: 12.8 FL (ref 8.9–12.7)
POTASSIUM SERPL-SCNC: 4.1 MMOL/L (ref 3.5–5.3)
PROT SERPL-MCNC: 7.7 G/DL (ref 6.4–8.2)
PROT UR STRIP-MCNC: NEGATIVE MG/DL
RBC # BLD AUTO: 4.87 MILLION/UL (ref 3.81–5.12)
RBC #/AREA URNS AUTO: ABNORMAL /HPF
SODIUM SERPL-SCNC: 139 MMOL/L (ref 136–145)
SP GR UR STRIP.AUTO: >=1.03 (ref 1–1.03)
UROBILINOGEN UR QL STRIP.AUTO: 0.2 E.U./DL
WBC # BLD AUTO: 6.18 THOUSAND/UL (ref 4.31–10.16)
WBC #/AREA URNS AUTO: ABNORMAL /HPF

## 2022-04-15 PROCEDURE — 93005 ELECTROCARDIOGRAM TRACING: CPT

## 2022-04-15 PROCEDURE — 85025 COMPLETE CBC W/AUTO DIFF WBC: CPT | Performed by: EMERGENCY MEDICINE

## 2022-04-15 PROCEDURE — G1004 CDSM NDSC: HCPCS

## 2022-04-15 PROCEDURE — 81001 URINALYSIS AUTO W/SCOPE: CPT

## 2022-04-15 PROCEDURE — 36415 COLL VENOUS BLD VENIPUNCTURE: CPT | Performed by: EMERGENCY MEDICINE

## 2022-04-15 PROCEDURE — 99284 EMERGENCY DEPT VISIT MOD MDM: CPT

## 2022-04-15 PROCEDURE — 96374 THER/PROPH/DIAG INJ IV PUSH: CPT

## 2022-04-15 PROCEDURE — 83690 ASSAY OF LIPASE: CPT | Performed by: EMERGENCY MEDICINE

## 2022-04-15 PROCEDURE — 99284 EMERGENCY DEPT VISIT MOD MDM: CPT | Performed by: EMERGENCY MEDICINE

## 2022-04-15 PROCEDURE — 81025 URINE PREGNANCY TEST: CPT | Performed by: EMERGENCY MEDICINE

## 2022-04-15 PROCEDURE — 80053 COMPREHEN METABOLIC PANEL: CPT | Performed by: EMERGENCY MEDICINE

## 2022-04-15 PROCEDURE — 96361 HYDRATE IV INFUSION ADD-ON: CPT

## 2022-04-15 PROCEDURE — 74176 CT ABD & PELVIS W/O CONTRAST: CPT

## 2022-04-15 RX ORDER — OXYCODONE HYDROCHLORIDE 5 MG/1
5 TABLET ORAL ONCE
Status: COMPLETED | OUTPATIENT
Start: 2022-04-15 | End: 2022-04-15

## 2022-04-15 RX ORDER — KETOROLAC TROMETHAMINE 30 MG/ML
15 INJECTION, SOLUTION INTRAMUSCULAR; INTRAVENOUS ONCE
Status: COMPLETED | OUTPATIENT
Start: 2022-04-15 | End: 2022-04-15

## 2022-04-15 RX ADMIN — SODIUM CHLORIDE 1000 ML: 0.9 INJECTION, SOLUTION INTRAVENOUS at 12:23

## 2022-04-15 RX ADMIN — KETOROLAC TROMETHAMINE 15 MG: 30 INJECTION, SOLUTION INTRAMUSCULAR at 12:24

## 2022-04-15 RX ADMIN — OXYCODONE HYDROCHLORIDE 5 MG: 5 TABLET ORAL at 14:10

## 2022-04-15 NOTE — DISCHARGE INSTRUCTIONS
Please use the following pain medications as prescribed:  - Tylenol 650mg every 6 hours  - Motrin 400mg every 6 hours  They work in different ways so can be used together at the same time  Follow up with your primary care physician

## 2022-04-16 LAB
ATRIAL RATE: 98 BPM
P AXIS: 67 DEGREES
PR INTERVAL: 116 MS
QRS AXIS: 77 DEGREES
QRSD INTERVAL: 82 MS
QT INTERVAL: 314 MS
QTC INTERVAL: 400 MS
T WAVE AXIS: 102 DEGREES
VENTRICULAR RATE: 98 BPM

## 2022-04-16 PROCEDURE — 93010 ELECTROCARDIOGRAM REPORT: CPT | Performed by: INTERNAL MEDICINE

## 2022-04-16 NOTE — ED PROVIDER NOTES
History  Chief Complaint   Patient presents with    Flank Pain     right flank pain for 3 days     HPI    56-year-old female presenting for 3 days of right-sided flank pain  Pain starts in her right flank and radiates down to her groin  Pain is intermittent and is described as aching  Is brought on randomly but occasionally worsens with movement  Cannot recall any relieving factors  Has not taking any medications for symptoms  Denies fever, chills, nausea, vomiting, urinary symptoms, vaginal symptoms, chest pain, shortness of breath, headache  Denies any prior history of renal stones  Prior to Admission Medications   Prescriptions Last Dose Informant Patient Reported? Taking?    Agefcu-VaAlz-FyUpw-FA-CA-Omega (Complete Page DHA) 29-1-200 & 250 MG MISC  Self No No   Sig: TWICE A DAY   acetaminophen (TYLENOL) 325 mg tablet   No No   Sig: Take 2 tablets (650 mg total) by mouth every 6 (six) hours   Patient not taking: Reported on 2021   ferrous sulfate 324 (65 Fe) mg  Self No No   Sig: Take 1 tablet (324 mg total) by mouth every other day   levonorgestrel (MIRENA) 20 MCG/24HR IUD   Yes No   Si each by Intrauterine route once   methocarbamol (ROBAXIN) 500 mg tablet   No No   Sig: Take 1 tablet (500 mg total) by mouth 2 (two) times a day for 5 days   naproxen (Naprosyn) 500 mg tablet   No No   Sig: Take 1 tablet (500 mg total) by mouth 2 (two) times a day with meals for 5 days      Facility-Administered Medications: None       Past Medical History:   Diagnosis Date    Varicella     childhood       Past Surgical History:   Procedure Laterality Date     SECTION      x2    DE  DELIVERY ONLY N/A 6/3/2021    Procedure:  SECTION () REPEAT;  Surgeon: Amanda Collins MD;  Location: [facility] ;  Service: Obstetrics       Family History   Problem Relation Age of Onset    No Known Problems Mother     Heart attack Father 62    No Known Problems Sister     No Known Problems Brother     No Known Problems Son     Diabetes Maternal Grandmother         type 2    No Known Problems Son     Uterine cancer Maternal Aunt      I have reviewed and agree with the history as documented  E-Cigarette/Vaping    E-Cigarette Use Never User      E-Cigarette/Vaping Substances    Nicotine No     THC No     CBD No     Flavoring No     Other No     Unknown No      Social History     Tobacco Use    Smoking status: Never Smoker    Smokeless tobacco: Never Used   Vaping Use    Vaping Use: Never used   Substance Use Topics    Alcohol use: Not Currently    Drug use: Not Currently        Review of Systems   Constitutional: Negative for chills and fever  HENT: Negative for sore throat  Respiratory: Negative for cough and shortness of breath  Cardiovascular: Negative for chest pain, palpitations and leg swelling  Gastrointestinal: Negative for abdominal pain, diarrhea, nausea and vomiting  Genitourinary: Positive for flank pain  Negative for dysuria and frequency  Musculoskeletal: Negative for myalgias  Skin: Negative for rash and wound  Neurological: Negative for dizziness, light-headedness and headaches  All other systems reviewed and are negative  Physical Exam  ED Triage Vitals [04/15/22 1132]   Temperature Pulse Respirations Blood Pressure SpO2   98 3 °F (36 8 °C) 71 16 111/53 97 %      Temp Source Heart Rate Source Patient Position - Orthostatic VS BP Location FiO2 (%)   Oral -- -- -- --      Pain Score       9             Orthostatic Vital Signs  Vitals:    04/15/22 1132   BP: 111/53   Pulse: 71       Physical Exam  Vitals and nursing note reviewed  Constitutional:       General: She is in acute distress (uncomfortable appearing)  Appearance: Normal appearance  She is not ill-appearing or toxic-appearing  HENT:      Head: Normocephalic and atraumatic        Right Ear: External ear normal       Left Ear: External ear normal       Nose: Nose normal  Mouth/Throat:      Mouth: Mucous membranes are moist       Pharynx: Oropharynx is clear  Eyes:      General: No scleral icterus  Extraocular Movements: Extraocular movements intact  Pupils: Pupils are equal, round, and reactive to light  Cardiovascular:      Rate and Rhythm: Normal rate  Pulses: Normal pulses  Heart sounds: Normal heart sounds  Pulmonary:      Effort: Pulmonary effort is normal  No respiratory distress  Breath sounds: Normal breath sounds  Abdominal:      General: Abdomen is flat  There is no distension  Palpations: Abdomen is soft  Tenderness: There is no abdominal tenderness  There is right CVA tenderness  There is no left CVA tenderness, guarding or rebound  Hernia: No hernia is present  Musculoskeletal:         General: Normal range of motion  Cervical back: Normal range of motion and neck supple  Skin:     General: Skin is warm  Capillary Refill: Capillary refill takes less than 2 seconds  Neurological:      General: No focal deficit present  Mental Status: She is alert and oriented to person, place, and time     Psychiatric:         Mood and Affect: Mood normal          ED Medications  Medications   sodium chloride 0 9 % bolus 1,000 mL (0 mL Intravenous Stopped 4/15/22 1355)   ketorolac (TORADOL) injection 15 mg (15 mg Intravenous Given 4/15/22 1224)   oxyCODONE (ROXICODONE) IR tablet 5 mg (5 mg Oral Given 4/15/22 1410)       Diagnostic Studies  Results Reviewed     Procedure Component Value Units Date/Time    Urine Microscopic [[id number]]  (Abnormal) Collected: 04/15/22 1205    Lab Status: Final result Specimen: Urine, Clean Catch Updated: 04/15/22 1346     RBC, UA 1-2 /hpf      WBC, UA 1-2 /hpf      Epithelial Cells Moderate /hpf      Bacteria, UA Occasional /hpf      MUCUS THREADS Occasional    Comprehensive metabolic panel [[id number]]  (Abnormal) Collected: 04/15/22 1222    Lab Status: Final result Specimen: Blood from Arm, Left Updated: 04/15/22 1310     Sodium 139 mmol/L      Potassium 4 1 mmol/L      Chloride 110 mmol/L      CO2 24 mmol/L      ANION GAP 5 mmol/L      BUN 11 mg/dL      Creatinine 0 79 mg/dL      Glucose 87 mg/dL      Calcium 9 3 mg/dL      AST 21 U/L      ALT 38 U/L      Alkaline Phosphatase 121 U/L      Total Protein 7 7 g/dL      Albumin 4 2 g/dL      Total Bilirubin 0 69 mg/dL      eGFR 102 ml/min/1 73sq m     Narrative:      National Kidney Disease Foundation guidelines for Chronic Kidney Disease (CKD):     Stage 1 with normal or high GFR (GFR > 90 mL/min/1 73 square meters)    Stage 2 Mild CKD (GFR = 60-89 mL/min/1 73 square meters)    Stage 3A Moderate CKD (GFR = 45-59 mL/min/1 73 square meters)    Stage 3B Moderate CKD (GFR = 30-44 mL/min/1 73 square meters)    Stage 4 Severe CKD (GFR = 15-29 mL/min/1 73 square meters)    Stage 5 End Stage CKD (GFR <15 mL/min/1 73 square meters)  Note: GFR calculation is accurate only with a steady state creatinine    Lipase [[id number]]  (Normal) Collected: 04/15/22 1222    Lab Status: Final result Specimen: Blood from Arm, Left Updated: 04/15/22 1310     Lipase 141 u/L     CBC and differential [[id number]]  (Abnormal) Collected: 04/15/22 1222    Lab Status: Final result Specimen: Blood from Arm, Left Updated: 04/15/22 1255     WBC 6 18 Thousand/uL      RBC 4 87 Million/uL      Hemoglobin 13 7 g/dL      Hematocrit 40 5 %      MCV 83 fL      MCH 28 1 pg      MCHC 33 8 g/dL      RDW 12 3 %      MPV 12 8 fL      Platelets 531 Thousands/uL      nRBC 0 /100 WBCs      Neutrophils Relative 65 %      Immat GRANS % 0 %      Lymphocytes Relative 26 %      Monocytes Relative 7 %      Eosinophils Relative 2 %      Basophils Relative 0 %      Neutrophils Absolute 3 99 Thousands/µL      Immature Grans Absolute 0 02 Thousand/uL      Lymphocytes Absolute 1 60 Thousands/µL      Monocytes Absolute 0 41 Thousand/µL      Eosinophils Absolute 0 14 Thousand/µL      Basophils Absolute 0 02 Thousands/µL     POCT pregnancy, urine [[id number]]  (Normal) Resulted: 04/15/22 1205    Lab Status: Final result Updated: 04/15/22 1234     EXT PREG TEST UR (Ref: Negative) negative     Control Valid    Urine Macroscopic, POC [[id number]]  (Abnormal) Collected: 04/15/22 1205    Lab Status: Final result Specimen: Urine Updated: 04/15/22 1207     Color, UA Yellow     Clarity, UA Slightly Cloudy     pH, UA 5 5     Leukocytes, UA Negative     Nitrite, UA Negative     Protein, UA Negative mg/dl      Glucose, UA Negative mg/dl      Ketones, UA Negative mg/dl      Urobilinogen, UA 0 2 E U /dl      Bilirubin, UA Negative     Blood, UA Moderate     Specific Gravity, UA >=1 030    Narrative:      [facility] RESULT                 CT renal stone study abdomen pelvis without contrast   Final Result by Jazzmine Coughlin MD (04/15 1433)   1  No acute intra-abdominal pathology  Workstation performed: [id number]               Procedures  Procedures      ED Course  ED Course as of 04/16/22 1131   Fri Apr 15, 2022   1213 Blood, UA(!): Moderate   1242 PREGNANCY TEST URINE: negative                                       MDM  Number of Diagnoses or Management Options  Right flank pain  Diagnosis management comments: 18-year-old female presenting for evaluation of right-sided flank pain  Physical exam is only significant for right-sided CVA tenderness  My differential diagnosis includes but is not limited to renal stone, pyelonephritis, MSK pain  Plan:  Symptomatic pain treatment, CBC, CMP, lipase, UA, urine preg, CT abdomen and pelvis renal stone study  Patient's labs are unremarkable, UA does not show signs of infection, CT abdomen pelvis renal stone study without renal stones or intra-abdominal pathology  Patient reports symptomatic improvement following pain medication administration    I discussed the results with the patient, believe that her pain is mostly musculoskeletal      I reviewed all testing with the patient: labs, UA, CT  I gave oral return precautions for what to return for in addition to the written return precautions  The patient (and any family present: n/a) verbalized understanding of the discharge instructions and warnings that would necessitate return to the Emergency Department  I specifically highlighted areas of special concern regarding the written and verbal discharge instructions and return precautions  All questions were answered prior to discharge  Disposition  Final diagnoses:   Right flank pain     Time reflects when diagnosis was documented in both MDM as applicable and the Disposition within this note     Time User Action Codes Description Comment    4/15/2022  3:03 PM Tressa Gutierres Add [R10 9] Right flank pain       ED Disposition     ED Disposition Condition Date/Time Comment    Discharge Stable Fri Apr 15, 2022  3:03 PM Fleet Coral discharge to home/self care  Follow-up Information    None         Discharge Medication List as of 4/15/2022  3:03 PM      CONTINUE these medications which have NOT CHANGED    Details   acetaminophen (TYLENOL) 325 mg tablet Take 2 tablets (650 mg total) by mouth every 6 (six) hours, Starting Sat 2021, Normal      ferrous sulfate 324 (65 Fe) mg Take 1 tablet (324 mg total) by mouth every other day, Starting Mon 3/15/2021, Normal      levonorgestrel (MIRENA) 20 MCG/24HR IUD 1 each by Intrauterine route once, Historical Med      methocarbamol (ROBAXIN) 500 mg tablet Take 1 tablet (500 mg total) by mouth 2 (two) times a day for 5 days, Starting Fri 12/3/2021, Until 2021, Normal      naproxen (Naprosyn) 500 mg tablet Take 1 tablet (500 mg total) by mouth 2 (two) times a day with meals for 5 days, Starting Fri 12/3/2021, Until 2021, Normal      Fhrvpc-CaYlr-PiBdp-FA-CA-Omega (Complete  DHA) 29-1-200 & 250 MG MISC TWICE A DAY, Normal           No discharge procedures on file      PDMP Review     None           ED Provider  Attending physically available and evaluated Kiana Elisefolk  BRANDIE managed the patient along with the ED Attending      Electronically Signed by         Yvette Landau, DO  04/16/22 8206

## 2022-04-18 NOTE — ED ATTENDING ATTESTATION
4/15/2022  IJesus DO, saw and evaluated the patient  I have discussed the patient with the resident/non-physician practitioner and agree with the resident's/non-physician practitioner's findings, Plan of Care, and MDM as documented in the resident's/non-physician practitioner's note, except where noted  All available labs and Radiology studies were reviewed  I was present for key portions of any procedure(s) performed by the resident/non-physician practitioner and I was immediately available to provide assistance  At this point I agree with the current assessment done in the Emergency Department  I have conducted an independent evaluation of this patient a history and physical is as follows:    23-year-old female presents for right-sided flank pain  Radiates into her groin  No fevers no chills cannot get comfortable    Plan is UA rule out infection CT to rule out kidney stone pain control reassess for disposition pregnancy test as well  ED Course         Critical Care Time  Procedures

## 2022-04-22 ENCOUNTER — OFFICE VISIT (OUTPATIENT)
Dept: DENTISTRY | Facility: CLINIC | Age: 27
End: 2022-04-22

## 2022-04-22 VITALS — TEMPERATURE: 97.4 F

## 2022-04-22 DIAGNOSIS — Z01.21 ENCOUNTER FOR DENTAL EXAMINATION AND CLEANING WITH ABNORMAL FINDINGS: Primary | ICD-10-CM

## 2022-04-22 PROCEDURE — D2392 RESIN-BASED COMPOSITE - 2 SURFACES, POSTERIOR: HCPCS | Performed by: DENTIST

## 2022-04-25 NOTE — PROGRESS NOTES
Composite Filling    Marshal Mcneal presents for composite filling on #13 MO and 15 OL  PMH reviewed, no changes  Applied topical benzocaine, administered 2 carps 2% lido 1:100k epi via local infiltration  Prepped teeth #13 MO and 15 OL with 245 carbide on high speed  Caries removed with round carbide on slow speed  Placed tofflemire matrix on tooth #13  Isolation with cotton rolls and dri-angles    Etch with 37% H2PO4, rinse, dry  Applied Adhese with 20 second scrub once, gentle air dry and light cured for 10s  Restored with Tetric bulk nik shade A2 and light cured  Restored teeth 13 and 15  Refined with finishing burs, polished with enhance point  Verified occlusion and contacts  Pt left satisfied      Nv: resins

## 2022-04-27 ENCOUNTER — OFFICE VISIT (OUTPATIENT)
Dept: DENTISTRY | Facility: CLINIC | Age: 27
End: 2022-04-27

## 2022-04-27 VITALS — TEMPERATURE: 98 F | SYSTOLIC BLOOD PRESSURE: 100 MMHG | HEART RATE: 76 BPM | DIASTOLIC BLOOD PRESSURE: 69 MMHG

## 2022-04-27 DIAGNOSIS — K02.9 CARIES: Primary | ICD-10-CM

## 2022-04-27 PROCEDURE — D2392 RESIN-BASED COMPOSITE - 2 SURFACES, POSTERIOR: HCPCS | Performed by: DENTIST

## 2022-04-27 NOTE — PROGRESS NOTES
Composite Filling    Niki Pinzon presents for composite filling #20 DO  PMH reviewed, no changes  Applied topical benzocaine, administered 1 carps 4% articaine 1:100k epi via local infiltration  Prepped tooth #20 DO with 245 carbide on high speed  Caries removed with round carbide on slow speed  Placed tofflemire matrix  Isolation with cotton rolls and dri-angles  Etch with 37% H2PO4, rinse, dry  Applied Adhese with 20 second scrub once, gentle air dry and light cured for 10s  Restored with Tetric bulk nik shade A2 and light cured  Refined with finishing burs, polished with enhance point  Verified occlusion and contacts  Pt left satisfied      NV: continue restorative treatment

## 2022-05-05 ENCOUNTER — OFFICE VISIT (OUTPATIENT)
Dept: DENTISTRY | Facility: CLINIC | Age: 27
End: 2022-05-05

## 2022-05-05 VITALS — HEART RATE: 76 BPM | SYSTOLIC BLOOD PRESSURE: 108 MMHG | TEMPERATURE: 98.1 F | DIASTOLIC BLOOD PRESSURE: 63 MMHG

## 2022-05-05 DIAGNOSIS — K02.9 CARIES: Primary | ICD-10-CM

## 2022-05-05 PROCEDURE — D2331 RESIN-BASED COMPOSITE - 2 SURFACES, ANTERIOR: HCPCS | Performed by: DENTIST

## 2022-05-05 NOTE — PROGRESS NOTES
Nasim Goel presents for composite filling #9-ML  PMH reviewed, no changes noted  Discussed with patient need for RCT if pulp exposure occurs or in future if pulp is inflamed  Pt understands and consents  Applied topical benzocaine, administered 1 carpule of 2% lidocaine with 1:100,000 epi via buccal infiltration and 0 25 carpule of 4% septocaine with 1:100,000 epi via palatal infiltration  Anesthesia verified  Prepped tooth #9 with 330 carbide on high speed  Caries removed with round carbide on slow speed  solation with cotton rolls  Applied LimeLight to pulpal floor of prep, etched with 37% H2PO4, scrubbed with Adhese, and restored with Tetric bulk nik shade A3 5  Finished and polished restoration  Verified occlusion  The patient felt that her restoration on #20-DO (completed 4/27/22) felt high - checked occlusion - the patient is not in occlusion with this restoration but the restoration is higher on the distal than the natural marginal ridge on the mesial  Adjusted the restoration accordingly  The patient was satisfied with our adjustments      NV: #18-D, B resin composite

## 2022-06-21 ENCOUNTER — OFFICE VISIT (OUTPATIENT)
Dept: DENTISTRY | Facility: CLINIC | Age: 27
End: 2022-06-21

## 2022-06-21 DIAGNOSIS — K02.62 DENTAL CARIES EXTENDING INTO DENTIN: Primary | ICD-10-CM

## 2022-06-21 PROCEDURE — D2332 RESIN-BASED COMPOSITE - 3 SURFACES, ANTERIOR: HCPCS | Performed by: DENTIST

## 2022-06-21 NOTE — PROGRESS NOTES
Composite Filling    Mick Robert presents for composite filling  PMH reviewed, no changes  #10-MLF    Applied topical benzocaine, administered 1 carps 2% lido 1:100k epi via infiltrations  Prepped tooth #10 with 245 carbide on high speed  Caries removed with round carbide on slow speed  Placed mylar  Isolation with cotton rolls  Pt was nervous and in the middle of the procedure needed a quick break, felt like she was shaking but mentioned she was nervous and other than that she felt fine  Re-assured pt that everything was going well and we were almost done  Pt relaxed and tolerated procedure very well  Etch with 37% H2PO4, rinse, dry  Applied Adhese with 20 second scrub once, gentle air dry and light cured for 10s  Restored with Tetric bulk nik shade A2 and light cured  Refined with finishing burs, polished with enhance point  Verified occlusion and contacts  Pt left satisfied and in good health  NV: heron Lee

## 2022-06-28 ENCOUNTER — OFFICE VISIT (OUTPATIENT)
Dept: DENTISTRY | Facility: CLINIC | Age: 27
End: 2022-06-28

## 2022-06-28 DIAGNOSIS — K02.62 DENTAL CARIES EXTENDING INTO DENTIN: Primary | ICD-10-CM

## 2022-06-28 PROCEDURE — D2393 RESIN-BASED COMPOSITE - 3 SURFACES, POSTERIOR: HCPCS | Performed by: DENTIST

## 2022-06-28 NOTE — PROGRESS NOTES
Composite Filling    Webb All presents for composite filling  PMH reviewed, no changes  #18-MOB    Applied topical benzocaine, administered 1 carps 2% lido 1:100k epi via CRYSTAL block left  Prepped tooth #18 with 245 carbide on high speed  Caries removed with round carbide on slow speed  Isolation with cotton rolls  Etch with 37% H2PO4, rinse, dry  Applied Adhese with 20 second scrub once, gentle air dry and light cured for 10s  Restored with Tetric bulk nik shade A2 and light cured  Refined with finishing burs, polished with enhance point  Verified occlusion and contacts  Pt left satisfied and in good health  NV: Hudson Chance to start impressions for Interim denture    J  Stiven Rhome

## 2023-07-13 NOTE — PROGRESS NOTES
LAB     Routine OB prenatal visit    31 yo [id number] presents for routine prenatal visit  Patient has no concerns a this appointment  Denies headaches, change in vision, no uterine contractions, VB, LF  FM present  GBS negative     Vitals:    05/20/21 0911   BP: 107/56   Pulse: 90     FHR: 130  FH 37 cm    A/P    IUP at 37 weeks  -Patient here for prenatal visit   No concerns   -GBS negative  -Contraception: Mirena IUD postpartum  -Scheduled RLTCS on 6

## (undated) DEVICE — ABDOMINAL PAD: Brand: DERMACEA

## (undated) DEVICE — CHLORAPREP HI-LITE 26ML ORANGE

## (undated) DEVICE — TELFA NON-ADHERENT ABSORBENT DRESSING: Brand: TELFA

## (undated) DEVICE — GAUZE SPONGES,16 PLY: Brand: CURITY

## (undated) DEVICE — SUT VICRYL 0 CT-1 27 IN J260H

## (undated) DEVICE — SUT MONOCRYL 0 CTX 36 IN Y398H

## (undated) DEVICE — SKIN MARKER DUAL TIP WITH RULER CAP, FLEXIBLE RULER AND LABELS: Brand: DEVON

## (undated) DEVICE — MEDI-VAC YANKAUER SUCTION HANDLE W/STRAIGHT TIP & CONTROL VENT: Brand: CARDINAL HEALTH

## (undated) DEVICE — PACK C-SECTION PBDS

## (undated) DEVICE — 3M™ STERI-STRIP™ REINFORCED ADHESIVE SKIN CLOSURES, R1547, 1/2 IN X 4 IN (12 MM X 100 MM), 6 STRIPS/ENVELOPE: Brand: 3M™ STERI-STRIP™

## (undated) DEVICE — SUT PLAIN 2-0 CTX 27 IN 872H

## (undated) DEVICE — SUT VICRYL 4-0 KS 27 IN J662H

## (undated) DEVICE — Device

## (undated) DEVICE — GLOVE SRG BIOGEL ECLIPSE 8